# Patient Record
Sex: MALE | Race: WHITE | NOT HISPANIC OR LATINO | Employment: OTHER | URBAN - METROPOLITAN AREA
[De-identification: names, ages, dates, MRNs, and addresses within clinical notes are randomized per-mention and may not be internally consistent; named-entity substitution may affect disease eponyms.]

---

## 2017-06-28 ENCOUNTER — APPOINTMENT (OUTPATIENT)
Dept: CT IMAGING | Facility: HOSPITAL | Age: 67
End: 2017-06-28

## 2017-06-28 ENCOUNTER — HOSPITAL ENCOUNTER (OUTPATIENT)
Facility: HOSPITAL | Age: 67
Setting detail: OBSERVATION
Discharge: HOME | End: 2017-06-30
Attending: EMERGENCY MEDICINE | Admitting: INTERNAL MEDICINE

## 2017-06-28 ENCOUNTER — APPOINTMENT (OUTPATIENT)
Dept: GENERAL RADIOLOGY | Facility: HOSPITAL | Age: 67
End: 2017-06-28

## 2017-06-28 ENCOUNTER — APPOINTMENT (OUTPATIENT)
Dept: CARDIOLOGY | Facility: HOSPITAL | Age: 67
End: 2017-06-28
Attending: INTERNAL MEDICINE

## 2017-06-28 ENCOUNTER — APPOINTMENT (OUTPATIENT)
Dept: ULTRASOUND IMAGING | Facility: HOSPITAL | Age: 67
End: 2017-06-28
Attending: EMERGENCY MEDICINE

## 2017-06-28 DIAGNOSIS — R10.13 EPIGASTRIC PAIN: ICD-10-CM

## 2017-06-28 DIAGNOSIS — R07.9 CHEST PAIN, UNSPECIFIED TYPE: Primary | ICD-10-CM

## 2017-06-28 DIAGNOSIS — I10 ESSENTIAL HYPERTENSION: ICD-10-CM

## 2017-06-28 DIAGNOSIS — K80.20 GALLSTONES: ICD-10-CM

## 2017-06-28 PROBLEM — R40.0 UNCONTROLLED DAYTIME SOMNOLENCE: Status: ACTIVE | Noted: 2017-06-28

## 2017-06-28 PROBLEM — R06.83 SNORING: Status: ACTIVE | Noted: 2017-06-28

## 2017-06-28 PROBLEM — R10.84 GENERALIZED ABDOMINAL PAIN: Status: ACTIVE | Noted: 2017-06-28

## 2017-06-28 PROBLEM — R07.2 PRECORDIAL PAIN: Status: ACTIVE | Noted: 2017-06-28

## 2017-06-28 PROBLEM — I20.9 ANGINA PECTORIS (HCC): Status: ACTIVE | Noted: 2017-06-28

## 2017-06-28 PROBLEM — Z72.0 TOBACCO ABUSE: Status: ACTIVE | Noted: 2017-06-28

## 2017-06-28 PROBLEM — N40.0 BENIGN PROSTATIC HYPERPLASIA WITHOUT LOWER URINARY TRACT SYMPTOMS: Status: ACTIVE | Noted: 2017-06-28

## 2017-06-28 PROBLEM — R53.83 FATIGUE: Status: ACTIVE | Noted: 2017-06-28

## 2017-06-28 PROBLEM — R73.9 HYPERGLYCEMIA: Status: ACTIVE | Noted: 2017-06-28

## 2017-06-28 LAB
ALBUMIN SERPL-MCNC: 4 G/DL (ref 3.5–5.2)
ALBUMIN/GLOB SERPL: 1.3 G/DL
ALP SERPL-CCNC: 83 U/L (ref 39–117)
ALT SERPL W P-5'-P-CCNC: 21 U/L (ref 1–41)
ANION GAP SERPL CALCULATED.3IONS-SCNC: 17 MMOL/L
AST SERPL-CCNC: 18 U/L (ref 1–40)
BASOPHILS # BLD AUTO: 0.04 10*3/MM3 (ref 0–0.2)
BASOPHILS NFR BLD AUTO: 0.6 % (ref 0–1.5)
BH CV ECHO MEAS - ACS: 1.9 CM
BH CV ECHO MEAS - AO MAX PG (FULL): 1 MMHG
BH CV ECHO MEAS - AO MAX PG: 5.8 MMHG
BH CV ECHO MEAS - AO MEAN PG (FULL): 1 MMHG
BH CV ECHO MEAS - AO MEAN PG: 3 MMHG
BH CV ECHO MEAS - AO ROOT AREA (BSA CORRECTED): 1.8
BH CV ECHO MEAS - AO ROOT AREA: 10.8 CM^2
BH CV ECHO MEAS - AO ROOT DIAM: 3.7 CM
BH CV ECHO MEAS - AO V2 MAX: 120 CM/SEC
BH CV ECHO MEAS - AO V2 MEAN: 74.1 CM/SEC
BH CV ECHO MEAS - AO V2 VTI: 22.5 CM
BH CV ECHO MEAS - ASC AORTA: 3.8 CM
BH CV ECHO MEAS - AVA(I,A): 3.1 CM^2
BH CV ECHO MEAS - AVA(I,D): 3.1 CM^2
BH CV ECHO MEAS - AVA(V,A): 2.9 CM^2
BH CV ECHO MEAS - AVA(V,D): 2.9 CM^2
BH CV ECHO MEAS - BSA(HAYCOCK): 2.1 M^2
BH CV ECHO MEAS - BSA: 2.1 M^2
BH CV ECHO MEAS - BZI_BMI: 26.5 KILOGRAMS/M^2
BH CV ECHO MEAS - BZI_METRIC_HEIGHT: 180.3 CM
BH CV ECHO MEAS - BZI_METRIC_WEIGHT: 86.2 KG
BH CV ECHO MEAS - CONTRAST EF (2CH): 51.8 ML/M^2
BH CV ECHO MEAS - CONTRAST EF 4CH: 50.9 ML/M^2
BH CV ECHO MEAS - EDV(CUBED): 110.6 ML
BH CV ECHO MEAS - EDV(MOD-SP2): 83 ML
BH CV ECHO MEAS - EDV(MOD-SP4): 108 ML
BH CV ECHO MEAS - EDV(TEICH): 107.5 ML
BH CV ECHO MEAS - EF(CUBED): 75.6 %
BH CV ECHO MEAS - EF(MOD-SP2): 51.8 %
BH CV ECHO MEAS - EF(MOD-SP4): 50.9 %
BH CV ECHO MEAS - EF(TEICH): 67.4 %
BH CV ECHO MEAS - ESV(CUBED): 27 ML
BH CV ECHO MEAS - ESV(MOD-SP2): 40 ML
BH CV ECHO MEAS - ESV(MOD-SP4): 53 ML
BH CV ECHO MEAS - ESV(TEICH): 35 ML
BH CV ECHO MEAS - FS: 37.5 %
BH CV ECHO MEAS - IVS/LVPW: 1
BH CV ECHO MEAS - IVSD: 0.8 CM
BH CV ECHO MEAS - LAT PEAK E' VEL: 6 CM/SEC
BH CV ECHO MEAS - LV DIASTOLIC VOL/BSA (35-75): 52.3 ML/M^2
BH CV ECHO MEAS - LV MASS(C)D: 126.7 GRAMS
BH CV ECHO MEAS - LV MASS(C)DI: 61.4 GRAMS/M^2
BH CV ECHO MEAS - LV MAX PG: 4.8 MMHG
BH CV ECHO MEAS - LV MEAN PG: 2 MMHG
BH CV ECHO MEAS - LV SYSTOLIC VOL/BSA (12-30): 25.7 ML/M^2
BH CV ECHO MEAS - LV V1 MAX: 109 CM/SEC
BH CV ECHO MEAS - LV V1 MEAN: 65 CM/SEC
BH CV ECHO MEAS - LV V1 VTI: 22.1 CM
BH CV ECHO MEAS - LVIDD: 4.8 CM
BH CV ECHO MEAS - LVIDS: 3 CM
BH CV ECHO MEAS - LVLD AP2: 7.8 CM
BH CV ECHO MEAS - LVLD AP4: 8.2 CM
BH CV ECHO MEAS - LVLS AP2: 6.8 CM
BH CV ECHO MEAS - LVLS AP4: 7.2 CM
BH CV ECHO MEAS - LVOT AREA (M): 3.1 CM^2
BH CV ECHO MEAS - LVOT AREA: 3.1 CM^2
BH CV ECHO MEAS - LVOT DIAM: 2 CM
BH CV ECHO MEAS - LVPWD: 0.8 CM
BH CV ECHO MEAS - MED PEAK E' VEL: 6 CM/SEC
BH CV ECHO MEAS - MV A DUR: 0.22 SEC
BH CV ECHO MEAS - MV A MAX VEL: 71.6 CM/SEC
BH CV ECHO MEAS - MV DEC SLOPE: 332 CM/SEC^2
BH CV ECHO MEAS - MV DEC TIME: 0.22 SEC
BH CV ECHO MEAS - MV E MAX VEL: 58.5 CM/SEC
BH CV ECHO MEAS - MV E/A: 0.82
BH CV ECHO MEAS - MV MAX PG: 3.1 MMHG
BH CV ECHO MEAS - MV MEAN PG: 1 MMHG
BH CV ECHO MEAS - MV P1/2T MAX VEL: 73.4 CM/SEC
BH CV ECHO MEAS - MV P1/2T: 64.8 MSEC
BH CV ECHO MEAS - MV V2 MAX: 87.9 CM/SEC
BH CV ECHO MEAS - MV V2 MEAN: 43.8 CM/SEC
BH CV ECHO MEAS - MV V2 VTI: 21.7 CM
BH CV ECHO MEAS - MVA P1/2T LCG: 3 CM^2
BH CV ECHO MEAS - MVA(P1/2T): 3.4 CM^2
BH CV ECHO MEAS - MVA(VTI): 3.2 CM^2
BH CV ECHO MEAS - PA ACC TIME: 0.14 SEC
BH CV ECHO MEAS - PA MAX PG (FULL): 2.1 MMHG
BH CV ECHO MEAS - PA MAX PG: 3.3 MMHG
BH CV ECHO MEAS - PA PR(ACCEL): 15.6 MMHG
BH CV ECHO MEAS - PA V2 MAX: 91.5 CM/SEC
BH CV ECHO MEAS - PULM A REVS DUR: 0.15 SEC
BH CV ECHO MEAS - PULM A REVS VEL: 31.2 CM/SEC
BH CV ECHO MEAS - PULM DIAS VEL: 47.1 CM/SEC
BH CV ECHO MEAS - PULM S/D: 1.3
BH CV ECHO MEAS - PULM SYS VEL: 61.2 CM/SEC
BH CV ECHO MEAS - PVA(V,A): 2.3 CM^2
BH CV ECHO MEAS - PVA(V,D): 2.3 CM^2
BH CV ECHO MEAS - QP/QS: 0.57
BH CV ECHO MEAS - RAP SYSTOLE: 3 MMHG
BH CV ECHO MEAS - RV MAX PG: 1.3 MMHG
BH CV ECHO MEAS - RV MEAN PG: 1 MMHG
BH CV ECHO MEAS - RV V1 MAX: 56.2 CM/SEC
BH CV ECHO MEAS - RV V1 MEAN: 34.1 CM/SEC
BH CV ECHO MEAS - RV V1 VTI: 10.4 CM
BH CV ECHO MEAS - RVOT AREA: 3.8 CM^2
BH CV ECHO MEAS - RVOT DIAM: 2.2 CM
BH CV ECHO MEAS - RVSP: 21 MMHG
BH CV ECHO MEAS - SI(AO): 117.2 ML/M^2
BH CV ECHO MEAS - SI(CUBED): 40.5 ML/M^2
BH CV ECHO MEAS - SI(LVOT): 33.6 ML/M^2
BH CV ECHO MEAS - SI(MOD-SP2): 20.8 ML/M^2
BH CV ECHO MEAS - SI(MOD-SP4): 26.7 ML/M^2
BH CV ECHO MEAS - SI(TEICH): 35.1 ML/M^2
BH CV ECHO MEAS - SV(AO): 241.9 ML
BH CV ECHO MEAS - SV(CUBED): 83.6 ML
BH CV ECHO MEAS - SV(LVOT): 69.4 ML
BH CV ECHO MEAS - SV(MOD-SP2): 43 ML
BH CV ECHO MEAS - SV(MOD-SP4): 55 ML
BH CV ECHO MEAS - SV(RVOT): 39.5 ML
BH CV ECHO MEAS - SV(TEICH): 72.5 ML
BH CV ECHO MEAS - TAPSE (>1.6): 2.6 CM2
BH CV ECHO MEAS - TR MAX VEL: 212 CM/SEC
BH CV VAS BP RIGHT ARM: NORMAL MMHG
BH CV XLRA - RV BASE: 4.1 CM
BH CV XLRA - RV LENGTH: 7.5 CM
BH CV XLRA - RV MID: 4.1 CM
BH CV XLRA - TDI S': 19 CM/SEC
BILIRUB SERPL-MCNC: 0.4 MG/DL (ref 0.1–1.2)
BILIRUB UR QL STRIP: NEGATIVE
BUN BLD-MCNC: 18 MG/DL (ref 8–23)
BUN/CREAT SERPL: 17.8 (ref 7–25)
CALCIUM SPEC-SCNC: 10.1 MG/DL (ref 8.6–10.5)
CHLORIDE SERPL-SCNC: 99 MMOL/L (ref 98–107)
CLARITY UR: ABNORMAL
CO2 SERPL-SCNC: 21 MMOL/L (ref 22–29)
COLOR UR: YELLOW
CREAT BLD-MCNC: 1.01 MG/DL (ref 0.76–1.27)
D DIMER PPP FEU-MCNC: 0.34 MCGFEU/ML (ref 0–0.49)
DEPRECATED RDW RBC AUTO: 41.2 FL (ref 37–54)
E/E' RATIO: 10
EOSINOPHIL # BLD AUTO: 0.25 10*3/MM3 (ref 0–0.7)
EOSINOPHIL NFR BLD AUTO: 4.1 % (ref 0.3–6.2)
ERYTHROCYTE [DISTWIDTH] IN BLOOD BY AUTOMATED COUNT: 13.1 % (ref 11.5–14.5)
GFR SERPL CREATININE-BSD FRML MDRD: 73 ML/MIN/1.73
GLOBULIN UR ELPH-MCNC: 3 GM/DL
GLUCOSE BLD-MCNC: 117 MG/DL (ref 65–99)
GLUCOSE UR STRIP-MCNC: NEGATIVE MG/DL
HBA1C MFR BLD: 5.6 % (ref 4.8–5.6)
HCT VFR BLD AUTO: 40.1 % (ref 40.4–52.2)
HGB BLD-MCNC: 13.5 G/DL (ref 13.7–17.6)
HGB UR QL STRIP.AUTO: NEGATIVE
HOLD SPECIMEN: NORMAL
HOLD SPECIMEN: NORMAL
IMM GRANULOCYTES # BLD: 0 10*3/MM3 (ref 0–0.03)
IMM GRANULOCYTES NFR BLD: 0 % (ref 0–0.5)
INR PPP: 1.01 (ref 0.9–1.1)
KETONES UR QL STRIP: NEGATIVE
LEFT ATRIUM VOLUME INDEX: 18 ML/M2
LEFT ATRIUM VOLUME: 37 CM3
LEUKOCYTE ESTERASE UR QL STRIP.AUTO: NEGATIVE
LIPASE SERPL-CCNC: 37 U/L (ref 13–60)
LV EF 2D ECHO EST: 51 %
LYMPHOCYTES # BLD AUTO: 1.51 10*3/MM3 (ref 0.9–4.8)
LYMPHOCYTES NFR BLD AUTO: 24.5 % (ref 19.6–45.3)
MCH RBC QN AUTO: 28.7 PG (ref 27–32.7)
MCHC RBC AUTO-ENTMCNC: 33.7 G/DL (ref 32.6–36.4)
MCV RBC AUTO: 85.3 FL (ref 79.8–96.2)
MONOCYTES # BLD AUTO: 0.53 10*3/MM3 (ref 0.2–1.2)
MONOCYTES NFR BLD AUTO: 8.6 % (ref 5–12)
NEUTROPHILS # BLD AUTO: 3.83 10*3/MM3 (ref 1.9–8.1)
NEUTROPHILS NFR BLD AUTO: 62.2 % (ref 42.7–76)
NITRITE UR QL STRIP: NEGATIVE
NT-PROBNP SERPL-MCNC: 20.4 PG/ML (ref 0–900)
PH UR STRIP.AUTO: 8 [PH] (ref 5–8)
PLATELET # BLD AUTO: 201 10*3/MM3 (ref 140–500)
PMV BLD AUTO: 10.5 FL (ref 6–12)
POTASSIUM BLD-SCNC: 4.5 MMOL/L (ref 3.5–5.2)
PROT SERPL-MCNC: 7 G/DL (ref 6–8.5)
PROT UR QL STRIP: NEGATIVE
PROTHROMBIN TIME: 12.9 SECONDS (ref 11.7–14.2)
RBC # BLD AUTO: 4.7 10*6/MM3 (ref 4.6–6)
SODIUM BLD-SCNC: 137 MMOL/L (ref 136–145)
SP GR UR STRIP: 1.02 (ref 1–1.03)
TROPONIN T SERPL-MCNC: <0.01 NG/ML (ref 0–0.03)
UROBILINOGEN UR QL STRIP: ABNORMAL
WBC NRBC COR # BLD: 6.16 10*3/MM3 (ref 4.5–10.7)
WHOLE BLOOD HOLD SPECIMEN: NORMAL
WHOLE BLOOD HOLD SPECIMEN: NORMAL

## 2017-06-28 PROCEDURE — G0378 HOSPITAL OBSERVATION PER HR: HCPCS

## 2017-06-28 PROCEDURE — 99204 OFFICE O/P NEW MOD 45 MIN: CPT | Performed by: INTERNAL MEDICINE

## 2017-06-28 PROCEDURE — 81003 URINALYSIS AUTO W/O SCOPE: CPT | Performed by: EMERGENCY MEDICINE

## 2017-06-28 PROCEDURE — 96375 TX/PRO/DX INJ NEW DRUG ADDON: CPT

## 2017-06-28 PROCEDURE — 25010000002 ENOXAPARIN PER 10 MG: Performed by: INTERNAL MEDICINE

## 2017-06-28 PROCEDURE — 0 IOPAMIDOL 61 % SOLUTION: Performed by: EMERGENCY MEDICINE

## 2017-06-28 PROCEDURE — 71020 HC CHEST PA AND LATERAL: CPT

## 2017-06-28 PROCEDURE — 25010000002 ONDANSETRON PER 1 MG: Performed by: INTERNAL MEDICINE

## 2017-06-28 PROCEDURE — 0 IOPAMIDOL PER 1 ML: Performed by: INTERNAL MEDICINE

## 2017-06-28 PROCEDURE — 93005 ELECTROCARDIOGRAM TRACING: CPT | Performed by: EMERGENCY MEDICINE

## 2017-06-28 PROCEDURE — 25810000003 SODIUM CHLORIDE 0.9 % WITH KCL 20 MEQ 20-0.9 MEQ/L-% SOLUTION: Performed by: INTERNAL MEDICINE

## 2017-06-28 PROCEDURE — 93306 TTE W/DOPPLER COMPLETE: CPT | Performed by: INTERNAL MEDICINE

## 2017-06-28 PROCEDURE — 85379 FIBRIN DEGRADATION QUANT: CPT | Performed by: INTERNAL MEDICINE

## 2017-06-28 PROCEDURE — 96374 THER/PROPH/DIAG INJ IV PUSH: CPT

## 2017-06-28 PROCEDURE — 25010000002 MORPHINE SULFATE (PF) 2 MG/ML SOLUTION

## 2017-06-28 PROCEDURE — 71275 CT ANGIOGRAPHY CHEST: CPT

## 2017-06-28 PROCEDURE — 85025 COMPLETE CBC W/AUTO DIFF WBC: CPT | Performed by: EMERGENCY MEDICINE

## 2017-06-28 PROCEDURE — 96372 THER/PROPH/DIAG INJ SC/IM: CPT

## 2017-06-28 PROCEDURE — 99284 EMERGENCY DEPT VISIT MOD MDM: CPT

## 2017-06-28 PROCEDURE — 0399T ADULT TRANSTHORACIC ECHO COMPLETE: CPT | Performed by: INTERNAL MEDICINE

## 2017-06-28 PROCEDURE — 25010000002 MORPHINE PER 10 MG: Performed by: EMERGENCY MEDICINE

## 2017-06-28 PROCEDURE — 93306 TTE W/DOPPLER COMPLETE: CPT

## 2017-06-28 PROCEDURE — 93010 ELECTROCARDIOGRAM REPORT: CPT | Performed by: INTERNAL MEDICINE

## 2017-06-28 PROCEDURE — 74177 CT ABD & PELVIS W/CONTRAST: CPT

## 2017-06-28 PROCEDURE — 0399T HC MYOCARDL STRAIN IMAG QUAN ASSMT PER SESS: CPT

## 2017-06-28 PROCEDURE — 84484 ASSAY OF TROPONIN QUANT: CPT | Performed by: INTERNAL MEDICINE

## 2017-06-28 PROCEDURE — 93005 ELECTROCARDIOGRAM TRACING: CPT

## 2017-06-28 PROCEDURE — 80053 COMPREHEN METABOLIC PANEL: CPT | Performed by: EMERGENCY MEDICINE

## 2017-06-28 PROCEDURE — 83880 ASSAY OF NATRIURETIC PEPTIDE: CPT | Performed by: EMERGENCY MEDICINE

## 2017-06-28 PROCEDURE — 85610 PROTHROMBIN TIME: CPT | Performed by: EMERGENCY MEDICINE

## 2017-06-28 PROCEDURE — 84484 ASSAY OF TROPONIN QUANT: CPT | Performed by: EMERGENCY MEDICINE

## 2017-06-28 PROCEDURE — 96376 TX/PRO/DX INJ SAME DRUG ADON: CPT

## 2017-06-28 PROCEDURE — 76705 ECHO EXAM OF ABDOMEN: CPT

## 2017-06-28 PROCEDURE — 83036 HEMOGLOBIN GLYCOSYLATED A1C: CPT | Performed by: INTERNAL MEDICINE

## 2017-06-28 PROCEDURE — 96361 HYDRATE IV INFUSION ADD-ON: CPT

## 2017-06-28 PROCEDURE — 83690 ASSAY OF LIPASE: CPT | Performed by: EMERGENCY MEDICINE

## 2017-06-28 RX ORDER — NAPROXEN SODIUM 220 MG
220 TABLET ORAL EVERY 4 HOURS PRN
COMMUNITY
End: 2017-06-30 | Stop reason: HOSPADM

## 2017-06-28 RX ORDER — MORPHINE SULFATE 2 MG/ML
2 INJECTION, SOLUTION INTRAMUSCULAR; INTRAVENOUS EVERY 4 HOURS PRN
Status: DISCONTINUED | OUTPATIENT
Start: 2017-06-28 | End: 2017-06-30 | Stop reason: HOSPADM

## 2017-06-28 RX ORDER — ACETAMINOPHEN 325 MG/1
650 TABLET ORAL EVERY 4 HOURS PRN
Status: DISCONTINUED | OUTPATIENT
Start: 2017-06-28 | End: 2017-06-30 | Stop reason: HOSPADM

## 2017-06-28 RX ORDER — MORPHINE SULFATE 2 MG/ML
2 INJECTION, SOLUTION INTRAMUSCULAR; INTRAVENOUS ONCE
Status: COMPLETED | OUTPATIENT
Start: 2017-06-28 | End: 2017-06-28

## 2017-06-28 RX ORDER — SODIUM CHLORIDE AND POTASSIUM CHLORIDE 150; 900 MG/100ML; MG/100ML
100 INJECTION, SOLUTION INTRAVENOUS CONTINUOUS
Status: DISCONTINUED | OUTPATIENT
Start: 2017-06-28 | End: 2017-06-30 | Stop reason: HOSPADM

## 2017-06-28 RX ORDER — MORPHINE SULFATE 2 MG/ML
INJECTION, SOLUTION INTRAMUSCULAR; INTRAVENOUS
Status: COMPLETED
Start: 2017-06-28 | End: 2017-06-28

## 2017-06-28 RX ORDER — SODIUM CHLORIDE 0.9 % (FLUSH) 0.9 %
1-10 SYRINGE (ML) INJECTION AS NEEDED
Status: DISCONTINUED | OUTPATIENT
Start: 2017-06-28 | End: 2017-06-30 | Stop reason: HOSPADM

## 2017-06-28 RX ORDER — SODIUM CHLORIDE 0.9 % (FLUSH) 0.9 %
10 SYRINGE (ML) INJECTION AS NEEDED
Status: DISCONTINUED | OUTPATIENT
Start: 2017-06-28 | End: 2017-06-30 | Stop reason: HOSPADM

## 2017-06-28 RX ORDER — ONDANSETRON 2 MG/ML
4 INJECTION INTRAMUSCULAR; INTRAVENOUS EVERY 4 HOURS PRN
Status: DISCONTINUED | OUTPATIENT
Start: 2017-06-28 | End: 2017-06-30 | Stop reason: HOSPADM

## 2017-06-28 RX ORDER — ASPIRIN 81 MG/1
324 TABLET, CHEWABLE ORAL DAILY
Status: DISCONTINUED | OUTPATIENT
Start: 2017-06-29 | End: 2017-06-30 | Stop reason: HOSPADM

## 2017-06-28 RX ORDER — HYDROCODONE BITARTRATE AND ACETAMINOPHEN 5; 325 MG/1; MG/1
1 TABLET ORAL EVERY 4 HOURS PRN
Status: DISCONTINUED | OUTPATIENT
Start: 2017-06-28 | End: 2017-06-30 | Stop reason: HOSPADM

## 2017-06-28 RX ORDER — FAMOTIDINE 40 MG/1
40 TABLET, FILM COATED ORAL DAILY
Status: DISCONTINUED | OUTPATIENT
Start: 2017-06-28 | End: 2017-06-30 | Stop reason: HOSPADM

## 2017-06-28 RX ADMIN — MORPHINE SULFATE 2 MG: 2 INJECTION, SOLUTION INTRAMUSCULAR; INTRAVENOUS at 11:12

## 2017-06-28 RX ADMIN — MORPHINE SULFATE 2 MG: 2 INJECTION, SOLUTION INTRAMUSCULAR; INTRAVENOUS at 15:38

## 2017-06-28 RX ADMIN — POTASSIUM CHLORIDE AND SODIUM CHLORIDE 100 ML/HR: 900; 150 INJECTION, SOLUTION INTRAVENOUS at 18:18

## 2017-06-28 RX ADMIN — IOPAMIDOL 85 ML: 612 INJECTION, SOLUTION INTRAVENOUS at 10:38

## 2017-06-28 RX ADMIN — FAMOTIDINE 40 MG: 40 TABLET, FILM COATED ORAL at 18:22

## 2017-06-28 RX ADMIN — ONDANSETRON 4 MG: 2 INJECTION INTRAMUSCULAR; INTRAVENOUS at 15:30

## 2017-06-28 RX ADMIN — NITROGLYCERIN 1 INCH: 20 OINTMENT TOPICAL at 12:12

## 2017-06-28 RX ADMIN — ENOXAPARIN SODIUM 40 MG: 40 INJECTION SUBCUTANEOUS at 18:18

## 2017-06-28 RX ADMIN — ACETAMINOPHEN 650 MG: 325 TABLET ORAL at 18:22

## 2017-06-28 RX ADMIN — IOPAMIDOL 95 ML: 755 INJECTION, SOLUTION INTRAVENOUS at 17:28

## 2017-06-29 ENCOUNTER — APPOINTMENT (OUTPATIENT)
Dept: NUCLEAR MEDICINE | Facility: HOSPITAL | Age: 67
End: 2017-06-29

## 2017-06-29 LAB
ANION GAP SERPL CALCULATED.3IONS-SCNC: 14.9 MMOL/L
BUN BLD-MCNC: 14 MG/DL (ref 8–23)
BUN/CREAT SERPL: 15.4 (ref 7–25)
CALCIUM SPEC-SCNC: 8.7 MG/DL (ref 8.6–10.5)
CHLORIDE SERPL-SCNC: 103 MMOL/L (ref 98–107)
CO2 SERPL-SCNC: 21.1 MMOL/L (ref 22–29)
CREAT BLD-MCNC: 0.91 MG/DL (ref 0.76–1.27)
DEPRECATED RDW RBC AUTO: 43.3 FL (ref 37–54)
ERYTHROCYTE [DISTWIDTH] IN BLOOD BY AUTOMATED COUNT: 13.6 % (ref 11.5–14.5)
GFR SERPL CREATININE-BSD FRML MDRD: 83 ML/MIN/1.73
GLUCOSE BLD-MCNC: 101 MG/DL (ref 65–99)
HCT VFR BLD AUTO: 36.9 % (ref 40.4–52.2)
HGB BLD-MCNC: 12.2 G/DL (ref 13.7–17.6)
MCH RBC QN AUTO: 29 PG (ref 27–32.7)
MCHC RBC AUTO-ENTMCNC: 33.1 G/DL (ref 32.6–36.4)
MCV RBC AUTO: 87.9 FL (ref 79.8–96.2)
PLATELET # BLD AUTO: 182 10*3/MM3 (ref 140–500)
PMV BLD AUTO: 10.6 FL (ref 6–12)
POTASSIUM BLD-SCNC: 4.4 MMOL/L (ref 3.5–5.2)
RBC # BLD AUTO: 4.2 10*6/MM3 (ref 4.6–6)
SODIUM BLD-SCNC: 139 MMOL/L (ref 136–145)
TROPONIN T SERPL-MCNC: <0.01 NG/ML (ref 0–0.03)
WBC NRBC COR # BLD: 7.39 10*3/MM3 (ref 4.5–10.7)

## 2017-06-29 PROCEDURE — 96376 TX/PRO/DX INJ SAME DRUG ADON: CPT

## 2017-06-29 PROCEDURE — 80048 BASIC METABOLIC PNL TOTAL CA: CPT | Performed by: INTERNAL MEDICINE

## 2017-06-29 PROCEDURE — 99218 PR INITIAL OBSERVATION CARE/DAY 30 MINUTES: CPT | Performed by: SURGERY

## 2017-06-29 PROCEDURE — G0378 HOSPITAL OBSERVATION PER HR: HCPCS

## 2017-06-29 PROCEDURE — 93010 ELECTROCARDIOGRAM REPORT: CPT | Performed by: INTERNAL MEDICINE

## 2017-06-29 PROCEDURE — 25010000002 MORPHINE PER 10 MG: Performed by: RADIOLOGY

## 2017-06-29 PROCEDURE — 85027 COMPLETE CBC AUTOMATED: CPT | Performed by: INTERNAL MEDICINE

## 2017-06-29 PROCEDURE — 84484 ASSAY OF TROPONIN QUANT: CPT | Performed by: INTERNAL MEDICINE

## 2017-06-29 PROCEDURE — 0 TECHNETIUM TC 99M MEBROFENIN KIT: Performed by: HOSPITALIST

## 2017-06-29 PROCEDURE — 99213 OFFICE O/P EST LOW 20 MIN: CPT | Performed by: INTERNAL MEDICINE

## 2017-06-29 PROCEDURE — 93005 ELECTROCARDIOGRAM TRACING: CPT | Performed by: INTERNAL MEDICINE

## 2017-06-29 PROCEDURE — 25010000002 ENOXAPARIN PER 10 MG: Performed by: INTERNAL MEDICINE

## 2017-06-29 PROCEDURE — 25810000003 SODIUM CHLORIDE 0.9 % WITH KCL 20 MEQ 20-0.9 MEQ/L-% SOLUTION: Performed by: INTERNAL MEDICINE

## 2017-06-29 PROCEDURE — A9537 TC99M MEBROFENIN: HCPCS | Performed by: HOSPITALIST

## 2017-06-29 PROCEDURE — 78227 HEPATOBIL SYST IMAGE W/DRUG: CPT

## 2017-06-29 PROCEDURE — 96372 THER/PROPH/DIAG INJ SC/IM: CPT

## 2017-06-29 PROCEDURE — 96361 HYDRATE IV INFUSION ADD-ON: CPT

## 2017-06-29 RX ORDER — KIT FOR THE PREPARATION OF TECHNETIUM TC 99M MEBROFENIN 45 MG/10ML
1 INJECTION, POWDER, LYOPHILIZED, FOR SOLUTION INTRAVENOUS
Status: COMPLETED | OUTPATIENT
Start: 2017-06-29 | End: 2017-06-29

## 2017-06-29 RX ORDER — MORPHINE SULFATE 10 MG/ML
0.04 INJECTION, SOLUTION INTRAMUSCULAR; INTRAVENOUS
Status: COMPLETED | OUTPATIENT
Start: 2017-06-29 | End: 2017-06-29

## 2017-06-29 RX ADMIN — FAMOTIDINE 40 MG: 40 TABLET, FILM COATED ORAL at 14:57

## 2017-06-29 RX ADMIN — POTASSIUM CHLORIDE AND SODIUM CHLORIDE 100 ML/HR: 900; 150 INJECTION, SOLUTION INTRAVENOUS at 04:13

## 2017-06-29 RX ADMIN — MORPHINE SULFATE 3.4 MG: 10 INJECTION, SOLUTION INTRAMUSCULAR; INTRAVENOUS at 13:05

## 2017-06-29 RX ADMIN — ASPIRIN 324 MG: 81 TABLET, CHEWABLE ORAL at 14:59

## 2017-06-29 RX ADMIN — POTASSIUM CHLORIDE AND SODIUM CHLORIDE 100 ML/HR: 900; 150 INJECTION, SOLUTION INTRAVENOUS at 15:02

## 2017-06-29 RX ADMIN — ENOXAPARIN SODIUM 40 MG: 40 INJECTION SUBCUTANEOUS at 17:08

## 2017-06-29 RX ADMIN — MEBROFENIN 1 DOSE: 45 INJECTION, POWDER, LYOPHILIZED, FOR SOLUTION INTRAVENOUS at 12:15

## 2017-06-30 VITALS
SYSTOLIC BLOOD PRESSURE: 124 MMHG | WEIGHT: 190 LBS | RESPIRATION RATE: 16 BRPM | TEMPERATURE: 97.6 F | HEIGHT: 71 IN | OXYGEN SATURATION: 95 % | HEART RATE: 84 BPM | DIASTOLIC BLOOD PRESSURE: 92 MMHG | BODY MASS INDEX: 26.6 KG/M2

## 2017-06-30 PROCEDURE — 96361 HYDRATE IV INFUSION ADD-ON: CPT

## 2017-06-30 PROCEDURE — G0378 HOSPITAL OBSERVATION PER HR: HCPCS

## 2017-06-30 PROCEDURE — 25810000003 SODIUM CHLORIDE 0.9 % WITH KCL 20 MEQ 20-0.9 MEQ/L-% SOLUTION: Performed by: INTERNAL MEDICINE

## 2017-06-30 RX ORDER — ACETAMINOPHEN 325 MG/1
650 TABLET ORAL EVERY 4 HOURS PRN
Refills: 0
Start: 2017-06-30 | End: 2017-08-04 | Stop reason: HOSPADM

## 2017-06-30 RX ORDER — POLYETHYLENE GLYCOL 3350 17 G/17G
17 POWDER, FOR SOLUTION ORAL 2 TIMES DAILY
Status: DISCONTINUED | OUTPATIENT
Start: 2017-06-30 | End: 2017-06-30 | Stop reason: HOSPADM

## 2017-06-30 RX ORDER — POLYETHYLENE GLYCOL 3350 17 G/17G
17 POWDER, FOR SOLUTION ORAL 2 TIMES DAILY
Start: 2017-06-30 | End: 2017-07-31

## 2017-06-30 RX ADMIN — ASPIRIN 324 MG: 81 TABLET, CHEWABLE ORAL at 08:34

## 2017-06-30 RX ADMIN — POTASSIUM CHLORIDE AND SODIUM CHLORIDE 100 ML/HR: 900; 150 INJECTION, SOLUTION INTRAVENOUS at 00:52

## 2017-06-30 RX ADMIN — FAMOTIDINE 40 MG: 40 TABLET, FILM COATED ORAL at 08:34

## 2017-07-24 ENCOUNTER — TELEPHONE (OUTPATIENT)
Dept: SURGERY | Facility: CLINIC | Age: 67
End: 2017-07-24

## 2017-07-24 NOTE — TELEPHONE ENCOUNTER
Patient was seen by DR Patel in ER he called with severe abd pain. I informed him that DR Patel was out this Tues and Weds but could see him Friday . I also told him that if the pain became worse that he should go to the ER. He acknowledged

## 2017-07-28 ENCOUNTER — OFFICE VISIT (OUTPATIENT)
Dept: SURGERY | Facility: CLINIC | Age: 67
End: 2017-07-28

## 2017-07-28 VITALS — BODY MASS INDEX: 25.96 KG/M2 | WEIGHT: 185.4 LBS | OXYGEN SATURATION: 98 % | HEIGHT: 71 IN | HEART RATE: 75 BPM

## 2017-07-28 DIAGNOSIS — K80.20 CALCULUS OF GALLBLADDER WITHOUT CHOLECYSTITIS WITHOUT OBSTRUCTION: Primary | ICD-10-CM

## 2017-07-28 PROCEDURE — 99213 OFFICE O/P EST LOW 20 MIN: CPT | Performed by: SURGERY

## 2017-07-28 RX ORDER — CEFAZOLIN SODIUM 2 G/100ML
2 INJECTION, SOLUTION INTRAVENOUS ONCE
Status: CANCELLED | OUTPATIENT
Start: 2017-08-01 | End: 2017-07-28

## 2017-07-28 RX ORDER — NAPROXEN SODIUM 220 MG
220 TABLET ORAL 2 TIMES DAILY PRN
COMMUNITY
End: 2018-11-13

## 2017-08-01 ENCOUNTER — ANESTHESIA EVENT (OUTPATIENT)
Dept: PERIOP | Facility: HOSPITAL | Age: 67
End: 2017-08-01

## 2017-08-01 ENCOUNTER — HOSPITAL ENCOUNTER (OUTPATIENT)
Facility: HOSPITAL | Age: 67
Setting detail: HOSPITAL OUTPATIENT SURGERY
Discharge: HOME | End: 2017-08-01
Attending: SURGERY | Admitting: SURGERY

## 2017-08-01 ENCOUNTER — ANESTHESIA (OUTPATIENT)
Dept: PERIOP | Facility: HOSPITAL | Age: 67
End: 2017-08-01

## 2017-08-01 ENCOUNTER — APPOINTMENT (OUTPATIENT)
Dept: GENERAL RADIOLOGY | Facility: HOSPITAL | Age: 67
End: 2017-08-01

## 2017-08-01 VITALS
RESPIRATION RATE: 18 BRPM | OXYGEN SATURATION: 95 % | TEMPERATURE: 97.4 F | HEART RATE: 78 BPM | SYSTOLIC BLOOD PRESSURE: 146 MMHG | HEIGHT: 71 IN | DIASTOLIC BLOOD PRESSURE: 97 MMHG

## 2017-08-01 DIAGNOSIS — K80.20 CALCULUS OF GALLBLADDER WITHOUT CHOLECYSTITIS WITHOUT OBSTRUCTION: ICD-10-CM

## 2017-08-01 PROCEDURE — 25010000002 KETOROLAC TROMETHAMINE PER 15 MG: Performed by: NURSE ANESTHETIST, CERTIFIED REGISTERED

## 2017-08-01 PROCEDURE — 25010000002 FENTANYL CITRATE (PF) 100 MCG/2ML SOLUTION: Performed by: NURSE ANESTHETIST, CERTIFIED REGISTERED

## 2017-08-01 PROCEDURE — 47563 LAPARO CHOLECYSTECTOMY/GRAPH: CPT | Performed by: SURGERY

## 2017-08-01 PROCEDURE — 25010000002 PROPOFOL 10 MG/ML EMULSION: Performed by: NURSE ANESTHETIST, CERTIFIED REGISTERED

## 2017-08-01 PROCEDURE — 0 IOTHALAMATE 60 % SOLUTION: Performed by: SURGERY

## 2017-08-01 PROCEDURE — 25010000002 MIDAZOLAM PER 1 MG: Performed by: ANESTHESIOLOGY

## 2017-08-01 PROCEDURE — 47563 LAPARO CHOLECYSTECTOMY/GRAPH: CPT | Performed by: PHYSICIAN ASSISTANT

## 2017-08-01 PROCEDURE — 74300 X-RAY BILE DUCTS/PANCREAS: CPT

## 2017-08-01 PROCEDURE — 88304 TISSUE EXAM BY PATHOLOGIST: CPT | Performed by: SURGERY

## 2017-08-01 PROCEDURE — 25010000002 PHENYLEPHRINE PER 1 ML: Performed by: NURSE ANESTHETIST, CERTIFIED REGISTERED

## 2017-08-01 PROCEDURE — 25010000002 ONDANSETRON PER 1 MG: Performed by: NURSE ANESTHETIST, CERTIFIED REGISTERED

## 2017-08-01 PROCEDURE — 25010000003 CEFAZOLIN IN DEXTROSE 2-4 GM/100ML-% SOLUTION: Performed by: SURGERY

## 2017-08-01 PROCEDURE — 25010000002 DEXAMETHASONE PER 1 MG: Performed by: NURSE ANESTHETIST, CERTIFIED REGISTERED

## 2017-08-01 RX ORDER — KETOROLAC TROMETHAMINE 30 MG/ML
INJECTION, SOLUTION INTRAMUSCULAR; INTRAVENOUS AS NEEDED
Status: DISCONTINUED | OUTPATIENT
Start: 2017-08-01 | End: 2017-08-01 | Stop reason: SURG

## 2017-08-01 RX ORDER — CEFAZOLIN SODIUM 2 G/100ML
2 INJECTION, SOLUTION INTRAVENOUS ONCE
Status: COMPLETED | OUTPATIENT
Start: 2017-08-01 | End: 2017-08-01

## 2017-08-01 RX ORDER — SODIUM CHLORIDE, SODIUM LACTATE, POTASSIUM CHLORIDE, CALCIUM CHLORIDE 600; 310; 30; 20 MG/100ML; MG/100ML; MG/100ML; MG/100ML
9 INJECTION, SOLUTION INTRAVENOUS CONTINUOUS
Status: DISCONTINUED | OUTPATIENT
Start: 2017-08-01 | End: 2017-08-01 | Stop reason: HOSPADM

## 2017-08-01 RX ORDER — LIDOCAINE HYDROCHLORIDE 10 MG/ML
0.5 INJECTION, SOLUTION EPIDURAL; INFILTRATION; INTRACAUDAL; PERINEURAL ONCE AS NEEDED
Status: DISCONTINUED | OUTPATIENT
Start: 2017-08-01 | End: 2017-08-01 | Stop reason: HOSPADM

## 2017-08-01 RX ORDER — SODIUM CHLORIDE 9 MG/ML
INJECTION, SOLUTION INTRAVENOUS AS NEEDED
Status: DISCONTINUED | OUTPATIENT
Start: 2017-08-01 | End: 2017-08-01 | Stop reason: HOSPADM

## 2017-08-01 RX ORDER — LABETALOL HYDROCHLORIDE 5 MG/ML
5 INJECTION, SOLUTION INTRAVENOUS
Status: DISCONTINUED | OUTPATIENT
Start: 2017-08-01 | End: 2017-08-01 | Stop reason: HOSPADM

## 2017-08-01 RX ORDER — FENTANYL CITRATE 50 UG/ML
50 INJECTION, SOLUTION INTRAMUSCULAR; INTRAVENOUS
Status: DISCONTINUED | OUTPATIENT
Start: 2017-08-01 | End: 2017-08-01 | Stop reason: HOSPADM

## 2017-08-01 RX ORDER — BUPIVACAINE HYDROCHLORIDE AND EPINEPHRINE 5; 5 MG/ML; UG/ML
INJECTION, SOLUTION PERINEURAL AS NEEDED
Status: DISCONTINUED | OUTPATIENT
Start: 2017-08-01 | End: 2017-08-01 | Stop reason: HOSPADM

## 2017-08-01 RX ORDER — HYDRALAZINE HYDROCHLORIDE 20 MG/ML
5 INJECTION INTRAMUSCULAR; INTRAVENOUS
Status: DISCONTINUED | OUTPATIENT
Start: 2017-08-01 | End: 2017-08-01 | Stop reason: HOSPADM

## 2017-08-01 RX ORDER — HYDROCODONE BITARTRATE AND ACETAMINOPHEN 5; 325 MG/1; MG/1
1 TABLET ORAL EVERY 6 HOURS PRN
Qty: 15 TABLET | Refills: 0 | Status: SHIPPED | OUTPATIENT
Start: 2017-08-01 | End: 2017-08-04 | Stop reason: HOSPADM

## 2017-08-01 RX ORDER — FENTANYL CITRATE 50 UG/ML
100 INJECTION, SOLUTION INTRAMUSCULAR; INTRAVENOUS
Status: DISCONTINUED | OUTPATIENT
Start: 2017-08-01 | End: 2017-08-01 | Stop reason: HOSPADM

## 2017-08-01 RX ORDER — HYDROMORPHONE HYDROCHLORIDE 1 MG/ML
0.5 INJECTION, SOLUTION INTRAMUSCULAR; INTRAVENOUS; SUBCUTANEOUS
Status: DISCONTINUED | OUTPATIENT
Start: 2017-08-01 | End: 2017-08-01 | Stop reason: HOSPADM

## 2017-08-01 RX ORDER — ROCURONIUM BROMIDE 10 MG/ML
INJECTION, SOLUTION INTRAVENOUS AS NEEDED
Status: DISCONTINUED | OUTPATIENT
Start: 2017-08-01 | End: 2017-08-01 | Stop reason: SURG

## 2017-08-01 RX ORDER — ONDANSETRON 2 MG/ML
INJECTION INTRAMUSCULAR; INTRAVENOUS AS NEEDED
Status: DISCONTINUED | OUTPATIENT
Start: 2017-08-01 | End: 2017-08-01 | Stop reason: SURG

## 2017-08-01 RX ORDER — OXYCODONE HYDROCHLORIDE AND ACETAMINOPHEN 5; 325 MG/1; MG/1
2 TABLET ORAL ONCE AS NEEDED
Status: DISCONTINUED | OUTPATIENT
Start: 2017-08-01 | End: 2017-08-01 | Stop reason: HOSPADM

## 2017-08-01 RX ORDER — EPHEDRINE SULFATE 50 MG/ML
5 INJECTION, SOLUTION INTRAVENOUS ONCE AS NEEDED
Status: DISCONTINUED | OUTPATIENT
Start: 2017-08-01 | End: 2017-08-01 | Stop reason: HOSPADM

## 2017-08-01 RX ORDER — MIDAZOLAM HYDROCHLORIDE 1 MG/ML
1 INJECTION INTRAMUSCULAR; INTRAVENOUS
Status: DISCONTINUED | OUTPATIENT
Start: 2017-08-01 | End: 2017-08-01 | Stop reason: HOSPADM

## 2017-08-01 RX ORDER — LIDOCAINE HYDROCHLORIDE 20 MG/ML
INJECTION, SOLUTION INFILTRATION; PERINEURAL AS NEEDED
Status: DISCONTINUED | OUTPATIENT
Start: 2017-08-01 | End: 2017-08-01 | Stop reason: SURG

## 2017-08-01 RX ORDER — DEXAMETHASONE SODIUM PHOSPHATE 10 MG/ML
INJECTION INTRAMUSCULAR; INTRAVENOUS AS NEEDED
Status: DISCONTINUED | OUTPATIENT
Start: 2017-08-01 | End: 2017-08-01 | Stop reason: SURG

## 2017-08-01 RX ORDER — FENTANYL CITRATE 50 UG/ML
INJECTION, SOLUTION INTRAMUSCULAR; INTRAVENOUS AS NEEDED
Status: DISCONTINUED | OUTPATIENT
Start: 2017-08-01 | End: 2017-08-01 | Stop reason: SURG

## 2017-08-01 RX ORDER — ONDANSETRON 2 MG/ML
4 INJECTION INTRAMUSCULAR; INTRAVENOUS ONCE AS NEEDED
Status: DISCONTINUED | OUTPATIENT
Start: 2017-08-01 | End: 2017-08-01 | Stop reason: HOSPADM

## 2017-08-01 RX ORDER — MIDAZOLAM HYDROCHLORIDE 1 MG/ML
2 INJECTION INTRAMUSCULAR; INTRAVENOUS
Status: DISCONTINUED | OUTPATIENT
Start: 2017-08-01 | End: 2017-08-01 | Stop reason: HOSPADM

## 2017-08-01 RX ORDER — DIPHENHYDRAMINE HYDROCHLORIDE 50 MG/ML
6.25 INJECTION INTRAMUSCULAR; INTRAVENOUS
Status: DISCONTINUED | OUTPATIENT
Start: 2017-08-01 | End: 2017-08-01 | Stop reason: HOSPADM

## 2017-08-01 RX ORDER — FAMOTIDINE 10 MG/ML
20 INJECTION, SOLUTION INTRAVENOUS ONCE
Status: COMPLETED | OUTPATIENT
Start: 2017-08-01 | End: 2017-08-01

## 2017-08-01 RX ORDER — HYDROCODONE BITARTRATE AND ACETAMINOPHEN 7.5; 325 MG/1; MG/1
1 TABLET ORAL ONCE AS NEEDED
Status: COMPLETED | OUTPATIENT
Start: 2017-08-01 | End: 2017-08-01

## 2017-08-01 RX ORDER — SODIUM CHLORIDE 0.9 % (FLUSH) 0.9 %
1-10 SYRINGE (ML) INJECTION AS NEEDED
Status: DISCONTINUED | OUTPATIENT
Start: 2017-08-01 | End: 2017-08-01 | Stop reason: HOSPADM

## 2017-08-01 RX ORDER — FLUMAZENIL 0.1 MG/ML
0.2 INJECTION INTRAVENOUS AS NEEDED
Status: DISCONTINUED | OUTPATIENT
Start: 2017-08-01 | End: 2017-08-01 | Stop reason: HOSPADM

## 2017-08-01 RX ORDER — MAGNESIUM HYDROXIDE 1200 MG/15ML
LIQUID ORAL AS NEEDED
Status: DISCONTINUED | OUTPATIENT
Start: 2017-08-01 | End: 2017-08-01 | Stop reason: HOSPADM

## 2017-08-01 RX ORDER — PROPOFOL 10 MG/ML
VIAL (ML) INTRAVENOUS AS NEEDED
Status: DISCONTINUED | OUTPATIENT
Start: 2017-08-01 | End: 2017-08-01 | Stop reason: SURG

## 2017-08-01 RX ADMIN — SUGAMMADEX 200 MG: 100 INJECTION, SOLUTION INTRAVENOUS at 13:55

## 2017-08-01 RX ADMIN — DEXAMETHASONE SODIUM PHOSPHATE 8 MG: 10 INJECTION INTRAMUSCULAR; INTRAVENOUS at 13:15

## 2017-08-01 RX ADMIN — ONDANSETRON 4 MG: 2 INJECTION INTRAMUSCULAR; INTRAVENOUS at 13:29

## 2017-08-01 RX ADMIN — FENTANYL CITRATE 50 MCG: 50 INJECTION INTRAMUSCULAR; INTRAVENOUS at 13:42

## 2017-08-01 RX ADMIN — PHENYLEPHRINE HYDROCHLORIDE 100 MCG: 10 INJECTION INTRAVENOUS at 13:25

## 2017-08-01 RX ADMIN — FENTANYL CITRATE 50 MCG: 50 INJECTION INTRAMUSCULAR; INTRAVENOUS at 13:20

## 2017-08-01 RX ADMIN — MIDAZOLAM 2 MG: 1 INJECTION INTRAMUSCULAR; INTRAVENOUS at 12:53

## 2017-08-01 RX ADMIN — ROCURONIUM BROMIDE 30 MG: 10 INJECTION INTRAVENOUS at 13:03

## 2017-08-01 RX ADMIN — SODIUM CHLORIDE, POTASSIUM CHLORIDE, SODIUM LACTATE AND CALCIUM CHLORIDE: 600; 310; 30; 20 INJECTION, SOLUTION INTRAVENOUS at 13:40

## 2017-08-01 RX ADMIN — FAMOTIDINE 20 MG: 10 INJECTION, SOLUTION INTRAVENOUS at 11:26

## 2017-08-01 RX ADMIN — HYDROCODONE BITARTRATE AND ACETAMINOPHEN 1 TABLET: 7.5; 325 TABLET ORAL at 14:51

## 2017-08-01 RX ADMIN — SODIUM CHLORIDE, POTASSIUM CHLORIDE, SODIUM LACTATE AND CALCIUM CHLORIDE 9 ML/HR: 600; 310; 30; 20 INJECTION, SOLUTION INTRAVENOUS at 11:26

## 2017-08-01 RX ADMIN — FENTANYL CITRATE 100 MCG: 50 INJECTION INTRAMUSCULAR; INTRAVENOUS at 13:03

## 2017-08-01 RX ADMIN — LIDOCAINE HYDROCHLORIDE 80 MG: 20 INJECTION, SOLUTION INFILTRATION; PERINEURAL at 13:03

## 2017-08-01 RX ADMIN — FENTANYL CITRATE 50 MCG: 50 INJECTION INTRAMUSCULAR; INTRAVENOUS at 14:05

## 2017-08-01 RX ADMIN — PHENYLEPHRINE HYDROCHLORIDE 100 MCG: 10 INJECTION INTRAVENOUS at 13:07

## 2017-08-01 RX ADMIN — PROPOFOL 80 MG: 10 INJECTION, EMULSION INTRAVENOUS at 13:03

## 2017-08-01 RX ADMIN — KETOROLAC TROMETHAMINE 30 MG: 30 INJECTION, SOLUTION INTRAMUSCULAR; INTRAVENOUS at 13:54

## 2017-08-01 RX ADMIN — CEFAZOLIN SODIUM 2 G: 2 INJECTION, SOLUTION INTRAVENOUS at 13:02

## 2017-08-01 RX ADMIN — PHENYLEPHRINE HYDROCHLORIDE 100 MCG: 10 INJECTION INTRAVENOUS at 13:30

## 2017-08-01 NOTE — ANESTHESIA POSTPROCEDURE EVALUATION
"Patient: Feliciano Noguera    Procedure Summary     Date Anesthesia Start Anesthesia Stop Room / Location    08/01/17 1257 1419  [facility]  /  [facility]       Procedure Diagnosis Surgeon Provider    CHOLECYSTECTOMY LAPAROSCOPIC INTRAOPERATIVE CHOLANGIOGRAM (N/A Abdomen) Calculus of gallbladder without cholecystitis without obstruction  (Calculus of gallbladder without cholecystitis without obstruction [K80.20]) MD Brian Escobedo MD          Anesthesia Type: general  Last vitals  BP   146/97 (08/01/17 1500)    Temp   36.3 °C (97.4 °F) (08/01/17 1500)    Pulse   78 (08/01/17 1500)   Resp   18 (08/01/17 1500)    SpO2   95 % (08/01/17 1500)      Post Anesthesia Care and Evaluation    Patient location during evaluation: PACU  Patient participation: complete - patient participated  Level of consciousness: awake and alert  Pain management: adequate  Airway patency: patent  Anesthetic complications: No anesthetic complications    Cardiovascular status: acceptable  Respiratory status: acceptable  Hydration status: acceptable    Comments: /97  Pulse 78  Temp 36.3 °C (97.4 °F) (Temporal Artery )   Resp 18  Ht 71\" (180.3 cm)  SpO2 95%              "

## 2017-08-01 NOTE — OP NOTE
PREOPERATIVE DIAGNOSIS:   Cholelithiasis.    POSTOPERATIVE DIAGNOSIS:   Cholelithiasis.  Chronic cholecystitis.    PROCEDURE:   Laparoscopic cholecystectomy with intraoperative fluoroscopic cholangiogram.    SURGEON: Bola Patel M.D.    ASSISTANT: Joey King PA-C.   SPECIMENS: Gallbladder.  INTRAOPERATIVE COMPLICATIONS: None.  ANESTHESIA: General.  BLOOD LOSS:  10 mL.  COUNTS: Needle and sponge counts correct.     INDICATIONS: This is a pleasant patient who presented to the hospital- and subsequently the office- with biliary symptoms and imaging studies showing gallstones.    DESCRIPTION OF PROCEDURE: The patient was brought to the operating room in stable condition. Perioperative antibiotics were given and sequential compression devices were in place. He was then positioned supine on the operating room table. General anesthesia was induced without difficulty.    Access to the peritoneum was achieved in the supraumbilical position by a cutdown technique. A blunt 12-mm trocar was secured to the fascia with 0 Vicryl stay sutures. The abdomen was then insufflated to 15 mmHg pressure with carbon dioxide. A brief survey of the abdominal cavity revealed no evidence of injury from insertion of the trocar, or no other intraabdominal pathology. The patient was placed in a reverse Trendelenburg position with the left-side tilted slightly down. Three additional 5-mm trocars were placed- in the subxyphoid, right subcostal, and right flank positions. This was under laparoscopic vision.       There were adhesions between the gallbladder and omentum, and the gallbladder wall was slightly thickened, consistent with chronic cholecystitis. The peritoneum at the base of the gallbladder was scored from laterally to medially, terminating at the level of the cystic artery. The peritoneum was gently stripped down, exposing the cystic duct. After completely exposing the cystic duct, a retro-cystic window was created. A firm  critical view was obtained, visualizing the cystic duct and cystic artery. There was no evidence of tenting of the common bile duct.     A single clip was placed on the cystic duct proximally.  A small incision was made in the cystic duct with laparoscopic scissors just distal to the clip.  A cholangiogram catheter was introduced into the abdominal cavity.  It was directed into the cystic duct.  It was held in place with a Hemoclip.  A fluoroscopic cholangiogram was obtained.  I was able to see flow of contrast into the duodenum.  I was able to see the entire common bile duct.  I was able to see the branches of the hepatic ducts.  There were no intraluminal filling defects.  The cystic duct cholangiogram catheter was removed.    The cystic duct and artery were clipped and divided. The gallbladder was elevated off the liver bed with electrocautery. An effort to careful and meticulous hemostasis was undertaken. The gallbladder was placed in an endoscopic retrieval bag. It was removed through the umbilical trocar site without difficulty.    A final survey of the operative site was undertaken, and there was no evidence of bleeding or intrabdominal injury. The insufflation was evacuated as the trocars were removed under laparoscopic vision. The umbilical fascia was closed with interrupted 0 Vicryl suture. The skin incisions were closed with 4-0 Monocryl and adhesive strips.    At the end of the case, all needle and sponge counts were correct, and he was extubated.

## 2017-08-01 NOTE — PLAN OF CARE
Problem: Perioperative Period (Adult)  Goal: Signs and Symptoms of Listed Potential Problems Will be Absent or Manageable (Perioperative Period)  Outcome: Ongoing (interventions implemented as appropriate)    08/01/17 1503   Perioperative Period   Problems Assessed (Perioperative Period) wound complications;embolism;hemorrhage;hypothermia;hypoxia/hypoxemia   Problems Present (Perioperative Period) pain

## 2017-08-01 NOTE — ANESTHESIA PROCEDURE NOTES
Airway  Urgency: elective    Airway not difficult    General Information and Staff    Patient location during procedure: OR  Anesthesiologist: MAXX MARTEL  CRNA: SELMA DOSS    Indications and Patient Condition  Indications for airway management: airway protection    Preoxygenated: yes  MILS not maintained throughout  Mask difficulty assessment: 1 - vent by mask    Final Airway Details  Final airway type: endotracheal airway      Successful airway: ETT  Cuffed: yes   Successful intubation technique: direct laryngoscopy  Endotracheal tube insertion site: oral  Blade: Sanket  Blade size: #4  ETT size: 7.5 mm  Cormack-Lehane Classification: grade I - full view of glottis  Placement verified by: chest auscultation and capnometry   Measured from: lips  ETT to lips (cm): 21  Number of attempts at approach: 1    Additional Comments  Dentition intact and unchanged. CBEBS.  +ETCO2.

## 2017-08-01 NOTE — PROGRESS NOTES
CHIEF COMPLAINT:    Cholelithiasis    HISTORY OF PRESENT ILLNESS:    Feliciano Noguera is a 67 y.o. male who was recently seen in the hospital with somewhat vague abdominal pain. He had gallstones identified on ultrasound. The pain went away, and a HIDA scan was negative for acute cholelithiasis. We decided to not persist with cholecystectomy, but since discharge the pain has continued . It now seems more classically like cholelithiasis. The pain is right upper quadrant and epigastric in location and radiates to the back. It is strongly associated with nausea, and it frequently follows meals.    Past Medical History:   Diagnosis Date   • Arthritis    • Gallstones    • Nausea        Past Surgical History:   Procedure Laterality Date   • COLONOSCOPY N/A 09/24/2009    Dr. Jamison Johnson   • ENDOSCOPY N/A 12/15/2008    Dr. Jamison Johnson   • REPLACEMENT TOTAL KNEE Left 05/08/2000    Dr. Elan Rhodes Jr.   • ROTATOR CUFF REPAIR Right 09/29/2009    Dr. Arik Harris   • TESTICLE EXCISIONAL BIOPSY  07/21/2009    Benign, Dr. Madi Callahan       No current facility-administered medications for this visit.   No current outpatient prescriptions on file.    Facility-Administered Medications Ordered in Other Visits:   •  ceFAZolin in dextrose (ANCEF) IVPB solution 2 g, 2 g, Intravenous, Once, Bola Patel MD  •  fentaNYL citrate (PF) (SUBLIMAZE) injection 100 mcg, 100 mcg, Intravenous, Q15 Min PRN, Brian Beck MD  •  lactated ringers infusion, 9 mL/hr, Intravenous, Continuous, Brian Beck MD, Last Rate: 9 mL/hr at 08/01/17 1126, 9 mL/hr at 08/01/17 1126  •  lidocaine PF 1% (XYLOCAINE) injection 0.5 mL, 0.5 mL, Injection, Once PRN, Brian Beck MD  •  midazolam (VERSED) injection 1 mg, 1 mg, Intravenous, Q5 Min PRN **OR** midazolam (VERSED) injection 2 mg, 2 mg, Intravenous, Q5 Min PRN, Brian Beck MD  •  sodium chloride 0.9 % flush 1-10 mL, 1-10 mL, Intravenous, PRN, Brian Beck MD    Allergies  "  Allergen Reactions   • Codeine GI Intolerance     Abdominal Pain       Family History   Problem Relation Age of Onset   • Diabetes Mother    • Tuberculosis Father    • Malig Hyperthermia Neg Hx        Social History     Social History   • Marital status:      Spouse name: N/A   • Number of children: N/A   • Years of education: N/A     Occupational History   • Not on file.     Social History Main Topics   • Smoking status: Current Every Day Smoker     Packs/day: 0.00     Types: Cigars   • Smokeless tobacco: Never Used      Comment: 2 CIGARS PER DAY, SOMETIMES LESS   • Alcohol use Yes      Comment: beer occasionally   • Drug use: No   • Sexual activity: Defer     Other Topics Concern   • Not on file     Social History Narrative       Review of Systems   Gastrointestinal: Positive for abdominal pain and nausea.   All other systems reviewed and are negative.      Objective     Pulse 75  Ht 71\" (180.3 cm)  Wt 185 lb 6.4 oz (84.1 kg)  SpO2 98%  BMI 25.86 kg/m2    Physical Exam   Constitutional: He is oriented to person, place, and time. He appears well-developed and well-nourished. No distress.   HENT:   Head: Normocephalic and atraumatic.   Eyes: Conjunctivae are normal. No scleral icterus.   Neck: No JVD present.   Cardiovascular: Normal rate, regular rhythm, normal heart sounds and intact distal pulses.    Pulmonary/Chest: Effort normal and breath sounds normal. No respiratory distress. He has no wheezes. He has no rales.   Abdominal: Soft. Bowel sounds are normal. He exhibits no distension. There is no tenderness. There is no guarding.   Musculoskeletal: He exhibits no edema or deformity.   Neurological: He is alert and oriented to person, place, and time.   Skin: Skin is warm and dry.   Psychiatric: He has a normal mood and affect.   Nursing note and vitals reviewed.      DIAGNOSTIC DATA:    I reviewed the recently performed CT images, US images, and HIDA " report.    ASSESSMENT:    Cholelithiasis    PLAN:    Discussed laparoscopic cholecystectomy.  Provided office literature.  We will schedule surgery

## 2017-08-02 LAB
CYTO UR: NORMAL
LAB AP CASE REPORT: NORMAL
Lab: NORMAL
PATH REPORT.FINAL DX SPEC: NORMAL
PATH REPORT.GROSS SPEC: NORMAL

## 2017-08-03 ENCOUNTER — HOSPITAL ENCOUNTER (OUTPATIENT)
Facility: HOSPITAL | Age: 67
Setting detail: OBSERVATION
Discharge: HOME | End: 2017-08-04
Attending: EMERGENCY MEDICINE | Admitting: SURGERY

## 2017-08-03 ENCOUNTER — TELEPHONE (OUTPATIENT)
Dept: SURGERY | Facility: CLINIC | Age: 67
End: 2017-08-03

## 2017-08-03 ENCOUNTER — APPOINTMENT (OUTPATIENT)
Dept: CT IMAGING | Facility: HOSPITAL | Age: 67
End: 2017-08-03

## 2017-08-03 DIAGNOSIS — J18.9 PNEUMONIA OF RIGHT LOWER LOBE DUE TO INFECTIOUS ORGANISM: ICD-10-CM

## 2017-08-03 DIAGNOSIS — R10.11 RIGHT UPPER QUADRANT ABDOMINAL PAIN: Primary | ICD-10-CM

## 2017-08-03 LAB
ALBUMIN SERPL-MCNC: 4 G/DL (ref 3.5–5.2)
ALBUMIN/GLOB SERPL: 1.4 G/DL
ALP SERPL-CCNC: 67 U/L (ref 39–117)
ALT SERPL W P-5'-P-CCNC: 56 U/L (ref 1–41)
ANION GAP SERPL CALCULATED.3IONS-SCNC: 12.8 MMOL/L
AST SERPL-CCNC: 28 U/L (ref 1–40)
BASOPHILS # BLD AUTO: 0.04 10*3/MM3 (ref 0–0.2)
BASOPHILS NFR BLD AUTO: 0.4 % (ref 0–1.5)
BILIRUB SERPL-MCNC: 0.5 MG/DL (ref 0.1–1.2)
BILIRUB UR QL STRIP: NEGATIVE
BUN BLD-MCNC: 17 MG/DL (ref 8–23)
BUN/CREAT SERPL: 18.3 (ref 7–25)
CALCIUM SPEC-SCNC: 9.6 MG/DL (ref 8.6–10.5)
CHLORIDE SERPL-SCNC: 102 MMOL/L (ref 98–107)
CLARITY UR: CLEAR
CO2 SERPL-SCNC: 23.2 MMOL/L (ref 22–29)
COLOR UR: YELLOW
CREAT BLD-MCNC: 0.93 MG/DL (ref 0.76–1.27)
DEPRECATED RDW RBC AUTO: 42.8 FL (ref 37–54)
EOSINOPHIL # BLD AUTO: 0.19 10*3/MM3 (ref 0–0.7)
EOSINOPHIL NFR BLD AUTO: 2 % (ref 0.3–6.2)
ERYTHROCYTE [DISTWIDTH] IN BLOOD BY AUTOMATED COUNT: 13.3 % (ref 11.5–14.5)
GFR SERPL CREATININE-BSD FRML MDRD: 81 ML/MIN/1.73
GLOBULIN UR ELPH-MCNC: 2.9 GM/DL
GLUCOSE BLD-MCNC: 93 MG/DL (ref 65–99)
GLUCOSE UR STRIP-MCNC: NEGATIVE MG/DL
HCT VFR BLD AUTO: 38.3 % (ref 40.4–52.2)
HGB BLD-MCNC: 12.5 G/DL (ref 13.7–17.6)
HGB UR QL STRIP.AUTO: NEGATIVE
IMM GRANULOCYTES # BLD: 0.04 10*3/MM3 (ref 0–0.03)
IMM GRANULOCYTES NFR BLD: 0.4 % (ref 0–0.5)
KETONES UR QL STRIP: NEGATIVE
LEUKOCYTE ESTERASE UR QL STRIP.AUTO: NEGATIVE
LIPASE SERPL-CCNC: 111 U/L (ref 13–60)
LYMPHOCYTES # BLD AUTO: 1.8 10*3/MM3 (ref 0.9–4.8)
LYMPHOCYTES NFR BLD AUTO: 19.3 % (ref 19.6–45.3)
MCH RBC QN AUTO: 28.5 PG (ref 27–32.7)
MCHC RBC AUTO-ENTMCNC: 32.6 G/DL (ref 32.6–36.4)
MCV RBC AUTO: 87.2 FL (ref 79.8–96.2)
MONOCYTES # BLD AUTO: 0.74 10*3/MM3 (ref 0.2–1.2)
MONOCYTES NFR BLD AUTO: 7.9 % (ref 5–12)
NEUTROPHILS # BLD AUTO: 6.5 10*3/MM3 (ref 1.9–8.1)
NEUTROPHILS NFR BLD AUTO: 70 % (ref 42.7–76)
NITRITE UR QL STRIP: NEGATIVE
PH UR STRIP.AUTO: 7.5 [PH] (ref 5–8)
PLATELET # BLD AUTO: 175 10*3/MM3 (ref 140–500)
PMV BLD AUTO: 10.5 FL (ref 6–12)
POTASSIUM BLD-SCNC: 4.2 MMOL/L (ref 3.5–5.2)
PROT SERPL-MCNC: 6.9 G/DL (ref 6–8.5)
PROT UR QL STRIP: NEGATIVE
RBC # BLD AUTO: 4.39 10*6/MM3 (ref 4.6–6)
SODIUM BLD-SCNC: 138 MMOL/L (ref 136–145)
SP GR UR STRIP: 1.01 (ref 1–1.03)
UROBILINOGEN UR QL STRIP: NORMAL
WBC NRBC COR # BLD: 9.31 10*3/MM3 (ref 4.5–10.7)

## 2017-08-03 PROCEDURE — G0378 HOSPITAL OBSERVATION PER HR: HCPCS

## 2017-08-03 PROCEDURE — 99285 EMERGENCY DEPT VISIT HI MDM: CPT

## 2017-08-03 PROCEDURE — 74177 CT ABD & PELVIS W/CONTRAST: CPT

## 2017-08-03 PROCEDURE — 96374 THER/PROPH/DIAG INJ IV PUSH: CPT

## 2017-08-03 PROCEDURE — 25010000002 HYDROMORPHONE PER 4 MG: Performed by: EMERGENCY MEDICINE

## 2017-08-03 PROCEDURE — 99024 POSTOP FOLLOW-UP VISIT: CPT | Performed by: SURGERY

## 2017-08-03 PROCEDURE — 83690 ASSAY OF LIPASE: CPT | Performed by: EMERGENCY MEDICINE

## 2017-08-03 PROCEDURE — 96361 HYDRATE IV INFUSION ADD-ON: CPT

## 2017-08-03 PROCEDURE — 80053 COMPREHEN METABOLIC PANEL: CPT | Performed by: EMERGENCY MEDICINE

## 2017-08-03 PROCEDURE — 71275 CT ANGIOGRAPHY CHEST: CPT

## 2017-08-03 PROCEDURE — 81003 URINALYSIS AUTO W/O SCOPE: CPT | Performed by: EMERGENCY MEDICINE

## 2017-08-03 PROCEDURE — 0 IOPAMIDOL PER 1 ML: Performed by: EMERGENCY MEDICINE

## 2017-08-03 PROCEDURE — 85025 COMPLETE CBC W/AUTO DIFF WBC: CPT | Performed by: EMERGENCY MEDICINE

## 2017-08-03 PROCEDURE — 25010000002 HYDROMORPHONE PER 4 MG: Performed by: SURGERY

## 2017-08-03 PROCEDURE — 96376 TX/PRO/DX INJ SAME DRUG ADON: CPT

## 2017-08-03 RX ORDER — NALOXONE HCL 0.4 MG/ML
0.1 VIAL (ML) INJECTION
Status: DISCONTINUED | OUTPATIENT
Start: 2017-08-03 | End: 2017-08-04 | Stop reason: HOSPADM

## 2017-08-03 RX ORDER — SODIUM CHLORIDE 0.9 % (FLUSH) 0.9 %
10 SYRINGE (ML) INJECTION AS NEEDED
Status: DISCONTINUED | OUTPATIENT
Start: 2017-08-03 | End: 2017-08-04 | Stop reason: HOSPADM

## 2017-08-03 RX ORDER — ONDANSETRON 2 MG/ML
4 INJECTION INTRAMUSCULAR; INTRAVENOUS EVERY 6 HOURS PRN
Status: DISCONTINUED | OUTPATIENT
Start: 2017-08-03 | End: 2017-08-04 | Stop reason: HOSPADM

## 2017-08-03 RX ORDER — OXYCODONE AND ACETAMINOPHEN 10; 325 MG/1; MG/1
1 TABLET ORAL EVERY 4 HOURS PRN
Status: DISCONTINUED | OUTPATIENT
Start: 2017-08-03 | End: 2017-08-04 | Stop reason: HOSPADM

## 2017-08-03 RX ORDER — HYDROMORPHONE HYDROCHLORIDE 1 MG/ML
0.5 INJECTION, SOLUTION INTRAMUSCULAR; INTRAVENOUS; SUBCUTANEOUS
Status: DISCONTINUED | OUTPATIENT
Start: 2017-08-03 | End: 2017-08-04 | Stop reason: HOSPADM

## 2017-08-03 RX ORDER — KETOROLAC TROMETHAMINE 30 MG/ML
15 INJECTION, SOLUTION INTRAMUSCULAR; INTRAVENOUS EVERY 6 HOURS
Status: DISCONTINUED | OUTPATIENT
Start: 2017-08-03 | End: 2017-08-04 | Stop reason: HOSPADM

## 2017-08-03 RX ORDER — SODIUM CHLORIDE 9 MG/ML
100 INJECTION, SOLUTION INTRAVENOUS CONTINUOUS
Status: DISCONTINUED | OUTPATIENT
Start: 2017-08-03 | End: 2017-08-04 | Stop reason: HOSPADM

## 2017-08-03 RX ORDER — ONDANSETRON 4 MG/1
4 TABLET, ORALLY DISINTEGRATING ORAL EVERY 6 HOURS PRN
Status: DISCONTINUED | OUTPATIENT
Start: 2017-08-03 | End: 2017-08-04 | Stop reason: HOSPADM

## 2017-08-03 RX ORDER — ONDANSETRON 4 MG/1
4 TABLET, FILM COATED ORAL EVERY 6 HOURS PRN
Status: DISCONTINUED | OUTPATIENT
Start: 2017-08-03 | End: 2017-08-04 | Stop reason: HOSPADM

## 2017-08-03 RX ORDER — HYDROMORPHONE HYDROCHLORIDE 1 MG/ML
0.5 INJECTION, SOLUTION INTRAMUSCULAR; INTRAVENOUS; SUBCUTANEOUS ONCE
Status: COMPLETED | OUTPATIENT
Start: 2017-08-03 | End: 2017-08-03

## 2017-08-03 RX ADMIN — HYDROMORPHONE HYDROCHLORIDE 0.5 MG: 1 INJECTION, SOLUTION INTRAMUSCULAR; INTRAVENOUS; SUBCUTANEOUS at 18:45

## 2017-08-03 RX ADMIN — HYDROMORPHONE HYDROCHLORIDE 0.5 MG: 1 INJECTION, SOLUTION INTRAMUSCULAR; INTRAVENOUS; SUBCUTANEOUS at 15:50

## 2017-08-03 RX ADMIN — HYDROMORPHONE HYDROCHLORIDE 0.5 MG: 1 INJECTION, SOLUTION INTRAMUSCULAR; INTRAVENOUS; SUBCUTANEOUS at 21:33

## 2017-08-03 RX ADMIN — IOPAMIDOL 95 ML: 755 INJECTION, SOLUTION INTRAVENOUS at 13:40

## 2017-08-03 RX ADMIN — SODIUM CHLORIDE 100 ML/HR: 9 INJECTION, SOLUTION INTRAVENOUS at 18:45

## 2017-08-03 NOTE — PROGRESS NOTES
Clinical Pharmacy Services: Medication History    Feliciano Noguera is a 67 y.o. male presenting to [facility] Emergency Department with chief complaint of: Abdominal pain     Past Medical History:  Past Medical History:   Diagnosis Date   • Arthritis    • Gallstones    • Nausea        Allergies   Allergen Reactions   • Codeine GI Intolerance     Abdominal Pain       Medication information was obtained from: Patients reported medication list as well as patients pharmacy     Pharmacy and Phone Number: [facility] [phone]    Medication notes:    Current Outpatient Medications:    Prior to Admission medications    Medication Sig Start Date End Date Taking? Authorizing Provider   HYDROcodone-acetaminophen (NORCO) 5-325 MG per tablet Take 1 tablet by mouth Every 6 (Six) Hours As Needed for Moderate Pain (4-6). 8/1/17  Yes Bola Patel MD   acetaminophen (TYLENOL) 325 MG tablet Take 2 tablets by mouth Every 4 (Four) Hours As Needed for Mild Pain (1-3). 6/30/17   Duglas Adames MD   naproxen sodium (ALEVE) 220 MG tablet Take 220 mg by mouth 2 (Two) Times a Day As Needed for Mild Pain (1-3).    Historical Provider, MD       This medication list is complete to the best of my knowledge as of 8/3/2017    Please call if questions.     Avila Jackson, Student Pharmacist

## 2017-08-03 NOTE — ED PROVIDER NOTES
EMERGENCY DEPARTMENT ENCOUNTER    CHIEF COMPLAINT  Chief Complaint: abd pain  History given by: patient, spouse  History limited by: nothing  Room Number: 27/27  PMD: Daniel Mahan MD  General surgeon- Dr. Patel    HPI:  Pt is a 67 y.o. male who presents complaining of worsening, sharp, stabbing constant periumbilical and RUQ abd pain for the last 2 days s/p cholecystectomy. Pt states that his pain is worse with deep breathing. Pt denies fever, recent weight change, sore throat, ear pain, vision changes, eye pain, cough, dysuria, LE painrash, neck pain or CP but complains of mild SOB and lower back pain. Pt received morphine and zofran en route.    Duration:  2 days  Onset: gradual  Timing: constant  Location: epigastric, RUQ  Radiation: none  Quality: sharp, stabbing  Intensity/Severity: severe  Progression: worsening  Associated Symptoms: mild SOB, lower back pain  Aggravating Factors: deep breathing  Alleviating Factors: none  Previous Episodes: Pt denies similar symptoms previously  Treatment before arrival: Pt received morphine and zofran en route.    PAST MEDICAL HISTORY  Active Ambulatory Problems     Diagnosis Date Noted   • Precordial pain 06/28/2017   • Generalized abdominal pain 06/28/2017   • Hyperglycemia 06/28/2017   • Fatigue 06/28/2017   • Snoring 06/28/2017   • Uncontrolled daytime somnolence 06/28/2017   • Benign prostatic hyperplasia without lower urinary tract symptoms 06/28/2017   • Tobacco abuse 06/28/2017   • Calculus of gallbladder without cholecystitis without obstruction 07/28/2017     Resolved Ambulatory Problems     Diagnosis Date Noted   • No Resolved Ambulatory Problems     Past Medical History:   Diagnosis Date   • Arthritis    • Gallstones    • Nausea        PAST SURGICAL HISTORY  Past Surgical History:   Procedure Laterality Date   • CHOLECYSTECTOMY     • CHOLECYSTECTOMY WITH INTRAOPERATIVE CHOLANGIOGRAM N/A 8/1/2017    Procedure: CHOLECYSTECTOMY LAPAROSCOPIC INTRAOPERATIVE  CHOLANGIOGRAM;  Surgeon: Bola Patel MD;  Location:  [facility] [place];  Service:    • COLONOSCOPY N/A 09/24/2009    Dr. Jamison Johnson   • ENDOSCOPY N/A 12/15/2008    Dr. Jamison Johnson   • REPLACEMENT TOTAL KNEE Left 05/08/2000    Dr. Elan Rhodes Jr.   • ROTATOR CUFF REPAIR Right 09/29/2009    Dr. Arik Harris   • TESTICLE EXCISIONAL BIOPSY  07/21/2009    Benign, Dr. Madi Callahan       FAMILY HISTORY  Family History   Problem Relation Age of Onset   • Diabetes Mother    • Tuberculosis Father    • Malig Hyperthermia Neg Hx        SOCIAL HISTORY  Social History     Social History   • Marital status:      Spouse name: N/A   • Number of children: N/A   • Years of education: N/A     Occupational History   • Not on file.     Social History Main Topics   • Smoking status: Current Every Day Smoker     Packs/day: 0.00     Types: Cigars   • Smokeless tobacco: Never Used      Comment: 2 CIGARS PER DAY, SOMETIMES LESS   • Alcohol use Yes      Comment: beer occasionally   • Drug use: No   • Sexual activity: Defer     Other Topics Concern   • Not on file     Social History Narrative   • No narrative on file       ALLERGIES  Codeine    REVIEW OF SYSTEMS  Review of Systems   Constitutional: Negative.    HENT: Negative.    Eyes: Negative.    Respiratory: Positive for shortness of breath (mild).    Cardiovascular: Negative.    Gastrointestinal: Positive for abdominal pain.   Genitourinary: Negative.    Musculoskeletal: Positive for back pain (lower).   Skin: Negative.    Neurological: Negative.    Psychiatric/Behavioral: Negative.        PHYSICAL EXAM  ED Triage Vitals   Temp Heart Rate Resp BP SpO2   08/03/17 1019 08/03/17 1019 08/03/17 1019 08/03/17 1019 08/03/17 1019   98 °F (36.7 °C) 74 18 134/84 97 %      Temp src Heart Rate Source Patient Position BP Location FiO2 (%)   08/03/17 1019 08/03/17 1019 -- -- --   Tympanic Monitor          Physical Exam   Constitutional: He is oriented to person, place, and time  and well-developed, well-nourished, and in no distress.   HENT:   Head: Normocephalic and atraumatic.   Mouth/Throat: Mucous membranes are dry (mild).   Eyes: EOM are normal. Pupils are equal, round, and reactive to light.   Neck: Normal range of motion. Neck supple.   Cardiovascular: Normal rate, regular rhythm and normal heart sounds.    No murmur heard.  Pulmonary/Chest: Effort normal and breath sounds normal. No respiratory distress.   Abdominal: Soft. There is tenderness (moderate to mid and right abdomen). There is no rebound and no guarding.   Musculoskeletal: Normal range of motion. He exhibits no edema.   Neurological: He is alert and oriented to person, place, and time. He has normal sensation and normal strength.   Skin: Skin is warm and dry.   Psychiatric: Mood and affect normal.   Nursing note and vitals reviewed.      LAB RESULTS  Lab Results (last 24 hours)     Procedure Component Value Units Date/Time    CBC & Differential [[id number]] Collected:  08/03/17 1112    Specimen:  Blood Updated:  08/03/17 1129    Narrative:       The following orders were created for panel order CBC & Differential.  Procedure                               Abnormality         Status                     ---------                               -----------         ------                     CBC Auto Differential[[id number]]        Abnormal            Final result                 Please view results for these tests on the individual orders.    Comprehensive Metabolic Panel [[id number]]  (Abnormal) Collected:  08/03/17 1112    Specimen:  Blood from Arm, Right Updated:  08/03/17 1141     Glucose 93 mg/dL      BUN 17 mg/dL      Creatinine 0.93 mg/dL      Sodium 138 mmol/L      Potassium 4.2 mmol/L      Chloride 102 mmol/L      CO2 23.2 mmol/L      Calcium 9.6 mg/dL      Total Protein 6.9 g/dL      Albumin 4.00 g/dL      ALT (SGPT) 56 (H) U/L      AST (SGOT) 28 U/L      Alkaline Phosphatase 67 U/L      Total Bilirubin 0.5 mg/dL       eGFR Non African Amer 81 mL/min/1.73      Globulin 2.9 gm/dL      A/G Ratio 1.4 g/dL      BUN/Creatinine Ratio 18.3     Anion Gap 12.8 mmol/L     Lipase [[id number]]  (Abnormal) Collected:  08/03/17 1112    Specimen:  Blood from Arm, Right Updated:  08/03/17 1141     Lipase 111 (H) U/L     CBC Auto Differential [[id number]]  (Abnormal) Collected:  08/03/17 1112    Specimen:  Blood from Arm, Right Updated:  08/03/17 1129     WBC 9.31 10*3/mm3      RBC 4.39 (L) 10*6/mm3      Hemoglobin 12.5 (L) g/dL      Hematocrit 38.3 (L) %      MCV 87.2 fL      MCH 28.5 pg      MCHC 32.6 g/dL      RDW 13.3 %      RDW-SD 42.8 fl      MPV 10.5 fL      Platelets 175 10*3/mm3      Neutrophil % 70.0 %      Lymphocyte % 19.3 (L) %      Monocyte % 7.9 %      Eosinophil % 2.0 %      Basophil % 0.4 %      Immature Grans % 0.4 %      Neutrophils, Absolute 6.50 10*3/mm3      Lymphocytes, Absolute 1.80 10*3/mm3      Monocytes, Absolute 0.74 10*3/mm3      Eosinophils, Absolute 0.19 10*3/mm3      Basophils, Absolute 0.04 10*3/mm3      Immature Grans, Absolute 0.04 (H) 10*3/mm3     Urinalysis With / Culture If Indicated [[id number]]  (Normal) Collected:  08/03/17 1121    Specimen:  Urine from Urine, Clean Catch Updated:  08/03/17 1140     Color, UA Yellow     Appearance, UA Clear     pH, UA 7.5     Specific Gravity, UA 1.014     Glucose, UA Negative     Ketones, UA Negative     Bilirubin, UA Negative     Blood, UA Negative     Protein, UA Negative     Leuk Esterase, UA Negative     Nitrite, UA Negative     Urobilinogen, UA 1.0 E.U./dL    Narrative:       Urine microscopic not indicated.          I ordered the above labs and reviewed the results    RADIOLOGY  CT Angiogram Chest With Contrast         CT Abdomen Pelvis With Contrast            1408- Reviewed pt's CTA Chest, which shows RLL airspace disease (? Developing pneumonia) but no no P.E.. Pt's CT Abd/Pelvis shows a complex fluid collection c/w a possible bile leak. Independently viewed by me.  Interpreted by radiologist. Discussed with Dr. Draper.      I ordered the above noted radiological studies. Interpreted by radiologist. Discussed with radiologist (Dr. Draper). Reviewed by me in PACS.       PROCEDURES  Procedures      PROGRESS AND CONSULTS  ED Course   Comment By Time   10:39 AM  68 yo s/p nilda on 8/1 by Dr Patel presents with increased periumbilical and RUQ pain today.  Given morphine and zofran per EMS. Mario Whitehead MD 08/03 1040     1037- Pt declined pain medication. Discussed the plan to order lab results prior to disposition. Pt and family agree with the plan and all questions were addressed.    1059- Ordered blood work, lipase and UA for further evaluation.    1156- Placed call to Dr. Patel (general surgery) for consult.     1157- Rechecked pt. Pt is resting comfortably. Notified pt and family of the pt's lab results, including his normal WBC count and mildly elevated Lipase. On re-exam, the pt appears more comfortable. Discussed the plan to consult Dr. Patel (general surgery) prior to disposition. Pt and family agree with the plan and all questions were addressed.    12:13 PM  Latest vital signs   BP- 131/91 HR- 75 Temp- 98 °F (36.7 °C) (Tympanic) O2 sat- 98%    1216- Discussed the pt's case with Dr. Patel (general surgery), who states that the pt called his office complaining of SOB. He requested that I order CTA Chest and CT Abd/Pelvis, which I will do.    1218- Rechecked pt. Pt is resting comfortably. Notified pt and family of the consult with Dr. Patel (general surgery) and the plan to order a CTA Chest to r/o P.E.. and to order a CT Abd/Pelvis for further evaluation of his pain. Pt and family agree with the plan and all questions were addressed.    1223 Ordered CTA Chest and CT Abd/Pelvis for further evaluation.    1411- Placed call to Dr. Patel (general surgery) for consult.    1412- Rechecked pt. Pt is resting comfortably. Notified pt and family of the  pt's imaging results, including the fluid collection and RLL airspace disease. Discussed the plan to consult Dr. Patel (general surgery) and measure the pt's RA oxygen saturations prior to disposition. Pt states that he would prefer to be admitted. Pt and family agree with the plan and all questions were addressed.     2:24 PM  Latest vital signs   BP- 129/91 HR- 75 Temp- 98 °F (36.7 °C) (Tympanic) O2 sat- 97%    1433- Discussed the pt's case with Dr. Patel (general surgery) who agrees to admit the pt to a Med/Surg bed. He requests that  I not order abx at this time.    1500- Rechecked pt. Pt is resting comfortably. Notified pt and family of the consults with Dr. Patel (general surgery). Discussed the plan to admit the pt for further evaluation of the pt's symptoms. Pt and family agree with the plan and all questions were addressed.      MEDICAL DECISION MAKING  Results were reviewed/discussed with the patient and they were also made aware of online access. Pt also made aware that some labs, such as cultures, will not be resulted during ER visit and follow up with PMD is necessary.     MDM  Number of Diagnoses or Management Options     Amount and/or Complexity of Data Reviewed  Clinical lab tests: ordered and reviewed (Lipase=111)  Tests in the radiology section of CPT®: ordered and reviewed (Pt's CT Abd/Pelvis shows a complex fluid collection c/w a possible bile leak.)  Discussion of test results with the performing providers: yes (D/w Dr. Draper)  Decide to obtain previous medical records or to obtain history from someone other than the patient: yes  Obtain history from someone other than the patient: yes (spouse)  Review and summarize past medical records: yes (Pt had a cholecystectomy on 8-1-17 by Dr. Patel (general surgery))  Discuss the patient with other providers: yes (D/w Dr. Patel (general surgery))  Independent visualization of images, tracings, or specimens: yes            DIAGNOSIS  Final diagnoses:   Right upper quadrant abdominal pain   Pneumonia of right lower lobe due to infectious organism, possible       DISPOSITION  ADMISSION    Discussed treatment plan and reason for admission with pt/family and admitting physician.  Pt/family voiced understanding of the plan for admission for further testing/treatment as needed.       Latest Documented Vital Signs:  As of 3:02 PM  BP- 129/91 HR- 75 Temp- 98 °F (36.7 °C) (Tympanic) O2 sat- 97%    --  Documentation assistance provided by spencer Burgess for Dr. Whitehead.  Information recorded by the spencer was done at my direction and has been verified and validated by me.     Janel Burgess  08/03/17 1301       Janel Burgess  08/03/17 1508       Mario Whitehead MD  08/03/17 1508

## 2017-08-04 VITALS
RESPIRATION RATE: 16 BRPM | BODY MASS INDEX: 24.79 KG/M2 | DIASTOLIC BLOOD PRESSURE: 76 MMHG | WEIGHT: 177.1 LBS | OXYGEN SATURATION: 94 % | SYSTOLIC BLOOD PRESSURE: 136 MMHG | TEMPERATURE: 97.1 F | HEART RATE: 81 BPM | HEIGHT: 71 IN

## 2017-08-04 LAB
ANION GAP SERPL CALCULATED.3IONS-SCNC: 12 MMOL/L
BUN BLD-MCNC: 17 MG/DL (ref 8–23)
BUN/CREAT SERPL: 17.2 (ref 7–25)
CALCIUM SPEC-SCNC: 8.9 MG/DL (ref 8.6–10.5)
CHLORIDE SERPL-SCNC: 102 MMOL/L (ref 98–107)
CO2 SERPL-SCNC: 24 MMOL/L (ref 22–29)
CREAT BLD-MCNC: 0.99 MG/DL (ref 0.76–1.27)
GFR SERPL CREATININE-BSD FRML MDRD: 75 ML/MIN/1.73
GLUCOSE BLD-MCNC: 100 MG/DL (ref 65–99)
LIPASE SERPL-CCNC: 37 U/L (ref 13–60)
POTASSIUM BLD-SCNC: 4.3 MMOL/L (ref 3.5–5.2)
SODIUM BLD-SCNC: 138 MMOL/L (ref 136–145)

## 2017-08-04 PROCEDURE — 96361 HYDRATE IV INFUSION ADD-ON: CPT

## 2017-08-04 PROCEDURE — 80048 BASIC METABOLIC PNL TOTAL CA: CPT | Performed by: SURGERY

## 2017-08-04 PROCEDURE — 94799 UNLISTED PULMONARY SVC/PX: CPT

## 2017-08-04 PROCEDURE — 99024 POSTOP FOLLOW-UP VISIT: CPT | Performed by: SURGERY

## 2017-08-04 PROCEDURE — 83690 ASSAY OF LIPASE: CPT | Performed by: SURGERY

## 2017-08-04 PROCEDURE — G0378 HOSPITAL OBSERVATION PER HR: HCPCS

## 2017-08-04 PROCEDURE — 96376 TX/PRO/DX INJ SAME DRUG ADON: CPT

## 2017-08-04 PROCEDURE — 25010000002 KETOROLAC TROMETHAMINE PER 15 MG: Performed by: SURGERY

## 2017-08-04 PROCEDURE — 96375 TX/PRO/DX INJ NEW DRUG ADDON: CPT

## 2017-08-04 PROCEDURE — 25010000002 HYDROMORPHONE PER 4 MG: Performed by: SURGERY

## 2017-08-04 RX ORDER — OXYCODONE AND ACETAMINOPHEN 10; 325 MG/1; MG/1
1 TABLET ORAL EVERY 6 HOURS PRN
Qty: 20 TABLET | Refills: 0 | Status: SHIPPED | OUTPATIENT
Start: 2017-08-04 | End: 2017-08-13

## 2017-08-04 RX ADMIN — KETOROLAC TROMETHAMINE 15 MG: 30 INJECTION, SOLUTION INTRAMUSCULAR at 06:17

## 2017-08-04 RX ADMIN — KETOROLAC TROMETHAMINE 15 MG: 30 INJECTION, SOLUTION INTRAMUSCULAR at 00:08

## 2017-08-04 RX ADMIN — HYDROMORPHONE HYDROCHLORIDE 0.5 MG: 1 INJECTION, SOLUTION INTRAMUSCULAR; INTRAVENOUS; SUBCUTANEOUS at 08:45

## 2017-08-04 RX ADMIN — SODIUM CHLORIDE 100 ML/HR: 9 INJECTION, SOLUTION INTRAVENOUS at 04:53

## 2017-08-04 RX ADMIN — KETOROLAC TROMETHAMINE 15 MG: 30 INJECTION, SOLUTION INTRAMUSCULAR at 11:00

## 2017-08-04 RX ADMIN — HYDROMORPHONE HYDROCHLORIDE 0.5 MG: 1 INJECTION, SOLUTION INTRAMUSCULAR; INTRAVENOUS; SUBCUTANEOUS at 04:53

## 2017-08-04 NOTE — H&P
CHIEF COMPLAINT:    Abdominal pain    HISTORY OF PRESENT ILLNESS:    Feliciano Noguera is a 67 y.o. male who underwent Laparoscopic cholecystectomy two days ago. Today he has experienced increasing pain in the right upper quadrant with inspiration. Bowel movements are normal. There is no emesis. There is no fever.    Past Medical History:   Diagnosis Date   • Arthritis    • Gallstones    • Nausea        Past Surgical History:   Procedure Laterality Date   • CHOLECYSTECTOMY     • CHOLECYSTECTOMY WITH INTRAOPERATIVE CHOLANGIOGRAM N/A 8/1/2017    Procedure: CHOLECYSTECTOMY LAPAROSCOPIC INTRAOPERATIVE CHOLANGIOGRAM;  Surgeon: Bola Patel MD;  Location: [facility] [place];  Service:    • COLONOSCOPY N/A 09/24/2009    Dr. Jamison Johnson   • ENDOSCOPY N/A 12/15/2008    Dr. Jamison Johnson   • REPLACEMENT TOTAL KNEE Left 05/08/2000    Dr. Elan Rhodes Jr.   • ROTATOR CUFF REPAIR Right 09/29/2009    Dr. Arik Harris   • TESTICLE EXCISIONAL BIOPSY  07/21/2009    Benign, Dr. Madi Callahan         Current Facility-Administered Medications:   •  HYDROmorphone (DILAUDID) injection 0.5 mg, 0.5 mg, Intravenous, Q2H PRN, 0.5 mg at 08/03/17 2133 **AND** naloxone (NARCAN) injection 0.1 mg, 0.1 mg, Intravenous, Q5 Min PRN, Bola Patel MD  •  ondansetron (ZOFRAN) tablet 4 mg, 4 mg, Oral, Q6H PRN **OR** ondansetron ODT (ZOFRAN-ODT) disintegrating tablet 4 mg, 4 mg, Oral, Q6H PRN **OR** ondansetron (ZOFRAN) injection 4 mg, 4 mg, Intravenous, Q6H PRN, Bola Patel MD  •  oxyCODONE-acetaminophen (PERCOCET)  MG per tablet 1 tablet, 1 tablet, Oral, Q4H PRN, Bola Patel MD  •  Insert peripheral IV, , , Once **AND** sodium chloride 0.9 % flush 10 mL, 10 mL, Intravenous, PRN, Mario Whitehead MD  •  sodium chloride 0.9 % infusion, 100 mL/hr, Intravenous, Continuous, Bola Patel MD, Last Rate: 100 mL/hr at 08/03/17 1845, 100 mL/hr at 08/03/17 1845    Allergies   Allergen Reactions   • Codeine GI  "Intolerance     Abdominal Pain       Family History   Problem Relation Age of Onset   • Diabetes Mother    • Tuberculosis Father    • Malig Hyperthermia Neg Hx        Social History     Social History   • Marital status:      Spouse name: N/A   • Number of children: N/A   • Years of education: N/A     Occupational History   • Not on file.     Social History Main Topics   • Smoking status: Current Every Day Smoker     Packs/day: 0.00     Types: Cigars   • Smokeless tobacco: Never Used      Comment: 2 CIGARS PER DAY, SOMETIMES LESS   • Alcohol use Yes      Comment: beer occasionally   • Drug use: No   • Sexual activity: Defer     Other Topics Concern   • Not on file     Social History Narrative   • No narrative on file       Review of Systems   Constitutional: Negative for fever.   Respiratory: Positive for shortness of breath.    Gastrointestinal: Positive for abdominal pain. Negative for abdominal distention, blood in stool and vomiting.       Objective     /85 (BP Location: Left arm, Patient Position: Lying)  Pulse 86  Temp 97.8 °F (36.6 °C) (Oral)   Resp 16  Ht 71\" (180.3 cm)  Wt 177 lb 1.6 oz (80.3 kg)  SpO2 95%  BMI 24.7 kg/m2    Physical Exam   Constitutional: He is oriented to person, place, and time. He appears well-developed and well-nourished. No distress.   HENT:   Head: Normocephalic and atraumatic.   Eyes: Conjunctivae are normal. No scleral icterus.   Cardiovascular: Normal rate, regular rhythm, normal heart sounds and intact distal pulses.    No murmur heard.  Pulmonary/Chest: Effort normal and breath sounds normal. No respiratory distress. He has no wheezes. He has no rales.   Abdominal: Soft. Bowel sounds are normal. He exhibits no distension and no mass. There is tenderness (right upper quadrant. ). There is no rebound and no guarding.   Incisions are okay   Musculoskeletal: He exhibits no edema or deformity.   Neurological: He is alert and oriented to person, place, and time. "   Skin: Skin is warm and dry.   Psychiatric: He has a normal mood and affect.   Nursing note and vitals reviewed.      DIAGNOSTIC DATA:    WBC 9.31, Hgb 12.5.  Creat 0.93, LFT's almost all normal. ALT is 56.  Lipase is slightly up 111 which is not particularly worrisome  I personally reviewed the CT images that show a very small fluid collection in the gallbladder fossa that I think is nonspecific. There is no peripancreatic inflammation.    ASSESSMENT:    Excessive pain following laparoscopic cholecystectomy  Slightly elevated lipase    PLAN:    Allow diet  Use oxycodone and IV antiemetics  Repeat lipase in the morning

## 2017-08-04 NOTE — DISCHARGE SUMMARY
DATE OF ADMISSION:  8/3/17  DATE OF DISCHARGE:  8/4/17    ATTENDING:        Bola Patel M.D.       GENERAL SURGERY    PRIMARY CARE PHYSICIAN: CHARLI    CONSULTS:    None    PRINCIPAL DIAGNOSIS:     Postoperative pain    DISCHARGE DIAGNOSES:     History of recent laparoscopic cholecystectomy    HOSPITAL PROCEDURES:     None    HOSPITALCOURSE:   He was admitted through the ED with postoperative pain in excess of that which is typical. He was taking Norco, and he was not experiencing sufficient relief to get out of bed and ambulate unassisted. He was unable to take a deep inspiration because of pain. Evaluation in the ED was unremarkable, but he was admitted for pain control. He was sent home the next day with Percocet for pain control    ACTIVITY:  Okay to shower.  Ambulate and climb stairs as tolerated.  No lifting over 10 lbs for 2 weeks.  Okay to drive if not taking pain medications.    DIET:  Resume usual diet.    FOLLOW UP:  With Dr Patel in 2 weeks. He is instructed to call [phone] for an appointment.

## 2017-08-04 NOTE — PLAN OF CARE
Problem: Patient Care Overview (Adult)  Goal: Plan of Care Review  Outcome: Ongoing (interventions implemented as appropriate)    08/03/17 2014   Coping/Psychosocial Response Interventions   Plan Of Care Reviewed With patient;spouse   Patient Care Overview   Progress no change   Outcome Evaluation   Outcome Summary/Follow up Plan Admit this evening from the ED acute RUQ pain and SOA. Lap Estee. on 8/1. Denies any SOA at this time. VSS on room air. Pt continues to complain of sharp, intense abd pain to RUQ, under ribs. Worsens with movement. Gets some relief from PRN IV Dilaudid, but never completely goes away. CTA and CT chest/abd completed. Collection of fluid in gall bladder pocket, some free air. No abx ordered. Dr. Patel saw pt and admission orders placed. NS @ 100.        Goal: Adult Individualization and Mutuality  Outcome: Ongoing (interventions implemented as appropriate)    Problem: Pain, Acute (Adult)  Goal: Identify Related Risk Factors and Signs and Symptoms  Outcome: Ongoing (interventions implemented as appropriate)    Problem: Infection, Risk/Actual (Adult)  Goal: Identify Related Risk Factors and Signs and Symptoms  Outcome: Ongoing (interventions implemented as appropriate)  Goal: Infection Prevention/Resolution  Outcome: Ongoing (interventions implemented as appropriate)

## 2017-08-15 ENCOUNTER — OFFICE VISIT (OUTPATIENT)
Dept: SURGERY | Facility: CLINIC | Age: 67
End: 2017-08-15

## 2017-08-15 DIAGNOSIS — K80.10 CALCULUS OF GALLBLADDER WITH CHRONIC CHOLECYSTITIS WITHOUT OBSTRUCTION: ICD-10-CM

## 2017-08-15 DIAGNOSIS — K81.9 CHOLECYSTITIS: Primary | ICD-10-CM

## 2017-08-15 PROBLEM — K80.20 CALCULUS OF GALLBLADDER WITHOUT CHOLECYSTITIS WITHOUT OBSTRUCTION: Status: RESOLVED | Noted: 2017-07-28 | Resolved: 2017-08-15

## 2017-08-15 PROBLEM — R10.84 GENERALIZED ABDOMINAL PAIN: Status: RESOLVED | Noted: 2017-06-28 | Resolved: 2017-08-15

## 2017-08-15 PROCEDURE — 99024 POSTOP FOLLOW-UP VISIT: CPT | Performed by: SURGERY

## 2017-08-15 RX ORDER — AMOXICILLIN AND CLAVULANATE POTASSIUM 875; 125 MG/1; MG/1
1 TABLET, FILM COATED ORAL 2 TIMES DAILY
Qty: 10 TABLET | Refills: 0 | Status: SHIPPED | OUTPATIENT
Start: 2017-08-15 | End: 2018-11-06

## 2017-08-15 RX ORDER — AMOXICILLIN AND CLAVULANATE POTASSIUM 875; 125 MG/1; MG/1
1 TABLET, FILM COATED ORAL EVERY 12 HOURS SCHEDULED
Status: DISCONTINUED | OUTPATIENT
Start: 2017-08-15 | End: 2017-08-15

## 2017-08-15 NOTE — PROGRESS NOTES
CHIEF COMPLAINT:    Laparoscopic cholecystectomy    HISTORY OF PRESENT ILLNESS:    Feliciano Noguera is a 67 y.o. male who underwent laparoscopic cholecystectomy on 8/1/2017.  An intraoperative cholangiogram was performed and was normal.  He was admitted on postoperative day #2 for uncontrolled incisional pain. Since his discharge since from the second admission, he has done very well.  He is tolerating a regular diet.  There is no nausea.  There is no diarrhea.  There is no incisional pain.  He is engaged in his usual physical activity.    EXAM:    Alert. Oriented.  Lungs: Clear.  Heart: Regular.  Abdomen: Soft.  Nondistended.  Bowel sounds present.  There is a little erythema at the umbilical incision.  It extends about a centimeter around the incision.  There is no drainage.  There is minimal tenderness.  Extremities: Warm.    Pathology shows chronic cholecystitis and cholelithiasis.    ASSESSMENT:    Status post laparoscopic cholecystectomy with intraoperative cholangiogram on 8/1/2017  Mild erythema at the umbilical incision site    PLAN:    I'm going to give him Augmentin 875 by mouth twice a day for 5 days for the mild cellulitis at the umbilical incision.  Continue to advance diet and activity as tolerated.  He will follow up with me in the office as needed.

## 2018-11-06 ENCOUNTER — OFFICE VISIT (OUTPATIENT)
Dept: SURGERY | Facility: CLINIC | Age: 68
End: 2018-11-06

## 2018-11-06 VITALS — HEART RATE: 75 BPM | OXYGEN SATURATION: 98 % | BODY MASS INDEX: 25.94 KG/M2 | WEIGHT: 186 LBS

## 2018-11-06 DIAGNOSIS — K92.1 HEMATOCHEZIA: Primary | ICD-10-CM

## 2018-11-06 DIAGNOSIS — R19.7 DIARRHEA, UNSPECIFIED TYPE: ICD-10-CM

## 2018-11-06 DIAGNOSIS — R10.13 EPIGASTRIC PAIN: ICD-10-CM

## 2018-11-06 PROCEDURE — 99214 OFFICE O/P EST MOD 30 MIN: CPT | Performed by: SURGERY

## 2018-11-06 RX ORDER — CELECOXIB 200 MG/1
200 CAPSULE ORAL AS NEEDED
COMMUNITY
Start: 2018-10-30 | End: 2021-06-25

## 2018-11-07 NOTE — H&P (VIEW-ONLY)
CHIEF COMPLAINT:    Hematochezia, diarrhea, and epigastric pain    HISTORY OF PRESENT ILLNESS:    Feliciano Noguera is a 68 y.o. male who has been having intermittent problems with bowel habits alternating between constipation and diarrhea.  He is known to me from a prior admission to hospital for cholecystitis and cholelithiasis in August 2017.  Over the last several months, he has been suffering from diarrhea.  He has noticed blood on tissue paper after bowel movements.  The stool coloration has also darkened significantly.  The diarrhea persists, but he has not noticed blood for about a month.  There is constant rectal pressure.  It has been 9 years since his most recent colonoscopy.  There is epigastric abdominal pain radiates to the back.  It is not associated with meals.  There are no relieving factors.  The pain is a dull sensation.  He has prostatism.  He has recently been in to see Dr. Callahan to discuss recent problems with dysuria.  He was started on oral antibiotics.  He mentioned rectal pressure, diarrhea, and blood in the stool, and Dr. Callahan recommended this appointment me today.      Past Medical History:   Diagnosis Date   • Arthritis    • Gallstones    • Nausea        Past Surgical History:   Procedure Laterality Date   • CHOLECYSTECTOMY WITH INTRAOPERATIVE CHOLANGIOGRAM N/A 8/1/2017    Procedure: CHOLECYSTECTOMY LAPAROSCOPIC INTRAOPERATIVE CHOLANGIOGRAM;  Surgeon: Bola Patel MD;  Location: [facility] OR [facility] [place];  Service:    • COLONOSCOPY N/A 09/24/2009    Dr. Jamison Johnson   • ENDOSCOPY N/A 12/15/2008    Dr. Jamison Johnson   • REPLACEMENT TOTAL KNEE Left 05/08/2000    Dr. Elan Rhoeds Jr.   • ROTATOR CUFF REPAIR Right 09/29/2009    Dr. Arik Harris   • TESTICLE EXCISIONAL BIOPSY  07/21/2009    Benign, Dr. Madi Callahan         Current Outpatient Prescriptions:   •  celecoxib (CeleBREX) 200 MG capsule, Take 200 mg by mouth Daily., Disp: , Rfl:   •  naproxen sodium (ALEVE) 220 MG tablet,  Take 220 mg by mouth 2 (Two) Times a Day As Needed for Mild Pain (1-3)., Disp: , Rfl:     Allergies   Allergen Reactions   • Codeine GI Intolerance     Abdominal Pain       Family History   Problem Relation Age of Onset   • Diabetes Mother    • Tuberculosis Father    • Breast cancer Sister    • Bone cancer Sister    • Lung cancer Brother    • Malig Hyperthermia Neg Hx        Social History     Social History   • Marital status:      Spouse name: N/A   • Number of children: N/A   • Years of education: N/A     Occupational History   • Not on file.     Social History Main Topics   • Smoking status: Current Every Day Smoker     Packs/day: 0.00     Types: Cigars   • Smokeless tobacco: Never Used      Comment: 2 CIGARS PER DAY, SOMETIMES LESS   • Alcohol use Yes      Comment: beer occasionally   • Drug use: No   • Sexual activity: Defer     Other Topics Concern   • Not on file     Social History Narrative   • No narrative on file       Review of Systems   Constitutional: Positive for appetite change and fatigue.   Gastrointestinal: Positive for constipation, diarrhea, nausea and rectal pain.   Genitourinary: Positive for difficulty urinating and dysuria.   Musculoskeletal: Positive for neck pain.       Objective     Pulse 75   Wt 84.4 kg (186 lb)   SpO2 98%   BMI 25.94 kg/m²     Physical Exam   Constitutional: He is oriented to person, place, and time. He appears well-developed and well-nourished.   HENT:   Head: Normocephalic and atraumatic.   Eyes: Conjunctivae are normal. No scleral icterus.   Cardiovascular: Normal rate, regular rhythm, normal heart sounds and intact distal pulses.    No murmur heard.  Pulmonary/Chest: Effort normal and breath sounds normal. No respiratory distress. He has no wheezes. He has no rales.   Abdominal: Soft. Bowel sounds are normal. There is no tenderness. There is no guarding.   Musculoskeletal: He exhibits no edema or deformity.   Neurological: He is alert and oriented to  person, place, and time.   Skin: Skin is warm and dry.   Psychiatric: He has a normal mood and affect.   Vitals reviewed.      DIAGNOSTIC DATA:    I reviewed the pathology from his cholecystectomy showing chronic cholecystitis and cholelithiasis.     ASSESSMENT:    Epigastric pain  Rectal pressure  Diarrhea  Hematochezia  Street of arthritis and nonsteroidal anti-inflammatory drug use    PLAN:    I will schedule him for EGD and colonoscopy.  We talked about sedation used  We discussed the bowel prep  We discussed potential problems  He is interested in arranging the procedure.

## 2018-11-07 NOTE — PROGRESS NOTES
CHIEF COMPLAINT:    Hematochezia, diarrhea, and epigastric pain    HISTORY OF PRESENT ILLNESS:    Feliciano Noguera is a 68 y.o. male who has been having intermittent problems with bowel habits alternating between constipation and diarrhea.  He is known to me from a prior admission to hospital for cholecystitis and cholelithiasis in August 2017.  Over the last several months, he has been suffering from diarrhea.  He has noticed blood on tissue paper after bowel movements.  The stool coloration has also darkened significantly.  The diarrhea persists, but he has not noticed blood for about a month.  There is constant rectal pressure.  It has been 9 years since his most recent colonoscopy.  There is epigastric abdominal pain radiates to the back.  It is not associated with meals.  There are no relieving factors.  The pain is a dull sensation.  He has prostatism.  He has recently been in to see Dr. Callahan to discuss recent problems with dysuria.  He was started on oral antibiotics.  He mentioned rectal pressure, diarrhea, and blood in the stool, and Dr. Callahan recommended this appointment me today.      Past Medical History:   Diagnosis Date   • Arthritis    • Gallstones    • Nausea        Past Surgical History:   Procedure Laterality Date   • CHOLECYSTECTOMY WITH INTRAOPERATIVE CHOLANGIOGRAM N/A 8/1/2017    Procedure: CHOLECYSTECTOMY LAPAROSCOPIC INTRAOPERATIVE CHOLANGIOGRAM;  Surgeon: Bola Patel MD;  Location: [facility] OR [facility] [place];  Service:    • COLONOSCOPY N/A 09/24/2009    Dr. Jamison Johnson   • ENDOSCOPY N/A 12/15/2008    Dr. Jamison Johnson   • REPLACEMENT TOTAL KNEE Left 05/08/2000    Dr. Elan Rhodes Jr.   • ROTATOR CUFF REPAIR Right 09/29/2009    Dr. Arik Harris   • TESTICLE EXCISIONAL BIOPSY  07/21/2009    Benign, Dr. Madi Callahan         Current Outpatient Prescriptions:   •  celecoxib (CeleBREX) 200 MG capsule, Take 200 mg by mouth Daily., Disp: , Rfl:   •  naproxen sodium (ALEVE) 220 MG tablet,  Take 220 mg by mouth 2 (Two) Times a Day As Needed for Mild Pain (1-3)., Disp: , Rfl:     Allergies   Allergen Reactions   • Codeine GI Intolerance     Abdominal Pain       Family History   Problem Relation Age of Onset   • Diabetes Mother    • Tuberculosis Father    • Breast cancer Sister    • Bone cancer Sister    • Lung cancer Brother    • Malig Hyperthermia Neg Hx        Social History     Social History   • Marital status:      Spouse name: N/A   • Number of children: N/A   • Years of education: N/A     Occupational History   • Not on file.     Social History Main Topics   • Smoking status: Current Every Day Smoker     Packs/day: 0.00     Types: Cigars   • Smokeless tobacco: Never Used      Comment: 2 CIGARS PER DAY, SOMETIMES LESS   • Alcohol use Yes      Comment: beer occasionally   • Drug use: No   • Sexual activity: Defer     Other Topics Concern   • Not on file     Social History Narrative   • No narrative on file       Review of Systems   Constitutional: Positive for appetite change and fatigue.   Gastrointestinal: Positive for constipation, diarrhea, nausea and rectal pain.   Genitourinary: Positive for difficulty urinating and dysuria.   Musculoskeletal: Positive for neck pain.       Objective     Pulse 75   Wt 84.4 kg (186 lb)   SpO2 98%   BMI 25.94 kg/m²     Physical Exam   Constitutional: He is oriented to person, place, and time. He appears well-developed and well-nourished.   HENT:   Head: Normocephalic and atraumatic.   Eyes: Conjunctivae are normal. No scleral icterus.   Cardiovascular: Normal rate, regular rhythm, normal heart sounds and intact distal pulses.    No murmur heard.  Pulmonary/Chest: Effort normal and breath sounds normal. No respiratory distress. He has no wheezes. He has no rales.   Abdominal: Soft. Bowel sounds are normal. There is no tenderness. There is no guarding.   Musculoskeletal: He exhibits no edema or deformity.   Neurological: He is alert and oriented to  person, place, and time.   Skin: Skin is warm and dry.   Psychiatric: He has a normal mood and affect.   Vitals reviewed.      DIAGNOSTIC DATA:    I reviewed the pathology from his cholecystectomy showing chronic cholecystitis and cholelithiasis.     ASSESSMENT:    Epigastric pain  Rectal pressure  Diarrhea  Hematochezia  Street of arthritis and nonsteroidal anti-inflammatory drug use    PLAN:    I will schedule him for EGD and colonoscopy.  We talked about sedation used  We discussed the bowel prep  We discussed potential problems  He is interested in arranging the procedure.

## 2018-11-14 ENCOUNTER — HOSPITAL ENCOUNTER (OUTPATIENT)
Facility: HOSPITAL | Age: 68
Setting detail: HOSPITAL OUTPATIENT SURGERY
Discharge: HOME | End: 2018-11-14
Attending: SURGERY | Admitting: SURGERY

## 2018-11-14 ENCOUNTER — ANESTHESIA (OUTPATIENT)
Dept: GASTROENTEROLOGY | Facility: HOSPITAL | Age: 68
End: 2018-11-14

## 2018-11-14 ENCOUNTER — ANESTHESIA EVENT (OUTPATIENT)
Dept: GASTROENTEROLOGY | Facility: HOSPITAL | Age: 68
End: 2018-11-14

## 2018-11-14 VITALS
RESPIRATION RATE: 16 BRPM | DIASTOLIC BLOOD PRESSURE: 86 MMHG | BODY MASS INDEX: 25.37 KG/M2 | WEIGHT: 181.2 LBS | HEIGHT: 71 IN | HEART RATE: 73 BPM | SYSTOLIC BLOOD PRESSURE: 132 MMHG | TEMPERATURE: 97.5 F | OXYGEN SATURATION: 97 %

## 2018-11-14 DIAGNOSIS — K92.1 HEMATOCHEZIA: ICD-10-CM

## 2018-11-14 DIAGNOSIS — R19.7 DIARRHEA, UNSPECIFIED TYPE: ICD-10-CM

## 2018-11-14 DIAGNOSIS — R10.13 EPIGASTRIC PAIN: ICD-10-CM

## 2018-11-14 PROCEDURE — 45380 COLONOSCOPY AND BIOPSY: CPT | Performed by: SURGERY

## 2018-11-14 PROCEDURE — 88305 TISSUE EXAM BY PATHOLOGIST: CPT | Performed by: SURGERY

## 2018-11-14 PROCEDURE — 43239 EGD BIOPSY SINGLE/MULTIPLE: CPT | Performed by: SURGERY

## 2018-11-14 PROCEDURE — 25010000002 PROPOFOL 10 MG/ML EMULSION: Performed by: ANESTHESIOLOGY

## 2018-11-14 RX ORDER — SODIUM CHLORIDE 0.9 % (FLUSH) 0.9 %
3 SYRINGE (ML) INJECTION AS NEEDED
Status: DISCONTINUED | OUTPATIENT
Start: 2018-11-14 | End: 2018-11-14 | Stop reason: HOSPADM

## 2018-11-14 RX ORDER — PROPOFOL 10 MG/ML
VIAL (ML) INTRAVENOUS CONTINUOUS PRN
Status: DISCONTINUED | OUTPATIENT
Start: 2018-11-14 | End: 2018-11-14 | Stop reason: SURG

## 2018-11-14 RX ORDER — PROPOFOL 10 MG/ML
VIAL (ML) INTRAVENOUS AS NEEDED
Status: DISCONTINUED | OUTPATIENT
Start: 2018-11-14 | End: 2018-11-14 | Stop reason: SURG

## 2018-11-14 RX ORDER — GLYCOPYRROLATE 0.2 MG/ML
INJECTION INTRAMUSCULAR; INTRAVENOUS AS NEEDED
Status: DISCONTINUED | OUTPATIENT
Start: 2018-11-14 | End: 2018-11-14 | Stop reason: SURG

## 2018-11-14 RX ORDER — LIDOCAINE HYDROCHLORIDE 10 MG/ML
0.5 INJECTION, SOLUTION INFILTRATION; PERINEURAL ONCE AS NEEDED
Status: DISCONTINUED | OUTPATIENT
Start: 2018-11-14 | End: 2018-11-14 | Stop reason: HOSPADM

## 2018-11-14 RX ORDER — LIDOCAINE HYDROCHLORIDE 20 MG/ML
INJECTION, SOLUTION INFILTRATION; PERINEURAL AS NEEDED
Status: DISCONTINUED | OUTPATIENT
Start: 2018-11-14 | End: 2018-11-14 | Stop reason: SURG

## 2018-11-14 RX ORDER — SODIUM CHLORIDE, SODIUM LACTATE, POTASSIUM CHLORIDE, CALCIUM CHLORIDE 600; 310; 30; 20 MG/100ML; MG/100ML; MG/100ML; MG/100ML
1000 INJECTION, SOLUTION INTRAVENOUS CONTINUOUS
Status: DISCONTINUED | OUTPATIENT
Start: 2018-11-14 | End: 2018-11-14 | Stop reason: HOSPADM

## 2018-11-14 RX ADMIN — PROPOFOL 100 MCG/KG/MIN: 10 INJECTION, EMULSION INTRAVENOUS at 10:50

## 2018-11-14 RX ADMIN — LIDOCAINE HYDROCHLORIDE 60 MG: 20 INJECTION, SOLUTION INFILTRATION; PERINEURAL at 10:53

## 2018-11-14 RX ADMIN — GLYCOPYRROLATE 0.2 MCG: 0.2 INJECTION INTRAMUSCULAR; INTRAVENOUS at 10:51

## 2018-11-14 RX ADMIN — SODIUM CHLORIDE, POTASSIUM CHLORIDE, SODIUM LACTATE AND CALCIUM CHLORIDE 1000 ML: 600; 310; 30; 20 INJECTION, SOLUTION INTRAVENOUS at 10:13

## 2018-11-14 RX ADMIN — PROPOFOL 50 MG: 10 INJECTION, EMULSION INTRAVENOUS at 10:50

## 2018-11-14 NOTE — ANESTHESIA PREPROCEDURE EVALUATION
Anesthesia Evaluation     Patient summary reviewed and Nursing notes reviewed                Airway   Mallampati: I  TM distance: >3 FB  Neck ROM: full  No difficulty expected  Dental - normal exam     Pulmonary - normal exam   (+) a smoker Current,   Cardiovascular - negative cardio ROS and normal exam        Neuro/Psych- negative ROS  GI/Hepatic/Renal/Endo - negative ROS     Musculoskeletal     Abdominal  - normal exam    Bowel sounds: normal.   Substance History - negative use     OB/GYN negative ob/gyn ROS         Other   (+) arthritis                     Anesthesia Plan    ASA 2     MAC     Anesthetic plan, all risks, benefits, and alternatives have been provided, discussed and informed consent has been obtained with: patient.

## 2018-11-14 NOTE — OP NOTE
Preoperative diagnosis:   Epigastric abdominal pain.  Hematochezia.  Alternating bowel habits.  Postoperative diagnosis:   Hiatal hernia.  Distal esophagitis.  Mild gastritis.  Sigmoid diverticulosis  Procedure:   EGD with biopsy.  Colonoscopy with biopsy.  Attending surgeon: Bola Patel M.D.  Anesthesia: MAC  Specimens:   Distal esophagus.  Gastric antrum.  Duodenum.    Random biopsy of the descending colon  Blood loss: 5 mL.  Drains: None.  Bowel prep: Excellent.  Scope withdrawal time: 8:40.    Indications: Patient is a 68 y.o. male who presented recently to the office with abdominal pain in the epigastrium, hematochezia, and change in bowel habits with diarrhea alternating with constipation.  He is in the endoscopy suite today for evaluation with EGD and colonoscopy.    Description of procedure: He is taken to the endoscopy suite and positioned on the stretcher in the left lateral decubitus position. A surgical pause was performed. After graduated amounts of propofol were administered, the flexible endoscope was inserted into the mouth through a bite-block.    The esophagus was examined upon advancement of the scope. It was mildly dilated.  It was mildly tortuous.. There was a 6 cm hiatal hernia. There was an abnormal Z line at 35 cm.  There were mucosal changes that had the appearance of Oconnor's esophagus. The hiatus occurred at 41 cm from the incisors. It was easily traversed.     The stomach was entered. A retroflexed view confirmed that there was a hiatal hernia. There was diffuse mild gastritis characterized by mucosal friability and erythema. A biopsy of the antrum was obtained. The pylorus was traversed. The bulb, second and third portions were normal.  A biopsy of the second portion of duodenum was obtained.  The endoscope was withdrawn into the stomach. The biopsy site was hemostatic. The insufflated air was evacuated. The scope was completely withdrawn.    A digital rectal exam was  performed. It was normal.     The flexible colonoscope was inserted into the anus and advanced to the level of the cecum without difficulty. The ileocecal valve was identified. The appendiceal orifice was identified and photographed.  It appeared normal.  The intraluminal surface of the colon was examined upon withdrawal scope.    The bowel prep was excellent. There was liquid within all segments of the colon that was easily evacuated with the suction channel the scope. There were wide mouthed diverticula most numerous within the sigmoid region. There were no polyps. There were no vascular malformations. There were no hemorrhoids. A random biopsy was obtained from the descending colon with the cold biopsy forceps.  Hemostasis was spontaneous and occurred within a few seconds.    He tolerated the procedure very well, and is returned to the recovery area in stable condition.

## 2018-11-14 NOTE — ANESTHESIA POSTPROCEDURE EVALUATION
"Patient: Feliciano Noguera    Procedure Summary     Date:  11/14/18 Room / Location:  [facility] ENDOSCOPY 10 / [facility] ENDOSCOPY    Anesthesia Start:  1048 Anesthesia Stop:  1118    Procedures:       COLONOSCOPY into cecum with biopsy (N/A )      ESOPHAGOGASTRODUODENOSCOPY with biopsy (N/A Esophagus) Diagnosis:       Hematochezia      Epigastric pain      Diarrhea, unspecified type      (Hematochezia [K92.1])      (Epigastric pain [R10.13])      (Diarrhea, unspecified type [R19.7])    Surgeon:  Bola Patel MD Provider:  Mario Juarez MD    Anesthesia Type:  MAC ASA Status:  2          Anesthesia Type: MAC  Last vitals  BP   123/95 (11/14/18 1156)   Temp   36.4 °C (97.5 °F) (11/14/18 1156)   Pulse   75 (11/14/18 1156)   Resp   17 (11/14/18 1156)     SpO2   97 % (11/14/18 1156)     Post Anesthesia Care and Evaluation    Patient location during evaluation: PACU  Patient participation: complete - patient participated  Level of consciousness: awake  Pain score: 0  Pain management: adequate  Airway patency: patent  Anesthetic complications: No anesthetic complications  PONV Status: none  Cardiovascular status: acceptable  Respiratory status: acceptable  Hydration status: acceptable    Comments: /95 (BP Location: Left arm, Patient Position: Lying)   Pulse 75   Temp 36.4 °C (97.5 °F) (Oral)   Resp 17   Ht 180.3 cm (71\")   Wt 82.2 kg (181 lb 3.2 oz)   SpO2 97%   BMI 25.27 kg/m²       "

## 2018-11-15 LAB
CYTO UR: NORMAL
LAB AP CASE REPORT: NORMAL
PATH REPORT.FINAL DX SPEC: NORMAL
PATH REPORT.GROSS SPEC: NORMAL

## 2018-11-27 ENCOUNTER — TELEPHONE (OUTPATIENT)
Dept: SURGERY | Facility: CLINIC | Age: 68
End: 2018-11-27

## 2019-05-24 ENCOUNTER — APPOINTMENT (OUTPATIENT)
Dept: PREADMISSION TESTING | Facility: HOSPITAL | Age: 69
End: 2019-05-24

## 2019-05-24 ENCOUNTER — HOSPITAL ENCOUNTER (OUTPATIENT)
Dept: GENERAL RADIOLOGY | Facility: HOSPITAL | Age: 69
Discharge: HOME | End: 2019-05-24
Admitting: ORTHOPAEDIC SURGERY

## 2019-05-24 ENCOUNTER — HOSPITAL ENCOUNTER (OUTPATIENT)
Dept: GENERAL RADIOLOGY | Facility: HOSPITAL | Age: 69
Discharge: HOME | End: 2019-05-24

## 2019-05-24 VITALS
WEIGHT: 182 LBS | HEART RATE: 67 BPM | BODY MASS INDEX: 25.48 KG/M2 | SYSTOLIC BLOOD PRESSURE: 130 MMHG | DIASTOLIC BLOOD PRESSURE: 77 MMHG | TEMPERATURE: 97.2 F | OXYGEN SATURATION: 99 % | RESPIRATION RATE: 18 BRPM | HEIGHT: 71 IN

## 2019-05-24 LAB
ALBUMIN SERPL-MCNC: 4.1 G/DL (ref 3.5–5.2)
ALBUMIN/GLOB SERPL: 1.7 G/DL
ALP SERPL-CCNC: 85 U/L (ref 39–117)
ALT SERPL W P-5'-P-CCNC: 14 U/L (ref 1–41)
ANION GAP SERPL CALCULATED.3IONS-SCNC: 10.2 MMOL/L
AST SERPL-CCNC: 13 U/L (ref 1–40)
BACTERIA UR QL AUTO: NORMAL /HPF
BILIRUB SERPL-MCNC: 0.3 MG/DL (ref 0.2–1.2)
BILIRUB UR QL STRIP: NEGATIVE
BUN BLD-MCNC: 13 MG/DL (ref 8–23)
BUN/CREAT SERPL: 14 (ref 7–25)
CALCIUM SPEC-SCNC: 9.1 MG/DL (ref 8.6–10.5)
CHLORIDE SERPL-SCNC: 98 MMOL/L (ref 98–107)
CLARITY UR: CLEAR
CO2 SERPL-SCNC: 28.8 MMOL/L (ref 22–29)
COLOR UR: YELLOW
CREAT BLD-MCNC: 0.93 MG/DL (ref 0.76–1.27)
DEPRECATED RDW RBC AUTO: 40.9 FL (ref 37–54)
ERYTHROCYTE [DISTWIDTH] IN BLOOD BY AUTOMATED COUNT: 12.8 % (ref 12.3–15.4)
GFR SERPL CREATININE-BSD FRML MDRD: 80 ML/MIN/1.73
GLOBULIN UR ELPH-MCNC: 2.4 GM/DL
GLUCOSE BLD-MCNC: 123 MG/DL (ref 65–99)
GLUCOSE UR STRIP-MCNC: NEGATIVE MG/DL
HCT VFR BLD AUTO: 43.6 % (ref 37.5–51)
HGB BLD-MCNC: 13.9 G/DL (ref 13–17.7)
HGB UR QL STRIP.AUTO: NEGATIVE
HYALINE CASTS UR QL AUTO: NORMAL /LPF
INR PPP: 1.03 (ref 0.9–1.1)
KETONES UR QL STRIP: NEGATIVE
LEUKOCYTE ESTERASE UR QL STRIP.AUTO: NEGATIVE
MCH RBC QN AUTO: 27.9 PG (ref 26.6–33)
MCHC RBC AUTO-ENTMCNC: 31.9 G/DL (ref 31.5–35.7)
MCV RBC AUTO: 87.4 FL (ref 79–97)
NITRITE UR QL STRIP: NEGATIVE
PH UR STRIP.AUTO: 7 [PH] (ref 5–8)
PLATELET # BLD AUTO: 209 10*3/MM3 (ref 140–450)
PMV BLD AUTO: 10.5 FL (ref 6–12)
POTASSIUM BLD-SCNC: 4 MMOL/L (ref 3.5–5.2)
PROT SERPL-MCNC: 6.5 G/DL (ref 6–8.5)
PROT UR QL STRIP: NEGATIVE
PROTHROMBIN TIME: 13.2 SECONDS (ref 11.7–14.2)
RBC # BLD AUTO: 4.99 10*6/MM3 (ref 4.14–5.8)
RBC # UR: NORMAL /HPF
REF LAB TEST METHOD: NORMAL
SODIUM BLD-SCNC: 137 MMOL/L (ref 136–145)
SP GR UR STRIP: 1.02 (ref 1–1.03)
SQUAMOUS #/AREA URNS HPF: NORMAL /HPF
UROBILINOGEN UR QL STRIP: NORMAL
WBC NRBC COR # BLD: 6.56 10*3/MM3 (ref 3.4–10.8)
WBC UR QL AUTO: NORMAL /HPF

## 2019-05-24 PROCEDURE — 80053 COMPREHEN METABOLIC PANEL: CPT | Performed by: ORTHOPAEDIC SURGERY

## 2019-05-24 PROCEDURE — 85027 COMPLETE CBC AUTOMATED: CPT | Performed by: ORTHOPAEDIC SURGERY

## 2019-05-24 PROCEDURE — 81001 URINALYSIS AUTO W/SCOPE: CPT | Performed by: ORTHOPAEDIC SURGERY

## 2019-05-24 PROCEDURE — 93005 ELECTROCARDIOGRAM TRACING: CPT

## 2019-05-24 PROCEDURE — 36415 COLL VENOUS BLD VENIPUNCTURE: CPT

## 2019-05-24 PROCEDURE — 73560 X-RAY EXAM OF KNEE 1 OR 2: CPT

## 2019-05-24 PROCEDURE — 93010 ELECTROCARDIOGRAM REPORT: CPT | Performed by: INTERNAL MEDICINE

## 2019-05-24 PROCEDURE — 85610 PROTHROMBIN TIME: CPT | Performed by: ORTHOPAEDIC SURGERY

## 2019-05-24 PROCEDURE — 71046 X-RAY EXAM CHEST 2 VIEWS: CPT

## 2019-05-24 RX ORDER — TAMSULOSIN HYDROCHLORIDE 0.4 MG/1
1 CAPSULE ORAL NIGHTLY
COMMUNITY

## 2019-05-24 RX ORDER — TRAMADOL HYDROCHLORIDE 50 MG/1
50 TABLET ORAL EVERY 6 HOURS PRN
COMMUNITY
End: 2019-10-11

## 2019-05-24 RX ORDER — CHLORHEXIDINE GLUCONATE 500 MG/1
CLOTH TOPICAL TAKE AS DIRECTED
COMMUNITY
End: 2019-05-31 | Stop reason: HOSPADM

## 2019-05-24 ASSESSMENT — KOOS JR
KOOS JR SCORE: 15
KOOS JR SCORE: 50.012

## 2019-05-24 NOTE — DISCHARGE INSTRUCTIONS
Take the following medications the morning of surgery with a small sip of water: ZEGERID    ARRIVE AT 9AM    General Instructions:  • Do not eat solid food after midnight the night before surgery.  • You may drink clear liquids day of surgery but must stop at least one hour before your hospital arrival time.  • It is beneficial for you to have a clear drink that contains carbohydrates the day of surgery.  We suggest a 12 to 20 ounce bottle of Gatorade or Powerade for non-diabetic patients or a 12 to 20 ounce bottle of G2 or Powerade Zero for diabetic patients. (Pediatric patients, are not advised to drink a 12 to 20 ounce carbohydrate drink)    Clear liquids are liquids you can see through.  Nothing red in color.     Plain water                               Sports drinks  Sodas                                   Gelatin (Jell-O)  Fruit juices without pulp such as white grape juice and apple juice  Popsicles that contain no fruit or yogurt  Tea or coffee (no cream or milk added)  Gatorade / Powerade  G2 / Powerade Zero    • Infants may have breast milk up to four hours before surgery.  • Infants drinking formula may drink formula up to six hours before surgery.   • Patients who avoid smoking, chewing tobacco and alcohol for 4 weeks prior to surgery have a reduced risk of post-operative complications.  Quit smoking as many days before surgery as you can.  • Do not smoke, use chewing tobacco or drink alcohol the day of surgery.   • If applicable bring your C-PAP/ BI-PAP machine.  • Bring any papers given to you in the doctor’s office.  • Wear clean comfortable clothes and socks.  • Do not wear contact lenses, false eyelashes or make-up.  Bring a case for your glasses.   • Bring crutches or walker if applicable.  • Remove all piercings.  Leave jewelry and any other valuables at home.  • Hair extensions with metal clips must be removed prior to surgery.  • The Pre-Admission Testing nurse will instruct you to bring  medications if unable to obtain an accurate list in Pre-Admission Testing.            Preventing a Surgical Site Infection:  • For 2 to 3 days before surgery, avoid shaving with a razor because the razor can irritate skin and make it easier to develop an infection.    • Any areas of open skin can increase the risk of a post-operative wound infection by allowing bacteria to enter and travel throughout the body.  Notify your surgeon if you have any skin wounds / rashes even if it is not near the expected surgical site.  The area will need assessed to determine if surgery should be delayed until it is healed.  • The night prior to surgery sleep in a clean bed with clean clothing.  Do not allow pets to sleep with you.  • Shower on the morning of surgery using a fresh bar of anti-bacterial soap (such as Dial) and clean washcloth.  Dry with a clean towel and dress in clean clothing.  • Ask your surgeon if you will be receiving antibiotics prior to surgery.  • Make sure you, your family, and all healthcare providers clean their hands with soap and water or an alcohol based hand  before caring for you or your wound.    Day of surgery:  Upon arrival, a Pre-op nurse and Anesthesiologist will review your health history, obtain vital signs, and answer questions you may have.  The only belongings needed at this time will be a list of your home medications and if applicable your C-PAP/BI-PAP machine.  If you are staying overnight your family can leave the rest of your belongings in the car and bring them to your room later.  A Pre-op nurse will start an IV and you may receive medication in preparation for surgery, including something to help you relax.  Your family will be able to see you in the Pre-op area.  While you are in surgery your family should notify the waiting room  if they leave the waiting room area and provide a contact phone number.    Please be aware that surgery does come with discomfort.  We  want to make every effort to control your discomfort so please discuss any uncontrolled symptoms with your nurse.   Your doctor will most likely have prescribed pain medications.      If you are going home after surgery you will receive individualized written care instructions before being discharged.  A responsible adult must drive you to and from the hospital on the day of your surgery and stay with you for 24 hours.    If you are staying overnight following surgery, you will be transported to your hospital room following the recovery period.  [facility] has all private rooms.    You have received a list of surgical assistants for your reference.  If you have any questions please call Pre-Admission Testing at [phone].  Deductibles and co-payments are collected on the day of service. Please be prepared to pay the required co-pay, deductible or deposit on the day of service as defined by your plan.    2% CHLORAHEXIDINE GLUCONATE* CLOTH  Preparing or “prepping” skin before surgery can reduce the risk of infection at the surgical site. To make the process easier, [facility] has chosen disposable cloths moistened with a rinse-free, 2% Chlorhexidine Gluconate (CHG) antiseptic solution. The steps below outline the prepping process and should be carefully followed.        Use the prep cloth on the area that is circled in the diagram             Directions Night before Surgery  1) Shower using a fresh bar of anti-bacterial soap (such as Dial) and clean washcloth.  Use a clean towel to completely dry your skin.  2) Do not use any lotions, oils or creams on your skin.  3) Open the package and remove 1 cloth, wipe your skin for 30 seconds in a circular motion.  Allow to dry for 3 minutes.  4) Repeat #3 with second cloth.  5) Do not touch your eyes, ears, or mouth with the prep cloth.  6) Allow the wet prep solution to air dry.  7) Discard the prep cloth and wash your hands with soap and water.    8) Dress in clean bed clothes and sleep on fresh clean bed sheets.   9) You may experience some temporary itching after the prep.    Directions Day of Surgery  1) Repeat steps 1,2,3,4,5,6,7, and 9.   2) Dress in clean clothes before coming to the hospital.    BACTROBAN NASAL OINTMENT  There are many germs normally in your nose. Bactroban is an ointment that will help reduce these germs. Please follow these instructions for Bactroban use:      ____The day before surgery in the morning  Date________    ____The day before surgery in the evening              Date________    ____The day of surgery in the morning    Date________    **Squirt ½ package of Bactroban Ointment onto a cotton applicator and apply to inside of 1st nostril.  Squirt the remaining Bactroban and apply to the inside of the other nostril.

## 2019-05-28 ENCOUNTER — HOSPITAL ENCOUNTER (INPATIENT)
Facility: HOSPITAL | Age: 69
LOS: 3 days | Discharge: HOME | End: 2019-05-31
Attending: ORTHOPAEDIC SURGERY | Admitting: ORTHOPAEDIC SURGERY

## 2019-05-28 ENCOUNTER — ANESTHESIA EVENT (OUTPATIENT)
Dept: PERIOP | Facility: HOSPITAL | Age: 69
End: 2019-05-28

## 2019-05-28 ENCOUNTER — ANESTHESIA (OUTPATIENT)
Dept: PERIOP | Facility: HOSPITAL | Age: 69
End: 2019-05-28

## 2019-05-28 ENCOUNTER — APPOINTMENT (OUTPATIENT)
Dept: GENERAL RADIOLOGY | Facility: HOSPITAL | Age: 69
End: 2019-05-28

## 2019-05-28 DIAGNOSIS — R26.2 DIFFICULTY WALKING: Primary | ICD-10-CM

## 2019-05-28 PROBLEM — M17.9 OA (OSTEOARTHRITIS) OF KNEE: Status: ACTIVE | Noted: 2019-05-28

## 2019-05-28 PROCEDURE — 97162 PT EVAL MOD COMPLEX 30 MIN: CPT

## 2019-05-28 PROCEDURE — 25010000002 FENTANYL CITRATE (PF) 100 MCG/2ML SOLUTION: Performed by: NURSE ANESTHETIST, CERTIFIED REGISTERED

## 2019-05-28 PROCEDURE — 25010000002 ONDANSETRON PER 1 MG: Performed by: NURSE ANESTHETIST, CERTIFIED REGISTERED

## 2019-05-28 PROCEDURE — 97110 THERAPEUTIC EXERCISES: CPT

## 2019-05-28 PROCEDURE — C1713 ANCHOR/SCREW BN/BN,TIS/BN: HCPCS | Performed by: ORTHOPAEDIC SURGERY

## 2019-05-28 PROCEDURE — 25010000002 PROPOFOL 10 MG/ML EMULSION: Performed by: NURSE ANESTHETIST, CERTIFIED REGISTERED

## 2019-05-28 PROCEDURE — 25010000002 FENTANYL CITRATE (PF) 100 MCG/2ML SOLUTION: Performed by: ANESTHESIOLOGY

## 2019-05-28 PROCEDURE — 25010000002 KETOROLAC TROMETHAMINE PER 15 MG: Performed by: NURSE ANESTHETIST, CERTIFIED REGISTERED

## 2019-05-28 PROCEDURE — 25010000002 DEXAMETHASONE PER 1 MG: Performed by: NURSE ANESTHETIST, CERTIFIED REGISTERED

## 2019-05-28 PROCEDURE — C1776 JOINT DEVICE (IMPLANTABLE): HCPCS | Performed by: ORTHOPAEDIC SURGERY

## 2019-05-28 PROCEDURE — 25010000002 ONDANSETRON PER 1 MG: Performed by: ORTHOPAEDIC SURGERY

## 2019-05-28 PROCEDURE — 25010000002 ROPIVACAINE PER 1 MG: Performed by: ORTHOPAEDIC SURGERY

## 2019-05-28 PROCEDURE — 25010000002 HYDROMORPHONE PER 4 MG: Performed by: NURSE ANESTHETIST, CERTIFIED REGISTERED

## 2019-05-28 PROCEDURE — 25010000002 SUCCINYLCHOLINE PER 20 MG: Performed by: NURSE ANESTHETIST, CERTIFIED REGISTERED

## 2019-05-28 PROCEDURE — 25010000003 CEFAZOLIN IN DEXTROSE 2-4 GM/100ML-% SOLUTION: Performed by: ORTHOPAEDIC SURGERY

## 2019-05-28 PROCEDURE — 25010000002 MIDAZOLAM PER 1 MG: Performed by: ANESTHESIOLOGY

## 2019-05-28 PROCEDURE — 0SRC0J9 REPLACEMENT OF RIGHT KNEE JOINT WITH SYNTHETIC SUBSTITUTE, CEMENTED, OPEN APPROACH: ICD-10-PCS | Performed by: ORTHOPAEDIC SURGERY

## 2019-05-28 PROCEDURE — 73560 X-RAY EXAM OF KNEE 1 OR 2: CPT

## 2019-05-28 PROCEDURE — 25010000002 DEXAMETHASONE PER 1 MG: Performed by: ANESTHESIOLOGY

## 2019-05-28 PROCEDURE — 25010000002 KETOROLAC TROMETHAMINE PER 15 MG: Performed by: ORTHOPAEDIC SURGERY

## 2019-05-28 DEVICE — CAP TOTL KN CMT PREMIUM: Type: IMPLANTABLE DEVICE | Site: KNEE | Status: FUNCTIONAL

## 2019-05-28 DEVICE — TRY TIB INTERLOK PRI 83MM: Type: IMPLANTABLE DEVICE | Site: KNEE | Status: FUNCTIONAL

## 2019-05-28 DEVICE — PAT 3PEG THN 37X9.6 37MM: Type: IMPLANTABLE DEVICE | Site: KNEE | Status: FUNCTIONAL

## 2019-05-28 DEVICE — CMT BONE R 1X40: Type: IMPLANTABLE DEVICE | Site: KNEE | Status: FUNCTIONAL

## 2019-05-28 DEVICE — COMP FEM/KN VANGUARD INTLK CR 70MM NS RT: Type: IMPLANTABLE DEVICE | Site: KNEE | Status: FUNCTIONAL

## 2019-05-28 DEVICE — IMPLANTABLE DEVICE: Type: IMPLANTABLE DEVICE | Site: KNEE | Status: FUNCTIONAL

## 2019-05-28 DEVICE — STEM TIB PRI FINN 46X40MM: Type: IMPLANTABLE DEVICE | Site: KNEE | Status: FUNCTIONAL

## 2019-05-28 RX ORDER — NALOXONE HCL 0.4 MG/ML
0.2 VIAL (ML) INJECTION AS NEEDED
Status: DISCONTINUED | OUTPATIENT
Start: 2019-05-28 | End: 2019-05-28 | Stop reason: HOSPADM

## 2019-05-28 RX ORDER — ROCURONIUM BROMIDE 10 MG/ML
INJECTION, SOLUTION INTRAVENOUS AS NEEDED
Status: DISCONTINUED | OUTPATIENT
Start: 2019-05-28 | End: 2019-05-28 | Stop reason: SURG

## 2019-05-28 RX ORDER — HYDRALAZINE HYDROCHLORIDE 20 MG/ML
5 INJECTION INTRAMUSCULAR; INTRAVENOUS
Status: DISCONTINUED | OUTPATIENT
Start: 2019-05-28 | End: 2019-05-28 | Stop reason: HOSPADM

## 2019-05-28 RX ORDER — OXYCODONE HYDROCHLORIDE AND ACETAMINOPHEN 5; 325 MG/1; MG/1
2 TABLET ORAL EVERY 4 HOURS PRN
Status: DISCONTINUED | OUTPATIENT
Start: 2019-05-28 | End: 2019-05-30

## 2019-05-28 RX ORDER — DIPHENHYDRAMINE HCL 25 MG
25 CAPSULE ORAL
Status: DISCONTINUED | OUTPATIENT
Start: 2019-05-28 | End: 2019-05-28 | Stop reason: HOSPADM

## 2019-05-28 RX ORDER — CHOLECALCIFEROL (VITAMIN D3) 125 MCG
5 CAPSULE ORAL NIGHTLY PRN
Status: DISCONTINUED | OUTPATIENT
Start: 2019-05-28 | End: 2019-05-31 | Stop reason: HOSPADM

## 2019-05-28 RX ORDER — ONDANSETRON 2 MG/ML
4 INJECTION INTRAMUSCULAR; INTRAVENOUS ONCE AS NEEDED
Status: DISCONTINUED | OUTPATIENT
Start: 2019-05-28 | End: 2019-05-28 | Stop reason: HOSPADM

## 2019-05-28 RX ORDER — PROMETHAZINE HYDROCHLORIDE 25 MG/ML
12.5 INJECTION, SOLUTION INTRAMUSCULAR; INTRAVENOUS ONCE AS NEEDED
Status: DISCONTINUED | OUTPATIENT
Start: 2019-05-28 | End: 2019-05-28 | Stop reason: HOSPADM

## 2019-05-28 RX ORDER — OXYCODONE HYDROCHLORIDE AND ACETAMINOPHEN 5; 325 MG/1; MG/1
1 TABLET ORAL EVERY 4 HOURS PRN
Status: DISCONTINUED | OUTPATIENT
Start: 2019-05-28 | End: 2019-05-30

## 2019-05-28 RX ORDER — SODIUM CHLORIDE 0.9 % (FLUSH) 0.9 %
1-10 SYRINGE (ML) INJECTION AS NEEDED
Status: DISCONTINUED | OUTPATIENT
Start: 2019-05-28 | End: 2019-05-28 | Stop reason: HOSPADM

## 2019-05-28 RX ORDER — CEFAZOLIN SODIUM 2 G/100ML
2 INJECTION, SOLUTION INTRAVENOUS ONCE
Status: COMPLETED | OUTPATIENT
Start: 2019-05-28 | End: 2019-05-28

## 2019-05-28 RX ORDER — DOCUSATE SODIUM 100 MG/1
100 CAPSULE, LIQUID FILLED ORAL 2 TIMES DAILY PRN
Status: DISCONTINUED | OUTPATIENT
Start: 2019-05-28 | End: 2019-05-31 | Stop reason: HOSPADM

## 2019-05-28 RX ORDER — FAMOTIDINE 10 MG/ML
20 INJECTION, SOLUTION INTRAVENOUS ONCE
Status: COMPLETED | OUTPATIENT
Start: 2019-05-28 | End: 2019-05-28

## 2019-05-28 RX ORDER — ONDANSETRON 2 MG/ML
4 INJECTION INTRAMUSCULAR; INTRAVENOUS EVERY 6 HOURS PRN
Status: DISCONTINUED | OUTPATIENT
Start: 2019-05-28 | End: 2019-05-31 | Stop reason: HOSPADM

## 2019-05-28 RX ORDER — FENTANYL CITRATE 50 UG/ML
INJECTION, SOLUTION INTRAMUSCULAR; INTRAVENOUS AS NEEDED
Status: DISCONTINUED | OUTPATIENT
Start: 2019-05-28 | End: 2019-05-28 | Stop reason: SURG

## 2019-05-28 RX ORDER — CLINDAMYCIN PHOSPHATE 900 MG/50ML
900 INJECTION INTRAVENOUS EVERY 8 HOURS
Status: COMPLETED | OUTPATIENT
Start: 2019-05-28 | End: 2019-05-29

## 2019-05-28 RX ORDER — PROMETHAZINE HYDROCHLORIDE 25 MG/ML
6.25 INJECTION, SOLUTION INTRAMUSCULAR; INTRAVENOUS
Status: DISCONTINUED | OUTPATIENT
Start: 2019-05-28 | End: 2019-05-28 | Stop reason: HOSPADM

## 2019-05-28 RX ORDER — ACETAMINOPHEN 325 MG/1
650 TABLET ORAL ONCE AS NEEDED
Status: DISCONTINUED | OUTPATIENT
Start: 2019-05-28 | End: 2019-05-28 | Stop reason: HOSPADM

## 2019-05-28 RX ORDER — KETOROLAC TROMETHAMINE 30 MG/ML
15 INJECTION, SOLUTION INTRAMUSCULAR; INTRAVENOUS EVERY 8 HOURS PRN
Status: DISCONTINUED | OUTPATIENT
Start: 2019-05-28 | End: 2019-05-31 | Stop reason: HOSPADM

## 2019-05-28 RX ORDER — LIDOCAINE HYDROCHLORIDE 10 MG/ML
0.5 INJECTION, SOLUTION EPIDURAL; INFILTRATION; INTRACAUDAL; PERINEURAL ONCE AS NEEDED
Status: DISCONTINUED | OUTPATIENT
Start: 2019-05-28 | End: 2019-05-28 | Stop reason: HOSPADM

## 2019-05-28 RX ORDER — LIDOCAINE HYDROCHLORIDE 20 MG/ML
INJECTION, SOLUTION INFILTRATION; PERINEURAL AS NEEDED
Status: DISCONTINUED | OUTPATIENT
Start: 2019-05-28 | End: 2019-05-28 | Stop reason: SURG

## 2019-05-28 RX ORDER — DIPHENHYDRAMINE HYDROCHLORIDE 50 MG/ML
12.5 INJECTION INTRAMUSCULAR; INTRAVENOUS
Status: DISCONTINUED | OUTPATIENT
Start: 2019-05-28 | End: 2019-05-28 | Stop reason: HOSPADM

## 2019-05-28 RX ORDER — IPRATROPIUM BROMIDE AND ALBUTEROL SULFATE 2.5; .5 MG/3ML; MG/3ML
3 SOLUTION RESPIRATORY (INHALATION) ONCE AS NEEDED
Status: DISCONTINUED | OUTPATIENT
Start: 2019-05-28 | End: 2019-05-28 | Stop reason: HOSPADM

## 2019-05-28 RX ORDER — PROMETHAZINE HYDROCHLORIDE 25 MG/1
25 SUPPOSITORY RECTAL ONCE AS NEEDED
Status: DISCONTINUED | OUTPATIENT
Start: 2019-05-28 | End: 2019-05-28 | Stop reason: HOSPADM

## 2019-05-28 RX ORDER — FLUMAZENIL 0.1 MG/ML
0.2 INJECTION INTRAVENOUS AS NEEDED
Status: DISCONTINUED | OUTPATIENT
Start: 2019-05-28 | End: 2019-05-28 | Stop reason: HOSPADM

## 2019-05-28 RX ORDER — SODIUM CHLORIDE, SODIUM LACTATE, POTASSIUM CHLORIDE, CALCIUM CHLORIDE 600; 310; 30; 20 MG/100ML; MG/100ML; MG/100ML; MG/100ML
100 INJECTION, SOLUTION INTRAVENOUS CONTINUOUS
Status: DISCONTINUED | OUTPATIENT
Start: 2019-05-28 | End: 2019-05-31 | Stop reason: HOSPADM

## 2019-05-28 RX ORDER — ONDANSETRON 2 MG/ML
INJECTION INTRAMUSCULAR; INTRAVENOUS AS NEEDED
Status: DISCONTINUED | OUTPATIENT
Start: 2019-05-28 | End: 2019-05-28 | Stop reason: SURG

## 2019-05-28 RX ORDER — SUCCINYLCHOLINE CHLORIDE 20 MG/ML
INJECTION INTRAMUSCULAR; INTRAVENOUS AS NEEDED
Status: DISCONTINUED | OUTPATIENT
Start: 2019-05-28 | End: 2019-05-28 | Stop reason: SURG

## 2019-05-28 RX ORDER — TRAMADOL HYDROCHLORIDE 50 MG/1
50 TABLET ORAL EVERY 6 HOURS PRN
Status: DISCONTINUED | OUTPATIENT
Start: 2019-05-28 | End: 2019-05-31 | Stop reason: HOSPADM

## 2019-05-28 RX ORDER — DEXAMETHASONE SODIUM PHOSPHATE 4 MG/ML
INJECTION, SOLUTION INTRA-ARTICULAR; INTRALESIONAL; INTRAMUSCULAR; INTRAVENOUS; SOFT TISSUE
Status: COMPLETED | OUTPATIENT
Start: 2019-05-28 | End: 2019-05-28

## 2019-05-28 RX ORDER — OMEPRAZOLE/SODIUM BICARBONATE 40; 1680 MG/1; MG/1
1 POWDER, FOR SUSPENSION ORAL DAILY
Status: DISCONTINUED | OUTPATIENT
Start: 2019-05-28 | End: 2019-05-28 | Stop reason: CLARIF

## 2019-05-28 RX ORDER — SODIUM CHLORIDE 0.9 % (FLUSH) 0.9 %
3 SYRINGE (ML) INJECTION EVERY 12 HOURS SCHEDULED
Status: DISCONTINUED | OUTPATIENT
Start: 2019-05-28 | End: 2019-05-31 | Stop reason: HOSPADM

## 2019-05-28 RX ORDER — MORPHINE SULFATE 2 MG/ML
6 INJECTION, SOLUTION INTRAMUSCULAR; INTRAVENOUS
Status: DISCONTINUED | OUTPATIENT
Start: 2019-05-28 | End: 2019-05-31 | Stop reason: HOSPADM

## 2019-05-28 RX ORDER — SODIUM CHLORIDE 0.9 % (FLUSH) 0.9 %
3 SYRINGE (ML) INJECTION EVERY 12 HOURS SCHEDULED
Status: DISCONTINUED | OUTPATIENT
Start: 2019-05-28 | End: 2019-05-28 | Stop reason: HOSPADM

## 2019-05-28 RX ORDER — OXYCODONE AND ACETAMINOPHEN 7.5; 325 MG/1; MG/1
1 TABLET ORAL ONCE AS NEEDED
Status: DISCONTINUED | OUTPATIENT
Start: 2019-05-28 | End: 2019-05-28 | Stop reason: HOSPADM

## 2019-05-28 RX ORDER — PROMETHAZINE HYDROCHLORIDE 25 MG/1
25 TABLET ORAL ONCE AS NEEDED
Status: DISCONTINUED | OUTPATIENT
Start: 2019-05-28 | End: 2019-05-28 | Stop reason: HOSPADM

## 2019-05-28 RX ORDER — SODIUM CHLORIDE 450 MG/100ML
100 INJECTION, SOLUTION INTRAVENOUS CONTINUOUS
Status: DISCONTINUED | OUTPATIENT
Start: 2019-05-28 | End: 2019-05-31 | Stop reason: HOSPADM

## 2019-05-28 RX ORDER — SODIUM CHLORIDE, SODIUM LACTATE, POTASSIUM CHLORIDE, CALCIUM CHLORIDE 600; 310; 30; 20 MG/100ML; MG/100ML; MG/100ML; MG/100ML
9 INJECTION, SOLUTION INTRAVENOUS CONTINUOUS
Status: DISCONTINUED | OUTPATIENT
Start: 2019-05-28 | End: 2019-05-31 | Stop reason: HOSPADM

## 2019-05-28 RX ORDER — FENTANYL CITRATE 50 UG/ML
50 INJECTION, SOLUTION INTRAMUSCULAR; INTRAVENOUS
Status: DISCONTINUED | OUTPATIENT
Start: 2019-05-28 | End: 2019-05-28 | Stop reason: HOSPADM

## 2019-05-28 RX ORDER — ACETAMINOPHEN 500 MG
1000 TABLET ORAL ONCE
Status: COMPLETED | OUTPATIENT
Start: 2019-05-28 | End: 2019-05-28

## 2019-05-28 RX ORDER — ASPIRIN 81 MG/1
81 TABLET ORAL 2 TIMES DAILY WITH MEALS
Status: DISCONTINUED | OUTPATIENT
Start: 2019-05-28 | End: 2019-05-31 | Stop reason: HOSPADM

## 2019-05-28 RX ORDER — EPHEDRINE SULFATE 50 MG/ML
INJECTION, SOLUTION INTRAVENOUS AS NEEDED
Status: DISCONTINUED | OUTPATIENT
Start: 2019-05-28 | End: 2019-05-28 | Stop reason: SURG

## 2019-05-28 RX ORDER — EPHEDRINE SULFATE 50 MG/ML
5 INJECTION, SOLUTION INTRAVENOUS ONCE AS NEEDED
Status: DISCONTINUED | OUTPATIENT
Start: 2019-05-28 | End: 2019-05-28 | Stop reason: HOSPADM

## 2019-05-28 RX ORDER — DIAZEPAM 5 MG/1
5 TABLET ORAL EVERY 6 HOURS PRN
Status: DISCONTINUED | OUTPATIENT
Start: 2019-05-28 | End: 2019-05-31 | Stop reason: HOSPADM

## 2019-05-28 RX ORDER — SENNA AND DOCUSATE SODIUM 50; 8.6 MG/1; MG/1
2 TABLET, FILM COATED ORAL 2 TIMES DAILY PRN
Status: DISCONTINUED | OUTPATIENT
Start: 2019-05-28 | End: 2019-05-31 | Stop reason: HOSPADM

## 2019-05-28 RX ORDER — SODIUM CHLORIDE 0.9 % (FLUSH) 0.9 %
1-10 SYRINGE (ML) INJECTION AS NEEDED
Status: DISCONTINUED | OUTPATIENT
Start: 2019-05-28 | End: 2019-05-31 | Stop reason: HOSPADM

## 2019-05-28 RX ORDER — BUPIVACAINE HYDROCHLORIDE AND EPINEPHRINE 5; 5 MG/ML; UG/ML
INJECTION, SOLUTION EPIDURAL; INTRACAUDAL; PERINEURAL
Status: COMPLETED | OUTPATIENT
Start: 2019-05-28 | End: 2019-05-28

## 2019-05-28 RX ORDER — DEXAMETHASONE SODIUM PHOSPHATE 10 MG/ML
INJECTION INTRAMUSCULAR; INTRAVENOUS AS NEEDED
Status: DISCONTINUED | OUTPATIENT
Start: 2019-05-28 | End: 2019-05-28 | Stop reason: SURG

## 2019-05-28 RX ORDER — ACETAMINOPHEN 325 MG/1
325 TABLET ORAL EVERY 4 HOURS PRN
Status: DISCONTINUED | OUTPATIENT
Start: 2019-05-28 | End: 2019-05-31 | Stop reason: HOSPADM

## 2019-05-28 RX ORDER — MEPERIDINE HYDROCHLORIDE 25 MG/ML
6.25 INJECTION INTRAMUSCULAR; INTRAVENOUS; SUBCUTANEOUS
Status: DISCONTINUED | OUTPATIENT
Start: 2019-05-28 | End: 2019-05-28 | Stop reason: HOSPADM

## 2019-05-28 RX ORDER — CELECOXIB 200 MG/1
200 CAPSULE ORAL ONCE
Status: COMPLETED | OUTPATIENT
Start: 2019-05-28 | End: 2019-05-28

## 2019-05-28 RX ORDER — MIDAZOLAM HYDROCHLORIDE 1 MG/ML
2 INJECTION INTRAMUSCULAR; INTRAVENOUS
Status: DISCONTINUED | OUTPATIENT
Start: 2019-05-28 | End: 2019-05-28 | Stop reason: HOSPADM

## 2019-05-28 RX ORDER — NALOXONE HCL 0.4 MG/ML
0.4 VIAL (ML) INJECTION
Status: DISCONTINUED | OUTPATIENT
Start: 2019-05-28 | End: 2019-05-31 | Stop reason: HOSPADM

## 2019-05-28 RX ORDER — HYDROCODONE BITARTRATE AND ACETAMINOPHEN 7.5; 325 MG/1; MG/1
1 TABLET ORAL ONCE AS NEEDED
Status: DISCONTINUED | OUTPATIENT
Start: 2019-05-28 | End: 2019-05-28 | Stop reason: HOSPADM

## 2019-05-28 RX ORDER — FERROUS SULFATE 325(65) MG
325 TABLET ORAL
Status: DISCONTINUED | OUTPATIENT
Start: 2019-05-29 | End: 2019-05-31 | Stop reason: HOSPADM

## 2019-05-28 RX ORDER — HYDROMORPHONE HYDROCHLORIDE 1 MG/ML
0.5 INJECTION, SOLUTION INTRAMUSCULAR; INTRAVENOUS; SUBCUTANEOUS
Status: DISCONTINUED | OUTPATIENT
Start: 2019-05-28 | End: 2019-05-28 | Stop reason: HOSPADM

## 2019-05-28 RX ORDER — TAMSULOSIN HYDROCHLORIDE 0.4 MG/1
0.4 CAPSULE ORAL NIGHTLY
Status: DISCONTINUED | OUTPATIENT
Start: 2019-05-28 | End: 2019-05-31 | Stop reason: HOSPADM

## 2019-05-28 RX ORDER — KETOROLAC TROMETHAMINE 30 MG/ML
INJECTION, SOLUTION INTRAMUSCULAR; INTRAVENOUS AS NEEDED
Status: DISCONTINUED | OUTPATIENT
Start: 2019-05-28 | End: 2019-05-28 | Stop reason: SURG

## 2019-05-28 RX ORDER — FENTANYL CITRATE 50 UG/ML
100 INJECTION, SOLUTION INTRAMUSCULAR; INTRAVENOUS
Status: DISCONTINUED | OUTPATIENT
Start: 2019-05-28 | End: 2019-05-28 | Stop reason: HOSPADM

## 2019-05-28 RX ORDER — PANTOPRAZOLE SODIUM 40 MG/1
40 TABLET, DELAYED RELEASE ORAL EVERY MORNING
Status: DISCONTINUED | OUTPATIENT
Start: 2019-05-29 | End: 2019-05-31 | Stop reason: HOSPADM

## 2019-05-28 RX ORDER — MIDAZOLAM HYDROCHLORIDE 1 MG/ML
1 INJECTION INTRAMUSCULAR; INTRAVENOUS
Status: DISCONTINUED | OUTPATIENT
Start: 2019-05-28 | End: 2019-05-28 | Stop reason: HOSPADM

## 2019-05-28 RX ORDER — PROPOFOL 10 MG/ML
VIAL (ML) INTRAVENOUS AS NEEDED
Status: DISCONTINUED | OUTPATIENT
Start: 2019-05-28 | End: 2019-05-28 | Stop reason: SURG

## 2019-05-28 RX ORDER — ONDANSETRON 4 MG/1
4 TABLET, FILM COATED ORAL EVERY 6 HOURS PRN
Status: DISCONTINUED | OUTPATIENT
Start: 2019-05-28 | End: 2019-05-31 | Stop reason: HOSPADM

## 2019-05-28 RX ORDER — BISACODYL 10 MG
10 SUPPOSITORY, RECTAL RECTAL DAILY PRN
Status: DISCONTINUED | OUTPATIENT
Start: 2019-05-28 | End: 2019-05-31 | Stop reason: HOSPADM

## 2019-05-28 RX ORDER — TRANEXAMIC ACID 100 MG/ML
INJECTION, SOLUTION INTRAVENOUS AS NEEDED
Status: DISCONTINUED | OUTPATIENT
Start: 2019-05-28 | End: 2019-05-28 | Stop reason: SURG

## 2019-05-28 RX ADMIN — ROCURONIUM BROMIDE 5 MG: 10 INJECTION INTRAVENOUS at 12:04

## 2019-05-28 RX ADMIN — CEFAZOLIN SODIUM 2 G: 2 INJECTION, SOLUTION INTRAVENOUS at 12:00

## 2019-05-28 RX ADMIN — ONDANSETRON HYDROCHLORIDE 4 MG: 2 SOLUTION INTRAMUSCULAR; INTRAVENOUS at 17:01

## 2019-05-28 RX ADMIN — CELECOXIB 200 MG: 200 CAPSULE ORAL at 09:18

## 2019-05-28 RX ADMIN — MIDAZOLAM 1 MG: 1 INJECTION INTRAMUSCULAR; INTRAVENOUS at 09:43

## 2019-05-28 RX ADMIN — CLINDAMYCIN PHOSPHATE 900 MG: 900 INJECTION, SOLUTION INTRAVENOUS at 21:08

## 2019-05-28 RX ADMIN — TRANEXAMIC ACID 1000 MG: 100 INJECTION, SOLUTION INTRAVENOUS at 12:10

## 2019-05-28 RX ADMIN — DEXAMETHASONE SODIUM PHOSPHATE 8 MG: 10 INJECTION INTRAMUSCULAR; INTRAVENOUS at 12:15

## 2019-05-28 RX ADMIN — EPHEDRINE SULFATE 10 MG: 50 INJECTION INTRAMUSCULAR; INTRAVENOUS; SUBCUTANEOUS at 12:12

## 2019-05-28 RX ADMIN — ONDANSETRON 4 MG: 2 INJECTION INTRAMUSCULAR; INTRAVENOUS at 13:45

## 2019-05-28 RX ADMIN — CEFAZOLIN SODIUM 2 G: 2 INJECTION, SOLUTION INTRAVENOUS at 13:40

## 2019-05-28 RX ADMIN — SODIUM CHLORIDE, PRESERVATIVE FREE 3 ML: 5 INJECTION INTRAVENOUS at 21:10

## 2019-05-28 RX ADMIN — FENTANYL CITRATE 50 MCG: 50 INJECTION INTRAMUSCULAR; INTRAVENOUS at 15:23

## 2019-05-28 RX ADMIN — FENTANYL CITRATE 50 MCG: 50 INJECTION, SOLUTION INTRAMUSCULAR; INTRAVENOUS at 13:55

## 2019-05-28 RX ADMIN — SODIUM CHLORIDE, POTASSIUM CHLORIDE, SODIUM LACTATE AND CALCIUM CHLORIDE: 600; 310; 30; 20 INJECTION, SOLUTION INTRAVENOUS at 13:50

## 2019-05-28 RX ADMIN — FENTANYL CITRATE 50 MCG: 50 INJECTION INTRAMUSCULAR; INTRAVENOUS at 15:09

## 2019-05-28 RX ADMIN — TAMSULOSIN HYDROCHLORIDE 0.4 MG: 0.4 CAPSULE ORAL at 21:08

## 2019-05-28 RX ADMIN — FENTANYL CITRATE 50 MCG: 50 INJECTION INTRAMUSCULAR; INTRAVENOUS at 14:26

## 2019-05-28 RX ADMIN — FENTANYL CITRATE 50 MCG: 50 INJECTION INTRAMUSCULAR; INTRAVENOUS at 14:40

## 2019-05-28 RX ADMIN — HYDROMORPHONE HYDROCHLORIDE 0.5 MG: 1 INJECTION, SOLUTION INTRAMUSCULAR; INTRAVENOUS; SUBCUTANEOUS at 14:55

## 2019-05-28 RX ADMIN — FENTANYL CITRATE 50 MCG: 50 INJECTION, SOLUTION INTRAMUSCULAR; INTRAVENOUS at 13:10

## 2019-05-28 RX ADMIN — MIDAZOLAM 1 MG: 1 INJECTION INTRAMUSCULAR; INTRAVENOUS at 10:28

## 2019-05-28 RX ADMIN — KETOROLAC TROMETHAMINE 30 MG: 30 INJECTION, SOLUTION INTRAMUSCULAR; INTRAVENOUS at 13:45

## 2019-05-28 RX ADMIN — FENTANYL CITRATE 50 MCG: 50 INJECTION, SOLUTION INTRAMUSCULAR; INTRAVENOUS at 12:45

## 2019-05-28 RX ADMIN — ASPIRIN 81 MG: 81 TABLET, DELAYED RELEASE ORAL at 18:30

## 2019-05-28 RX ADMIN — FENTANYL CITRATE 50 MCG: 50 INJECTION, SOLUTION INTRAMUSCULAR; INTRAVENOUS at 12:40

## 2019-05-28 RX ADMIN — EPHEDRINE SULFATE 5 MG: 50 INJECTION INTRAMUSCULAR; INTRAVENOUS; SUBCUTANEOUS at 12:25

## 2019-05-28 RX ADMIN — LIDOCAINE HYDROCHLORIDE 50 MG: 20 INJECTION, SOLUTION INFILTRATION; PERINEURAL at 12:04

## 2019-05-28 RX ADMIN — ACETAMINOPHEN 1000 MG: 500 TABLET, FILM COATED ORAL at 09:18

## 2019-05-28 RX ADMIN — FENTANYL CITRATE 50 MCG: 50 INJECTION, SOLUTION INTRAMUSCULAR; INTRAVENOUS at 12:02

## 2019-05-28 RX ADMIN — FENTANYL CITRATE 50 MCG: 50 INJECTION INTRAMUSCULAR; INTRAVENOUS at 10:28

## 2019-05-28 RX ADMIN — SUCCINYLCHOLINE CHLORIDE 100 MG: 20 INJECTION, SOLUTION INTRAMUSCULAR; INTRAVENOUS; PARENTERAL at 12:04

## 2019-05-28 RX ADMIN — PROPOFOL 100 MG: 10 INJECTION, EMULSION INTRAVENOUS at 12:04

## 2019-05-28 RX ADMIN — EPHEDRINE SULFATE 10 MG: 50 INJECTION INTRAMUSCULAR; INTRAVENOUS; SUBCUTANEOUS at 12:08

## 2019-05-28 RX ADMIN — OXYCODONE HYDROCHLORIDE AND ACETAMINOPHEN 1 TABLET: 5; 325 TABLET ORAL at 17:01

## 2019-05-28 RX ADMIN — SODIUM CHLORIDE, POTASSIUM CHLORIDE, SODIUM LACTATE AND CALCIUM CHLORIDE 9 ML/HR: 600; 310; 30; 20 INJECTION, SOLUTION INTRAVENOUS at 09:42

## 2019-05-28 RX ADMIN — BUPIVACAINE HYDROCHLORIDE AND EPINEPHRINE BITARTRATE 20 ML: 5; .0091 INJECTION, SOLUTION EPIDURAL; INTRACAUDAL; PERINEURAL at 10:24

## 2019-05-28 RX ADMIN — DEXAMETHASONE SODIUM PHOSPHATE 4 MG: 4 INJECTION INTRA-ARTICULAR; INTRALESIONAL; INTRAMUSCULAR; INTRAVENOUS; SOFT TISSUE at 10:24

## 2019-05-28 RX ADMIN — MUPIROCIN 1 APPLICATION: 20 OINTMENT TOPICAL at 21:08

## 2019-05-28 RX ADMIN — FAMOTIDINE 20 MG: 10 INJECTION INTRAVENOUS at 09:42

## 2019-05-28 NOTE — ANESTHESIA PROCEDURE NOTES
Airway  Urgency: elective    Airway not difficult    General Information and Staff    Patient location during procedure: OR  Anesthesiologist: Wili Brown MD  CRNA: Amie Burgess CRNA    Indications and Patient Condition  Indications for airway management: airway protection    Preoxygenated: yes  MILS maintained throughout  Mask difficulty assessment: 1 - vent by mask    Final Airway Details  Final airway type: endotracheal airway      Successful airway: ETT  Cuffed: yes   Successful intubation technique: direct laryngoscopy  Facilitating devices/methods: intubating stylet  Endotracheal tube insertion site: oral  Blade: Sanket  Blade size: 4  ETT size (mm): 7.5  Cormack-Lehane Classification: grade I - full view of glottis  Placement verified by: chest auscultation and capnometry   Cuff volume (mL): 6  Measured from: lips  ETT to lips (cm): 21  Number of attempts at approach: 1    Additional Comments  Atraumatic ET Tube placement.  Teeth as pre-op. BLEBS.  -ABD sounds.  +ET CO2.  Secured to face

## 2019-05-28 NOTE — ANESTHESIA PREPROCEDURE EVALUATION
Anesthesia Evaluation     Patient summary reviewed and Nursing notes reviewed   NPO Solid Status: > 8 hours             Airway   Mallampati: II  TM distance: >3 FB  Neck ROM: full  no difficulty expected  Dental - normal exam     Pulmonary - normal exam   (+) a smoker Current,   Cardiovascular - negative cardio ROS and normal exam        Neuro/Psych- negative ROS  GI/Hepatic/Renal/Endo    (+)  GERD,      Musculoskeletal (-) negative ROS    Abdominal  - normal exam   Substance History - negative use     OB/GYN negative ob/gyn ROS         Other                          Anesthesia Plan    ASA 2     general   (Add canal popc)  intravenous induction   Anesthetic plan, all risks, benefits, and alternatives have been provided, discussed and informed consent has been obtained with: patient.    Plan discussed with CRNA.

## 2019-05-28 NOTE — ANESTHESIA PROCEDURE NOTES
Peripheral Block    Pre-sedation assessment completed: 5/28/2019 10:24 AM    Patient reassessed immediately prior to procedure    Patient location during procedure: holding area  Start time: 5/28/2019 10:24 AM  Stop time: 5/28/2019 10:35 AM  Reason for block: procedure for pain, at surgeon's request and post-op pain management  Performed by  Anesthesiologist: Wili Brown MD  Preanesthetic Checklist  Completed: patient identified, site marked, surgical consent, pre-op evaluation, timeout performed, IV checked, risks and benefits discussed and monitors and equipment checked  Prep:  Pt Position: supine  Sterile barriers:cap, gloves, mask and sterile barriers  Prep: ChloraPrep  Patient monitoring: blood pressure monitoring, continuous pulse oximetry and EKG  Procedure  Sedation:yes  Performed under: MAC  Guidance:ultrasound guided  ULTRASOUND INTERPRETATION. Using ultrasound guidance a 20 G gauge needle was placed in close proximity to the nerve, at which point, under ultrasound guidance anesthetic was injected in the area of the nerve and spread of the anesthesia was seen on ultrasound in close proximity thereto.  There were no abnormalities seen on ultrasound; a digital image was taken; and the patient tolerated the procedure with no complications. Images:still images obtained    Laterality:right  Block Type:adductor canal block  Injection Technique:single-shot  Needle Type:echogenic  Needle Gauge:20 G  Resistance on Injection: none  Cath Depth at skin: 10 cm  Medications Used: dexamethasone (DECADRON) injection, 4 mg  bupivacaine-EPINEPHrine PF (MARCAINE w/EPI) 0.5% -[id number] injection, 20 mL  Post Assessment  Injection Assessment: negative aspiration for heme, no paresthesia on injection and incremental injection  Patient Tolerance:comfortable throughout block  Complications:no

## 2019-05-28 NOTE — ANESTHESIA POSTPROCEDURE EVALUATION
Patient: Feliciano Noguera    Procedure Summary     Date:  05/28/19 Room / Location:  [facility] OR [address] [place] [address] MAIN OR    Anesthesia Start:  1159 Anesthesia Stop:  1406    Procedure:  RIGHT TOTAL KNEE ARTHROPLASTY (Right Knee) Diagnosis:      Surgeon:  Bola Ramires MD Provider:  Wili Brown MD    Anesthesia Type:  general ASA Status:  2          Anesthesia Type: general  Last vitals  BP   129/82 (05/28/19 1445)   Temp   36.4 °C (97.5 °F) (05/28/19 1359)   Pulse   87 (05/28/19 1445)   Resp   16 (05/28/19 1445)     SpO2   97 % (05/28/19 1445)     Post Anesthesia Care and Evaluation    Patient location during evaluation: PACU  Anesthetic complications: No anesthetic complications

## 2019-05-29 LAB
ANION GAP SERPL CALCULATED.3IONS-SCNC: 12.3 MMOL/L
BUN BLD-MCNC: 12 MG/DL (ref 8–23)
BUN/CREAT SERPL: 12.4 (ref 7–25)
CALCIUM SPEC-SCNC: 8.8 MG/DL (ref 8.6–10.5)
CHLORIDE SERPL-SCNC: 100 MMOL/L (ref 98–107)
CO2 SERPL-SCNC: 23.7 MMOL/L (ref 22–29)
CREAT BLD-MCNC: 0.97 MG/DL (ref 0.76–1.27)
DEPRECATED RDW RBC AUTO: 39.6 FL (ref 37–54)
ERYTHROCYTE [DISTWIDTH] IN BLOOD BY AUTOMATED COUNT: 12.6 % (ref 12.3–15.4)
GFR SERPL CREATININE-BSD FRML MDRD: 76 ML/MIN/1.73
GLUCOSE BLD-MCNC: 129 MG/DL (ref 65–99)
HCT VFR BLD AUTO: 37.6 % (ref 37.5–51)
HGB BLD-MCNC: 12.3 G/DL (ref 13–17.7)
MCH RBC QN AUTO: 28 PG (ref 26.6–33)
MCHC RBC AUTO-ENTMCNC: 32.7 G/DL (ref 31.5–35.7)
MCV RBC AUTO: 85.6 FL (ref 79–97)
PLATELET # BLD AUTO: 195 10*3/MM3 (ref 140–450)
PMV BLD AUTO: 10.7 FL (ref 6–12)
POTASSIUM BLD-SCNC: 4.4 MMOL/L (ref 3.5–5.2)
RBC # BLD AUTO: 4.39 10*6/MM3 (ref 4.14–5.8)
SODIUM BLD-SCNC: 136 MMOL/L (ref 136–145)
WBC NRBC COR # BLD: 12.22 10*3/MM3 (ref 3.4–10.8)

## 2019-05-29 PROCEDURE — 97110 THERAPEUTIC EXERCISES: CPT

## 2019-05-29 PROCEDURE — 85027 COMPLETE CBC AUTOMATED: CPT | Performed by: ORTHOPAEDIC SURGERY

## 2019-05-29 PROCEDURE — 97150 GROUP THERAPEUTIC PROCEDURES: CPT

## 2019-05-29 PROCEDURE — 80048 BASIC METABOLIC PNL TOTAL CA: CPT | Performed by: ORTHOPAEDIC SURGERY

## 2019-05-29 RX ORDER — CALCIUM CARBONATE 200(500)MG
2 TABLET,CHEWABLE ORAL 3 TIMES DAILY PRN
Status: DISCONTINUED | OUTPATIENT
Start: 2019-05-29 | End: 2019-05-31 | Stop reason: HOSPADM

## 2019-05-29 RX ADMIN — MUPIROCIN 1 APPLICATION: 20 OINTMENT TOPICAL at 08:10

## 2019-05-29 RX ADMIN — ASPIRIN 81 MG: 81 TABLET, DELAYED RELEASE ORAL at 18:00

## 2019-05-29 RX ADMIN — SODIUM CHLORIDE, PRESERVATIVE FREE 3 ML: 5 INJECTION INTRAVENOUS at 08:10

## 2019-05-29 RX ADMIN — MUPIROCIN 1 APPLICATION: 20 OINTMENT TOPICAL at 20:44

## 2019-05-29 RX ADMIN — PANTOPRAZOLE SODIUM 40 MG: 40 TABLET, DELAYED RELEASE ORAL at 06:05

## 2019-05-29 RX ADMIN — OXYCODONE HYDROCHLORIDE AND ACETAMINOPHEN 1 TABLET: 5; 325 TABLET ORAL at 22:59

## 2019-05-29 RX ADMIN — FERROUS SULFATE TAB 325 MG (65 MG ELEMENTAL FE) 325 MG: 325 (65 FE) TAB at 08:10

## 2019-05-29 RX ADMIN — OXYCODONE HYDROCHLORIDE AND ACETAMINOPHEN 2 TABLET: 5; 325 TABLET ORAL at 17:54

## 2019-05-29 RX ADMIN — TAMSULOSIN HYDROCHLORIDE 0.4 MG: 0.4 CAPSULE ORAL at 20:44

## 2019-05-29 RX ADMIN — OXYCODONE HYDROCHLORIDE AND ACETAMINOPHEN 2 TABLET: 5; 325 TABLET ORAL at 08:10

## 2019-05-29 RX ADMIN — OXYCODONE HYDROCHLORIDE AND ACETAMINOPHEN 1 TABLET: 5; 325 TABLET ORAL at 03:39

## 2019-05-29 RX ADMIN — OXYCODONE HYDROCHLORIDE AND ACETAMINOPHEN 2 TABLET: 5; 325 TABLET ORAL at 13:29

## 2019-05-29 RX ADMIN — ASPIRIN 81 MG: 81 TABLET, DELAYED RELEASE ORAL at 08:10

## 2019-05-29 RX ADMIN — Medication 2 TABLET: at 16:37

## 2019-05-29 RX ADMIN — SODIUM CHLORIDE, PRESERVATIVE FREE 3 ML: 5 INJECTION INTRAVENOUS at 20:44

## 2019-05-29 RX ADMIN — CLINDAMYCIN PHOSPHATE 900 MG: 900 INJECTION, SOLUTION INTRAVENOUS at 06:05

## 2019-05-30 LAB
DEPRECATED RDW RBC AUTO: 42.4 FL (ref 37–54)
ERYTHROCYTE [DISTWIDTH] IN BLOOD BY AUTOMATED COUNT: 13.1 % (ref 12.3–15.4)
HCT VFR BLD AUTO: 33.7 % (ref 37.5–51)
HGB BLD-MCNC: 10.8 G/DL (ref 13–17.7)
MCH RBC QN AUTO: 28.4 PG (ref 26.6–33)
MCHC RBC AUTO-ENTMCNC: 32 G/DL (ref 31.5–35.7)
MCV RBC AUTO: 88.7 FL (ref 79–97)
PLATELET # BLD AUTO: 161 10*3/MM3 (ref 140–450)
PMV BLD AUTO: 11 FL (ref 6–12)
RBC # BLD AUTO: 3.8 10*6/MM3 (ref 4.14–5.8)
WBC NRBC COR # BLD: 8.26 10*3/MM3 (ref 3.4–10.8)

## 2019-05-30 PROCEDURE — 97110 THERAPEUTIC EXERCISES: CPT

## 2019-05-30 PROCEDURE — 25010000002 KETOROLAC TROMETHAMINE PER 15 MG: Performed by: ORTHOPAEDIC SURGERY

## 2019-05-30 PROCEDURE — 85027 COMPLETE CBC AUTOMATED: CPT | Performed by: ORTHOPAEDIC SURGERY

## 2019-05-30 PROCEDURE — 97150 GROUP THERAPEUTIC PROCEDURES: CPT

## 2019-05-30 PROCEDURE — 25010000002 MORPHINE PER 10 MG: Performed by: ORTHOPAEDIC SURGERY

## 2019-05-30 RX ORDER — HYDROCODONE BITARTRATE AND ACETAMINOPHEN 5; 325 MG/1; MG/1
2 TABLET ORAL EVERY 4 HOURS PRN
Status: DISCONTINUED | OUTPATIENT
Start: 2019-05-30 | End: 2019-05-31 | Stop reason: HOSPADM

## 2019-05-30 RX ORDER — HYDROCODONE BITARTRATE AND ACETAMINOPHEN 5; 325 MG/1; MG/1
1 TABLET ORAL EVERY 4 HOURS PRN
Status: DISCONTINUED | OUTPATIENT
Start: 2019-05-30 | End: 2019-05-31 | Stop reason: HOSPADM

## 2019-05-30 RX ADMIN — ONDANSETRON 4 MG: 4 TABLET, FILM COATED ORAL at 03:34

## 2019-05-30 RX ADMIN — ASPIRIN 81 MG: 81 TABLET, DELAYED RELEASE ORAL at 08:41

## 2019-05-30 RX ADMIN — MORPHINE SULFATE 6 MG: 2 INJECTION, SOLUTION INTRAMUSCULAR; INTRAVENOUS at 15:44

## 2019-05-30 RX ADMIN — ASPIRIN 81 MG: 81 TABLET, DELAYED RELEASE ORAL at 18:10

## 2019-05-30 RX ADMIN — TAMSULOSIN HYDROCHLORIDE 0.4 MG: 0.4 CAPSULE ORAL at 20:13

## 2019-05-30 RX ADMIN — HYDROCODONE BITARTRATE AND ACETAMINOPHEN 2 TABLET: 5; 325 TABLET ORAL at 22:07

## 2019-05-30 RX ADMIN — SODIUM CHLORIDE, PRESERVATIVE FREE 3 ML: 5 INJECTION INTRAVENOUS at 20:16

## 2019-05-30 RX ADMIN — HYDROCODONE BITARTRATE AND ACETAMINOPHEN 2 TABLET: 5; 325 TABLET ORAL at 08:41

## 2019-05-30 RX ADMIN — FERROUS SULFATE TAB 325 MG (65 MG ELEMENTAL FE) 325 MG: 325 (65 FE) TAB at 08:41

## 2019-05-30 RX ADMIN — SODIUM CHLORIDE, PRESERVATIVE FREE 3 ML: 5 INJECTION INTRAVENOUS at 08:42

## 2019-05-30 RX ADMIN — OXYCODONE HYDROCHLORIDE AND ACETAMINOPHEN 1 TABLET: 5; 325 TABLET ORAL at 03:34

## 2019-05-30 RX ADMIN — HYDROCODONE BITARTRATE AND ACETAMINOPHEN 2 TABLET: 5; 325 TABLET ORAL at 12:34

## 2019-05-30 RX ADMIN — HYDROCODONE BITARTRATE AND ACETAMINOPHEN 2 TABLET: 5; 325 TABLET ORAL at 18:10

## 2019-05-30 RX ADMIN — MUPIROCIN 1 APPLICATION: 20 OINTMENT TOPICAL at 08:41

## 2019-05-30 RX ADMIN — PANTOPRAZOLE SODIUM 40 MG: 40 TABLET, DELAYED RELEASE ORAL at 06:30

## 2019-05-30 RX ADMIN — DIAZEPAM 5 MG: 5 TABLET ORAL at 20:14

## 2019-05-30 RX ADMIN — KETOROLAC TROMETHAMINE 15 MG: 30 INJECTION, SOLUTION INTRAMUSCULAR at 20:13

## 2019-05-31 VITALS
DIASTOLIC BLOOD PRESSURE: 85 MMHG | OXYGEN SATURATION: 93 % | HEIGHT: 71 IN | BODY MASS INDEX: 25 KG/M2 | TEMPERATURE: 97.8 F | SYSTOLIC BLOOD PRESSURE: 133 MMHG | WEIGHT: 178.57 LBS | RESPIRATION RATE: 16 BRPM | HEART RATE: 90 BPM

## 2019-05-31 LAB
DEPRECATED RDW RBC AUTO: 41.3 FL (ref 37–54)
ERYTHROCYTE [DISTWIDTH] IN BLOOD BY AUTOMATED COUNT: 12.7 % (ref 12.3–15.4)
HCT VFR BLD AUTO: 33.6 % (ref 37.5–51)
HGB BLD-MCNC: 10.7 G/DL (ref 13–17.7)
MCH RBC QN AUTO: 28.2 PG (ref 26.6–33)
MCHC RBC AUTO-ENTMCNC: 31.8 G/DL (ref 31.5–35.7)
MCV RBC AUTO: 88.7 FL (ref 79–97)
PLATELET # BLD AUTO: 177 10*3/MM3 (ref 140–450)
PMV BLD AUTO: 10.9 FL (ref 6–12)
RBC # BLD AUTO: 3.79 10*6/MM3 (ref 4.14–5.8)
WBC NRBC COR # BLD: 7.29 10*3/MM3 (ref 3.4–10.8)

## 2019-05-31 PROCEDURE — 97110 THERAPEUTIC EXERCISES: CPT

## 2019-05-31 PROCEDURE — 85027 COMPLETE CBC AUTOMATED: CPT | Performed by: ORTHOPAEDIC SURGERY

## 2019-05-31 PROCEDURE — 97150 GROUP THERAPEUTIC PROCEDURES: CPT

## 2019-05-31 RX ORDER — PSEUDOEPHEDRINE HCL 30 MG
100 TABLET ORAL 2 TIMES DAILY PRN
Qty: 30 EACH | Refills: 1 | Status: SHIPPED | OUTPATIENT
Start: 2019-05-31 | End: 2021-06-25

## 2019-05-31 RX ORDER — ASPIRIN 81 MG/1
81 TABLET ORAL 2 TIMES DAILY WITH MEALS
Qty: 60 TABLET | Refills: 0 | Status: SHIPPED | OUTPATIENT
Start: 2019-05-31 | End: 2019-09-14

## 2019-05-31 RX ORDER — HYDROCODONE BITARTRATE AND ACETAMINOPHEN 5; 325 MG/1; MG/1
1-2 TABLET ORAL EVERY 4 HOURS PRN
Qty: 84 TABLET | Refills: 0 | Status: SHIPPED | OUTPATIENT
Start: 2019-05-31 | End: 2019-10-11

## 2019-05-31 RX ADMIN — ASPIRIN 81 MG: 81 TABLET, DELAYED RELEASE ORAL at 07:40

## 2019-05-31 RX ADMIN — HYDROCODONE BITARTRATE AND ACETAMINOPHEN 2 TABLET: 5; 325 TABLET ORAL at 07:41

## 2019-05-31 RX ADMIN — ONDANSETRON 4 MG: 4 TABLET, FILM COATED ORAL at 08:13

## 2019-05-31 RX ADMIN — FERROUS SULFATE TAB 325 MG (65 MG ELEMENTAL FE) 325 MG: 325 (65 FE) TAB at 07:40

## 2019-05-31 RX ADMIN — SODIUM CHLORIDE, PRESERVATIVE FREE 3 ML: 5 INJECTION INTRAVENOUS at 07:41

## 2019-05-31 RX ADMIN — HYDROCODONE BITARTRATE AND ACETAMINOPHEN 2 TABLET: 5; 325 TABLET ORAL at 02:10

## 2019-05-31 RX ADMIN — PANTOPRAZOLE SODIUM 40 MG: 40 TABLET, DELAYED RELEASE ORAL at 02:10

## 2019-09-14 ENCOUNTER — APPOINTMENT (OUTPATIENT)
Dept: CT IMAGING | Facility: HOSPITAL | Age: 69
End: 2019-09-14

## 2019-09-14 ENCOUNTER — HOSPITAL ENCOUNTER (EMERGENCY)
Facility: HOSPITAL | Age: 69
Discharge: HOME | End: 2019-09-14
Attending: EMERGENCY MEDICINE | Admitting: EMERGENCY MEDICINE

## 2019-09-14 VITALS
WEIGHT: 183 LBS | BODY MASS INDEX: 25.62 KG/M2 | TEMPERATURE: 97.6 F | HEART RATE: 90 BPM | OXYGEN SATURATION: 99 % | HEIGHT: 71 IN | RESPIRATION RATE: 17 BRPM | SYSTOLIC BLOOD PRESSURE: 127 MMHG | DIASTOLIC BLOOD PRESSURE: 84 MMHG

## 2019-09-14 DIAGNOSIS — R19.7 DIARRHEA, UNSPECIFIED TYPE: ICD-10-CM

## 2019-09-14 DIAGNOSIS — K57.30 DIVERTICULOSIS OF LARGE INTESTINE WITHOUT HEMORRHAGE: ICD-10-CM

## 2019-09-14 DIAGNOSIS — R10.84 GENERALIZED ABDOMINAL PAIN: Primary | ICD-10-CM

## 2019-09-14 LAB
ALBUMIN SERPL-MCNC: 4 G/DL (ref 3.5–5.2)
ALBUMIN/GLOB SERPL: 1.3 G/DL
ALP SERPL-CCNC: 96 U/L (ref 39–117)
ALT SERPL W P-5'-P-CCNC: 18 U/L (ref 1–41)
ANION GAP SERPL CALCULATED.3IONS-SCNC: 11.6 MMOL/L (ref 5–15)
AST SERPL-CCNC: 14 U/L (ref 1–40)
BASOPHILS # BLD AUTO: 0.04 10*3/MM3 (ref 0–0.2)
BASOPHILS NFR BLD AUTO: 0.5 % (ref 0–1.5)
BILIRUB SERPL-MCNC: 0.7 MG/DL (ref 0.2–1.2)
BILIRUB UR QL STRIP: NEGATIVE
BUN BLD-MCNC: 18 MG/DL (ref 8–23)
BUN/CREAT SERPL: 19.4 (ref 7–25)
CALCIUM SPEC-SCNC: 8.6 MG/DL (ref 8.6–10.5)
CHLORIDE SERPL-SCNC: 104 MMOL/L (ref 98–107)
CLARITY UR: CLEAR
CO2 SERPL-SCNC: 24.4 MMOL/L (ref 22–29)
COLOR UR: YELLOW
CREAT BLD-MCNC: 0.93 MG/DL (ref 0.76–1.27)
D-LACTATE SERPL-SCNC: 0.8 MMOL/L (ref 0.5–2)
DEPRECATED RDW RBC AUTO: 42 FL (ref 37–54)
EOSINOPHIL # BLD AUTO: 0.15 10*3/MM3 (ref 0–0.4)
EOSINOPHIL NFR BLD AUTO: 2.1 % (ref 0.3–6.2)
ERYTHROCYTE [DISTWIDTH] IN BLOOD BY AUTOMATED COUNT: 13.4 % (ref 12.3–15.4)
GFR SERPL CREATININE-BSD FRML MDRD: 80 ML/MIN/1.73
GLOBULIN UR ELPH-MCNC: 3 GM/DL
GLUCOSE BLD-MCNC: 99 MG/DL (ref 65–99)
GLUCOSE UR STRIP-MCNC: NEGATIVE MG/DL
HCT VFR BLD AUTO: 42.5 % (ref 37.5–51)
HGB BLD-MCNC: 13.2 G/DL (ref 13–17.7)
HGB UR QL STRIP.AUTO: NEGATIVE
IMM GRANULOCYTES # BLD AUTO: 0.03 10*3/MM3 (ref 0–0.05)
IMM GRANULOCYTES NFR BLD AUTO: 0.4 % (ref 0–0.5)
KETONES UR QL STRIP: NEGATIVE
LEUKOCYTE ESTERASE UR QL STRIP.AUTO: NEGATIVE
LIPASE SERPL-CCNC: 29 U/L (ref 13–60)
LYMPHOCYTES # BLD AUTO: 1.42 10*3/MM3 (ref 0.7–3.1)
LYMPHOCYTES NFR BLD AUTO: 19.5 % (ref 19.6–45.3)
MCH RBC QN AUTO: 26.8 PG (ref 26.6–33)
MCHC RBC AUTO-ENTMCNC: 31.1 G/DL (ref 31.5–35.7)
MCV RBC AUTO: 86.2 FL (ref 79–97)
MONOCYTES # BLD AUTO: 0.55 10*3/MM3 (ref 0.1–0.9)
MONOCYTES NFR BLD AUTO: 7.6 % (ref 5–12)
NEUTROPHILS # BLD AUTO: 5.09 10*3/MM3 (ref 1.7–7)
NEUTROPHILS NFR BLD AUTO: 69.9 % (ref 42.7–76)
NITRITE UR QL STRIP: NEGATIVE
NRBC BLD AUTO-RTO: 0 /100 WBC (ref 0–0.2)
PH UR STRIP.AUTO: <=5 [PH] (ref 5–8)
PLATELET # BLD AUTO: 224 10*3/MM3 (ref 140–450)
PMV BLD AUTO: 9.8 FL (ref 6–12)
POTASSIUM BLD-SCNC: 4.4 MMOL/L (ref 3.5–5.2)
PROT SERPL-MCNC: 7 G/DL (ref 6–8.5)
PROT UR QL STRIP: NEGATIVE
RBC # BLD AUTO: 4.93 10*6/MM3 (ref 4.14–5.8)
SODIUM BLD-SCNC: 140 MMOL/L (ref 136–145)
SP GR UR STRIP: >=1.03 (ref 1–1.03)
UROBILINOGEN UR QL STRIP: NORMAL
WBC NRBC COR # BLD: 7.28 10*3/MM3 (ref 3.4–10.8)

## 2019-09-14 PROCEDURE — 81003 URINALYSIS AUTO W/O SCOPE: CPT | Performed by: EMERGENCY MEDICINE

## 2019-09-14 PROCEDURE — 25010000002 IOPAMIDOL 61 % SOLUTION: Performed by: EMERGENCY MEDICINE

## 2019-09-14 PROCEDURE — 99283 EMERGENCY DEPT VISIT LOW MDM: CPT

## 2019-09-14 PROCEDURE — 80053 COMPREHEN METABOLIC PANEL: CPT | Performed by: EMERGENCY MEDICINE

## 2019-09-14 PROCEDURE — 83605 ASSAY OF LACTIC ACID: CPT | Performed by: EMERGENCY MEDICINE

## 2019-09-14 PROCEDURE — 83690 ASSAY OF LIPASE: CPT | Performed by: EMERGENCY MEDICINE

## 2019-09-14 PROCEDURE — 85025 COMPLETE CBC W/AUTO DIFF WBC: CPT | Performed by: EMERGENCY MEDICINE

## 2019-09-14 PROCEDURE — 74177 CT ABD & PELVIS W/CONTRAST: CPT

## 2019-09-14 RX ORDER — METRONIDAZOLE 500 MG/1
500 TABLET ORAL 3 TIMES DAILY
Qty: 30 TABLET | Refills: 0 | Status: SHIPPED | OUTPATIENT
Start: 2019-09-14 | End: 2019-10-11

## 2019-09-14 RX ADMIN — SODIUM CHLORIDE 1000 ML: 9 INJECTION, SOLUTION INTRAVENOUS at 11:39

## 2019-09-14 RX ADMIN — IOPAMIDOL 85 ML: 612 INJECTION, SOLUTION INTRAVENOUS at 12:33

## 2019-09-14 NOTE — ED PROVIDER NOTES
EMERGENCY DEPARTMENT ENCOUNTER    CHIEF COMPLAINT  Chief Complaint: N/V/D  History given by: Patient  History limited by: NA  Room Number: 23/23  PMD: Daniel Mahan MD      HPI:  Pt is a 69 y.o. male who presents complaining of nausea, vomiting, and diarrhea that began 3 days ago. Reports multiple emesis and diarrhea episodes. No blood noted in stool. Reports associated generalized abdominal tenderness. Denies fever/chills. Denies CP or SOA. Hx of cholecystectomy otherwise no other abdominal surgeries.     Duration/Onset/Timing: 3 days  Location: GI  Radiation: n/a  Quality: n/v/d  Intensity/Severity: moderate  Associated Symptoms: abdominal pain  Aggravating or Alleviating Factors: none specified  Previous Episodes: none      PAST MEDICAL HISTORY  Active Ambulatory Problems     Diagnosis Date Noted   • Precordial pain 06/28/2017   • Hyperglycemia 06/28/2017   • Fatigue 06/28/2017   • Snoring 06/28/2017   • Uncontrolled daytime somnolence 06/28/2017   • Benign prostatic hyperplasia without lower urinary tract symptoms 06/28/2017   • Tobacco abuse 06/28/2017   • Right upper quadrant abdominal pain 08/03/2017   • Hematochezia 11/06/2018   • Epigastric pain 11/06/2018   • Diarrhea 11/06/2018   • OA (osteoarthritis) of knee 05/28/2019     Resolved Ambulatory Problems     Diagnosis Date Noted   • Generalized abdominal pain 06/28/2017   • Calculus of gallbladder without cholecystitis without obstruction 07/28/2017     Past Medical History:   Diagnosis Date   • Arthritis    • Enlarged prostate    • GERD (gastroesophageal reflux disease)    • [place] (hard of hearing)    • Knee pain, right        PAST SURGICAL HISTORY  Past Surgical History:   Procedure Laterality Date   • CHOLECYSTECTOMY WITH INTRAOPERATIVE CHOLANGIOGRAM N/A 8/1/2017    Procedure: CHOLECYSTECTOMY LAPAROSCOPIC INTRAOPERATIVE CHOLANGIOGRAM;  Surgeon: Bola Patel MD;  [place] [facility] [place] [facility] [place]  Service:    • COLONOSCOPY N/A 09/24/2009    Dr. Swift  Alex   • COLONOSCOPY N/A 11/14/2018    Procedure: COLONOSCOPY into cecum with biopsy;  Surgeon: Bola Patel MD;  Location: [facility] ENDOSCOPY;  Service: General   • ENDOSCOPY N/A 12/15/2008    Dr. Jamison Johnson   • ENDOSCOPY N/A 11/14/2018    Procedure: ESOPHAGOGASTRODUODENOSCOPY with biopsy;  Surgeon: Bola Patel MD;  Location: [facility] ENDOSCOPY;  Service: General   • KNEE ARTHROSCOPY Left    • PROSTATE BIOPSY     • ROTATOR CUFF REPAIR Right 09/29/2009    Dr. Arik Harris   • TOTAL KNEE ARTHROPLASTY Right 5/28/2019    Procedure: RIGHT TOTAL KNEE ARTHROPLASTY;  Surgeon: Bola Ramires MD;  Location: [facility] MAIN OR;  Service: Orthopedics       FAMILY HISTORY  Family History   Problem Relation Age of Onset   • Diabetes Mother    • Tuberculosis Father    • Breast cancer Sister    • Bone cancer Sister    • Lung cancer Brother    • Malig Hyperthermia Neg Hx        SOCIAL HISTORY  Social History     Socioeconomic History   • Marital status:      Spouse name: Not on file   • Number of children: Not on file   • Years of education: Not on file   • Highest education level: Not on file   Tobacco Use   • Smoking status: Current Every Day Smoker     Packs/day: 0.00     Types: Cigars   • Smokeless tobacco: Never Used   • Tobacco comment: 2 CIGARS PER DAY, SOMETIMES LESS   Substance and Sexual Activity   • Alcohol use: No     Frequency: Never   • Drug use: No   • Sexual activity: Defer       ALLERGIES  Codeine    REVIEW OF SYSTEMS  Review of Systems   Constitutional: Negative for activity change, appetite change and fever.   HENT: Negative for congestion and sore throat.    Eyes: Negative.    Respiratory: Negative for cough and shortness of breath.    Cardiovascular: Negative for chest pain and leg swelling.   Gastrointestinal: Positive for abdominal pain, diarrhea, nausea and vomiting. Negative for blood in stool.   Endocrine: Negative.    Genitourinary: Negative for decreased urine volume and  dysuria.   Musculoskeletal: Negative for neck pain.   Skin: Negative for rash and wound.   Allergic/Immunologic: Negative.    Neurological: Negative for weakness, numbness and headaches.   Hematological: Negative.    Psychiatric/Behavioral: Negative.    All other systems reviewed and are negative.      PHYSICAL EXAM  ED Triage Vitals [09/14/19 1116]   Temp Heart Rate Resp BP SpO2   97.6 °F (36.4 °C) 90 17 -- 99 %      Temp src Heart Rate Source Patient Position BP Location FiO2 (%)   Tympanic -- -- -- --       Physical Exam   Constitutional: No distress.   HENT:   Head: Normocephalic and atraumatic.   Eyes: Conjunctivae are normal.   Neck: Normal range of motion.   Cardiovascular: Normal rate and regular rhythm.   Pulmonary/Chest: Breath sounds normal. No respiratory distress.   Abdominal: Soft. Bowel sounds are normal. He exhibits no distension. There is no tenderness.   Musculoskeletal: He exhibits no edema or tenderness.   Neurological: He is alert.   Skin: No rash noted.   Nursing note and vitals reviewed.      LAB RESULTS  Lab Results (last 24 hours)     Procedure Component Value Units Date/Time    CBC & Differential [[id number]] Collected:  09/14/19 1141    Specimen:  Blood Updated:  09/14/19 1159    Narrative:       The following orders were created for panel order CBC & Differential.  Procedure                               Abnormality         Status                     ---------                               -----------         ------                     CBC Auto Differential[[id number]]        Abnormal            Final result                 Please view results for these tests on the individual orders.    Comprehensive Metabolic Panel [[id number]] Collected:  09/14/19 1141    Specimen:  Blood Updated:  09/14/19 1216     Glucose 99 mg/dL      BUN 18 mg/dL      Creatinine 0.93 mg/dL      Sodium 140 mmol/L      Potassium 4.4 mmol/L      Chloride 104 mmol/L      CO2 24.4 mmol/L      Calcium 8.6 mg/dL      Total  Protein 7.0 g/dL      Albumin 4.00 g/dL      ALT (SGPT) 18 U/L      AST (SGOT) 14 U/L      Alkaline Phosphatase 96 U/L      Total Bilirubin 0.7 mg/dL      eGFR Non African Amer 80 mL/min/1.73      Globulin 3.0 gm/dL      A/G Ratio 1.3 g/dL      BUN/Creatinine Ratio 19.4     Anion Gap 11.6 mmol/L     Narrative:       GFR Normal >60  Chronic Kidney Disease <60  Kidney Failure <15    Lipase [[id number]]  (Normal) Collected:  09/14/19 1141    Specimen:  Blood Updated:  09/14/19 1216     Lipase 29 U/L     Lactic Acid, Plasma [[id number]]  (Normal) Collected:  09/14/19 1141    Specimen:  Blood Updated:  09/14/19 1205     Lactate 0.8 mmol/L     CBC Auto Differential [[id number]]  (Abnormal) Collected:  09/14/19 1141    Specimen:  Blood Updated:  09/14/19 1159     WBC 7.28 10*3/mm3      RBC 4.93 10*6/mm3      Hemoglobin 13.2 g/dL      Hematocrit 42.5 %      MCV 86.2 fL      MCH 26.8 pg      MCHC 31.1 g/dL      RDW 13.4 %      RDW-SD 42.0 fl      MPV 9.8 fL      Platelets 224 10*3/mm3      Neutrophil % 69.9 %      Lymphocyte % 19.5 %      Monocyte % 7.6 %      Eosinophil % 2.1 %      Basophil % 0.5 %      Immature Grans % 0.4 %      Neutrophils, Absolute 5.09 10*3/mm3      Lymphocytes, Absolute 1.42 10*3/mm3      Monocytes, Absolute 0.55 10*3/mm3      Eosinophils, Absolute 0.15 10*3/mm3      Basophils, Absolute 0.04 10*3/mm3      Immature Grans, Absolute 0.03 10*3/mm3      nRBC 0.0 /100 WBC     Urinalysis With Microscopic If Indicated (No Culture) - Urine, Clean Catch [[id number]]  (Normal) Collected:  09/14/19 1142    Specimen:  Urine, Clean Catch Updated:  09/14/19 1205     Color, UA Yellow     Appearance, UA Clear     pH, UA <=5.0     Specific Gravity, UA >=1.030     Glucose, UA Negative     Ketones, UA Negative     Bilirubin, UA Negative     Blood, UA Negative     Protein, UA Negative     Leuk Esterase, UA Negative     Nitrite, UA Negative     Urobilinogen, UA 0.2 E.U./dL    Narrative:       Urine microscopic not indicated.           I ordered the above labs and reviewed the results    RADIOLOGY  CT Abdomen Pelvis With Contrast              I ordered the above noted radiological studies. Interpreted by radiologist. Discussed with radiologist (Baron). Reviewed by me in PACS.       PROCEDURES  Procedures      PROGRESS AND CONSULTS     1137: Blood work, UA, CT abd/pelvis ordered. Fluid bolus ordered.     1257: Spoke with Dr. Draper (radiologist) who states CT abd/pelvis shows sigmoid diverticulosis but no evidence of diverticulitis, appendix normal, otherwise negative acute.     1305: Patient rechecked. Updated patient on results of ED workup. Discussed plan for discharge home with prescription for flagyl. Instructed patient to follow up with PMD or return to ED if symptoms worsen. Patient voiced understanding and is agreeable to plan.     MEDICAL DECISION MAKING  Results were reviewed/discussed with the patient and they were also made aware of online access. Pt also made aware that some labs, such as cultures, will not be resulted during ER visit and follow up with PMD is necessary.     MDM  Number of Diagnoses or Management Options     Amount and/or Complexity of Data Reviewed  Clinical lab tests: ordered and reviewed  Tests in the radiology section of CPT®: ordered and reviewed           DIAGNOSIS  Final diagnoses:   Generalized abdominal pain   Diarrhea, unspecified type   Diverticulosis of large intestine without hemorrhage       DISPOSITION  DISCHARGE    Patient discharged in stable condition.    Reviewed implications of results, diagnosis, meds, responsibility to follow up, warning signs and symptoms of possible worsening, potential complications and reasons to return to ER.    Patient/Family voiced understanding of above instructions.    Discussed plan for discharge, as there is no emergent indication for admission. Patient referred to primary care provider for BP management due to today's BP. Pt/family is agreeable and  understands need for follow up and repeat testing.  Pt is aware that discharge does not mean that nothing is wrong but it indicates no emergency is present that requires admission and they must continue care with follow-up as given below or physician of their choice.     FOLLOW-UP  Daniel Mahan MD  [address] [id number] [phone]    Schedule an appointment as soon as possible for a visit   If symptoms worsen or do not improve         Medication List      New Prescriptions    metroNIDAZOLE 500 MG tablet  Commonly known as:  FLAGYL  Take 1 tablet by mouth 3 (Three) Times a Day.            Latest Documented Vital Signs:  As of 1:07 PM  BP-   HR- 90 Temp- 97.6 °F (36.4 °C) (Tympanic) O2 sat- 99%    --  Documentation assistance provided by spencer Briggs for Dr. Geronimo Welsh MD.  Information recorded by the scribe was done at my direction and has been verified and validated by me.              Romi Briggs  09/14/19 [id number]       Geronimo Welsh MD  09/14/19 [id number]

## 2019-09-14 NOTE — ED TRIAGE NOTES
Pt c/o generalized abd pain, N/V/D since Tuesday. Pt arrives aaox4, abc's intact. Seen at urgent care PTA.

## 2021-03-15 ENCOUNTER — BULK ORDERING (OUTPATIENT)
Dept: CASE MANAGEMENT | Facility: OTHER | Age: 71
End: 2021-03-15

## 2021-03-15 DIAGNOSIS — Z23 IMMUNIZATION DUE: ICD-10-CM

## 2021-08-09 ENCOUNTER — HOSPITAL ENCOUNTER (OUTPATIENT)
Facility: HOSPITAL | Age: 71
Setting detail: OBSERVATION
Discharge: HOME | End: 2021-08-11
Attending: EMERGENCY MEDICINE | Admitting: HOSPITALIST

## 2021-08-09 ENCOUNTER — APPOINTMENT (OUTPATIENT)
Dept: CT IMAGING | Facility: HOSPITAL | Age: 71
End: 2021-08-09

## 2021-08-09 ENCOUNTER — APPOINTMENT (OUTPATIENT)
Dept: GENERAL RADIOLOGY | Facility: HOSPITAL | Age: 71
End: 2021-08-09

## 2021-08-09 DIAGNOSIS — R20.2 PARESTHESIAS: ICD-10-CM

## 2021-08-09 DIAGNOSIS — R20.0 FACIAL NUMBNESS: Primary | ICD-10-CM

## 2021-08-09 DIAGNOSIS — R29.898 LEG WEAKNESS, BILATERAL: ICD-10-CM

## 2021-08-09 LAB
ALBUMIN SERPL-MCNC: 4.1 G/DL (ref 3.5–5.2)
ALBUMIN/GLOB SERPL: 1.6 G/DL
ALP SERPL-CCNC: 80 U/L (ref 39–117)
ALT SERPL W P-5'-P-CCNC: 14 U/L (ref 1–41)
ANION GAP SERPL CALCULATED.3IONS-SCNC: 8.9 MMOL/L (ref 5–15)
AST SERPL-CCNC: 14 U/L (ref 1–40)
BASOPHILS # BLD AUTO: 0.06 10*3/MM3 (ref 0–0.2)
BASOPHILS NFR BLD AUTO: 0.9 % (ref 0–1.5)
BILIRUB SERPL-MCNC: 0.3 MG/DL (ref 0–1.2)
BILIRUB UR QL STRIP: NEGATIVE
BUN SERPL-MCNC: 15 MG/DL (ref 8–23)
BUN/CREAT SERPL: 16.7 (ref 7–25)
CALCIUM SPEC-SCNC: 9.2 MG/DL (ref 8.6–10.5)
CHLORIDE SERPL-SCNC: 103 MMOL/L (ref 98–107)
CLARITY UR: CLEAR
CO2 SERPL-SCNC: 25.1 MMOL/L (ref 22–29)
COLOR UR: YELLOW
CREAT SERPL-MCNC: 0.9 MG/DL (ref 0.76–1.27)
DEPRECATED RDW RBC AUTO: 39.1 FL (ref 37–54)
EOSINOPHIL # BLD AUTO: 0.3 10*3/MM3 (ref 0–0.4)
EOSINOPHIL NFR BLD AUTO: 4.5 % (ref 0.3–6.2)
ERYTHROCYTE [DISTWIDTH] IN BLOOD BY AUTOMATED COUNT: 12.9 % (ref 12.3–15.4)
GFR SERPL CREATININE-BSD FRML MDRD: 83 ML/MIN/1.73
GLOBULIN UR ELPH-MCNC: 2.6 GM/DL
GLUCOSE SERPL-MCNC: 69 MG/DL (ref 65–99)
GLUCOSE UR STRIP-MCNC: NEGATIVE MG/DL
HCT VFR BLD AUTO: 39.8 % (ref 37.5–51)
HGB BLD-MCNC: 13.4 G/DL (ref 13–17.7)
HGB UR QL STRIP.AUTO: NEGATIVE
HOLD SPECIMEN: NORMAL
HOLD SPECIMEN: NORMAL
IMM GRANULOCYTES # BLD AUTO: 0.02 10*3/MM3 (ref 0–0.05)
IMM GRANULOCYTES NFR BLD AUTO: 0.3 % (ref 0–0.5)
KETONES UR QL STRIP: NEGATIVE
LEUKOCYTE ESTERASE UR QL STRIP.AUTO: NEGATIVE
LYMPHOCYTES # BLD AUTO: 1.7 10*3/MM3 (ref 0.7–3.1)
LYMPHOCYTES NFR BLD AUTO: 25.6 % (ref 19.6–45.3)
MAGNESIUM SERPL-MCNC: 1.9 MG/DL (ref 1.6–2.4)
MCH RBC QN AUTO: 28.3 PG (ref 26.6–33)
MCHC RBC AUTO-ENTMCNC: 33.7 G/DL (ref 31.5–35.7)
MCV RBC AUTO: 84 FL (ref 79–97)
MONOCYTES # BLD AUTO: 0.53 10*3/MM3 (ref 0.1–0.9)
MONOCYTES NFR BLD AUTO: 8 % (ref 5–12)
NEUTROPHILS NFR BLD AUTO: 4.04 10*3/MM3 (ref 1.7–7)
NEUTROPHILS NFR BLD AUTO: 60.7 % (ref 42.7–76)
NITRITE UR QL STRIP: NEGATIVE
NRBC BLD AUTO-RTO: 0 /100 WBC (ref 0–0.2)
PH UR STRIP.AUTO: 5.5 [PH] (ref 5–8)
PLATELET # BLD AUTO: 200 10*3/MM3 (ref 140–450)
PMV BLD AUTO: 10.5 FL (ref 6–12)
POTASSIUM SERPL-SCNC: 4.4 MMOL/L (ref 3.5–5.2)
PROT SERPL-MCNC: 6.7 G/DL (ref 6–8.5)
PROT UR QL STRIP: NEGATIVE
QT INTERVAL: 415 MS
RBC # BLD AUTO: 4.74 10*6/MM3 (ref 4.14–5.8)
SARS-COV-2 ORF1AB RESP QL NAA+PROBE: NOT DETECTED
SODIUM SERPL-SCNC: 137 MMOL/L (ref 136–145)
SP GR UR STRIP: 1.02 (ref 1–1.03)
TROPONIN T SERPL-MCNC: <0.01 NG/ML (ref 0–0.03)
UROBILINOGEN UR QL STRIP: NORMAL
WBC # BLD AUTO: 6.65 10*3/MM3 (ref 3.4–10.8)
WHOLE BLOOD HOLD SPECIMEN: NORMAL

## 2021-08-09 PROCEDURE — G0378 HOSPITAL OBSERVATION PER HR: HCPCS

## 2021-08-09 PROCEDURE — 71045 X-RAY EXAM CHEST 1 VIEW: CPT

## 2021-08-09 PROCEDURE — 93005 ELECTROCARDIOGRAM TRACING: CPT

## 2021-08-09 PROCEDURE — 96361 HYDRATE IV INFUSION ADD-ON: CPT

## 2021-08-09 PROCEDURE — 81003 URINALYSIS AUTO W/O SCOPE: CPT

## 2021-08-09 PROCEDURE — 93005 ELECTROCARDIOGRAM TRACING: CPT | Performed by: EMERGENCY MEDICINE

## 2021-08-09 PROCEDURE — C9803 HOPD COVID-19 SPEC COLLECT: HCPCS

## 2021-08-09 PROCEDURE — 85025 COMPLETE CBC W/AUTO DIFF WBC: CPT

## 2021-08-09 PROCEDURE — 93010 ELECTROCARDIOGRAM REPORT: CPT | Performed by: INTERNAL MEDICINE

## 2021-08-09 PROCEDURE — 83735 ASSAY OF MAGNESIUM: CPT

## 2021-08-09 PROCEDURE — 80053 COMPREHEN METABOLIC PANEL: CPT

## 2021-08-09 PROCEDURE — U0004 COV-19 TEST NON-CDC HGH THRU: HCPCS | Performed by: EMERGENCY MEDICINE

## 2021-08-09 PROCEDURE — 84484 ASSAY OF TROPONIN QUANT: CPT

## 2021-08-09 PROCEDURE — 36415 COLL VENOUS BLD VENIPUNCTURE: CPT

## 2021-08-09 PROCEDURE — 70450 CT HEAD/BRAIN W/O DYE: CPT

## 2021-08-09 PROCEDURE — U0005 INFEC AGEN DETEC AMPLI PROBE: HCPCS | Performed by: EMERGENCY MEDICINE

## 2021-08-09 PROCEDURE — 99284 EMERGENCY DEPT VISIT MOD MDM: CPT

## 2021-08-09 RX ORDER — SODIUM CHLORIDE 0.9 % (FLUSH) 0.9 %
10 SYRINGE (ML) INJECTION AS NEEDED
Status: DISCONTINUED | OUTPATIENT
Start: 2021-08-09 | End: 2021-08-11 | Stop reason: HOSPADM

## 2021-08-09 RX ORDER — TAMSULOSIN HYDROCHLORIDE 0.4 MG/1
0.4 CAPSULE ORAL NIGHTLY
Status: DISCONTINUED | OUTPATIENT
Start: 2021-08-09 | End: 2021-08-11 | Stop reason: HOSPADM

## 2021-08-09 RX ORDER — SODIUM CHLORIDE 0.9 % (FLUSH) 0.9 %
10 SYRINGE (ML) INJECTION EVERY 12 HOURS SCHEDULED
Status: DISCONTINUED | OUTPATIENT
Start: 2021-08-09 | End: 2021-08-11 | Stop reason: HOSPADM

## 2021-08-09 RX ORDER — NITROGLYCERIN 0.4 MG/1
0.4 TABLET SUBLINGUAL
Status: DISCONTINUED | OUTPATIENT
Start: 2021-08-09 | End: 2021-08-11 | Stop reason: HOSPADM

## 2021-08-09 RX ORDER — ONDANSETRON 2 MG/ML
4 INJECTION INTRAMUSCULAR; INTRAVENOUS EVERY 6 HOURS PRN
Status: DISCONTINUED | OUTPATIENT
Start: 2021-08-09 | End: 2021-08-11 | Stop reason: HOSPADM

## 2021-08-09 RX ORDER — ACETAMINOPHEN 160 MG/5ML
650 SOLUTION ORAL EVERY 4 HOURS PRN
Status: DISCONTINUED | OUTPATIENT
Start: 2021-08-09 | End: 2021-08-11 | Stop reason: HOSPADM

## 2021-08-09 RX ORDER — ACETAMINOPHEN 325 MG/1
650 TABLET ORAL EVERY 4 HOURS PRN
Status: DISCONTINUED | OUTPATIENT
Start: 2021-08-09 | End: 2021-08-11 | Stop reason: HOSPADM

## 2021-08-09 RX ORDER — PANTOPRAZOLE SODIUM 40 MG/1
40 TABLET, DELAYED RELEASE ORAL EVERY MORNING
Status: DISCONTINUED | OUTPATIENT
Start: 2021-08-10 | End: 2021-08-11 | Stop reason: HOSPADM

## 2021-08-09 RX ORDER — ACETAMINOPHEN 650 MG/1
650 SUPPOSITORY RECTAL EVERY 4 HOURS PRN
Status: DISCONTINUED | OUTPATIENT
Start: 2021-08-09 | End: 2021-08-11 | Stop reason: HOSPADM

## 2021-08-09 RX ORDER — SODIUM CHLORIDE 9 MG/ML
100 INJECTION, SOLUTION INTRAVENOUS CONTINUOUS
Status: DISCONTINUED | OUTPATIENT
Start: 2021-08-09 | End: 2021-08-11 | Stop reason: HOSPADM

## 2021-08-09 RX ADMIN — SODIUM CHLORIDE, PRESERVATIVE FREE 10 ML: 5 INJECTION INTRAVENOUS at 22:19

## 2021-08-09 RX ADMIN — SODIUM CHLORIDE 100 ML/HR: 9 INJECTION, SOLUTION INTRAVENOUS at 22:19

## 2021-08-09 RX ADMIN — ACETAMINOPHEN 650 MG: 325 TABLET, FILM COATED ORAL at 22:19

## 2021-08-09 NOTE — ED NOTES
"Pt sent over by  for complaints of constant tingling/burning/coldness in bilateral legs worsening over the past week. Pt also states he's been having intermittent right facial tingling and unsteady gait that is usually the same time he starts to have a headache. Pt states he has had a \"pinstroke\" in the past, no blood thinner use. Pt and RN wearing mask upon triage.        Lisa Alas RN  08/09/21 [id number]    "

## 2021-08-09 NOTE — ED PROVIDER NOTES
EMERGENCY DEPARTMENT ENCOUNTER    Room Number:  S522/1  PCP: Daniel Mahan MD  Historian: Patient  History Limited By: Nothing      HPI  Chief Complaint: Paresthesias  Context: Feliciano Noguera is a 71 y.o. male who presents to the ED c/o paresthesias.  Patient states symptoms started about a week ago.  States started with burning and tingling in both legs from knees down.  States it has gone up slightly to his mid thigh.  Patient states also has been having tingling to right face.  Has been having increasing difficulty walking because of weakness.  Patient has had no back pain or tenderness.  Patient has had no falls or trauma.  Patient contacted primary doctor and urgent care and was sent in for evaluation.  Has had no Covid or other upper respiratory symptoms.      Location: Bilateral lower extremity  Radiation: None  Character: Burning  Duration: 1 week  Severity: Moderate  Progression: Worsening  Aggravating Factors: Nothing  Alleviating Factors: Nothing        MEDICAL RECORD REVIEW    Patient was seen at urgent care and sent here for evaluation          PAST MEDICAL HISTORY  Active Ambulatory Problems     Diagnosis Date Noted   • Precordial pain 06/28/2017   • Hyperglycemia 06/28/2017   • Fatigue 06/28/2017   • Snoring 06/28/2017   • Uncontrolled daytime somnolence 06/28/2017   • Benign prostatic hyperplasia without lower urinary tract symptoms 06/28/2017   • Tobacco abuse 06/28/2017   • Right upper quadrant abdominal pain 08/03/2017   • Hematochezia 11/06/2018   • Epigastric pain 11/06/2018   • Diarrhea 11/06/2018   • OA (osteoarthritis) of knee 05/28/2019     Resolved Ambulatory Problems     Diagnosis Date Noted   • Generalized abdominal pain 06/28/2017   • Calculus of gallbladder without cholecystitis without obstruction 07/28/2017     Past Medical History:   Diagnosis Date   • Arthritis    • Enlarged prostate    • GERD (gastroesophageal reflux disease)    • [place] (hard of hearing)    • Knee pain, right           PAST SURGICAL HISTORY  Past Surgical History:   Procedure Laterality Date   • CHOLECYSTECTOMY WITH INTRAOPERATIVE CHOLANGIOGRAM N/A 8/1/2017    Procedure: CHOLECYSTECTOMY LAPAROSCOPIC INTRAOPERATIVE CHOLANGIOGRAM;  Surgeon: Bola Patel MD;  Location:  [facility] [place] [facility] [place];  Service:    • COLONOSCOPY N/A 09/24/2009    Dr. Jamison Johnson   • COLONOSCOPY N/A 11/14/2018    Procedure: COLONOSCOPY into cecum with biopsy;  Surgeon: Bola Patel MD;  [facility];  Service: General   • ENDOSCOPY N/A 12/15/2008    Dr. Jamison Johnson   • ENDOSCOPY N/A 11/14/2018    Procedure: ESOPHAGOGASTRODUODENOSCOPY with biopsy;  Surgeon: Bola Patel MD;  [facility];  Service: General   • KNEE ARTHROSCOPY Left    • PROSTATE BIOPSY     • ROTATOR CUFF REPAIR Right 09/29/2009    Dr. Arik Harris   • TOTAL KNEE ARTHROPLASTY Right 5/28/2019    Procedure: RIGHT TOTAL KNEE ARTHROPLASTY;  Surgeon: Bola Ramires MD;  Location: [facility] OR;  Service: Orthopedics         FAMILY HISTORY  Family History   Problem Relation Age of Onset   • Diabetes Mother    • Tuberculosis Father    • Breast cancer Sister    • Bone cancer Sister    • Lung cancer Brother    • Malig Hyperthermia Neg Hx          SOCIAL HISTORY  Social History     Socioeconomic History   • Marital status:      Spouse name: Not on file   • Number of children: Not on file   • Years of education: Not on file   • Highest education level: Not on file   Tobacco Use   • Smoking status: Current Every Day Smoker     Packs/day: 0.00     Types: Cigars   • Smokeless tobacco: Never Used   • Tobacco comment: 2 CIGARS PER DAY, SOMETIMES LESS   Vaping Use   • Vaping Use: Never used   Substance and Sexual Activity   • Alcohol use: No   • Drug use: No   • Sexual activity: Defer         ALLERGIES  Codeine        REVIEW OF SYSTEMS  Review of Systems   Constitutional: Negative for activity change, appetite change and fever.   HENT: Negative  for congestion and sore throat.    Eyes: Negative.    Respiratory: Negative for cough and shortness of breath.    Cardiovascular: Negative for chest pain and leg swelling.   Gastrointestinal: Negative for abdominal pain, diarrhea and vomiting.   Endocrine: Negative.    Genitourinary: Negative for decreased urine volume and dysuria.   Musculoskeletal: Positive for gait problem. Negative for neck pain.   Skin: Negative for rash and wound.   Allergic/Immunologic: Negative.    Neurological: Positive for weakness and numbness. Negative for headaches.   Hematological: Negative.    Psychiatric/Behavioral: Negative.    All other systems reviewed and are negative.           PHYSICAL EXAM  ED Triage Vitals   Temp Heart Rate Resp BP SpO2   08/09/21 1410 08/09/21 1410 08/09/21 1410 08/09/21 1446 08/09/21 1410   97.5 °F (36.4 °C) 89 16 128/85 99 %      Temp src Heart Rate Source Patient Position BP Location FiO2 (%)   08/09/21 1410 08/09/21 1410 -- -- --   Tympanic Monitor          Physical Exam  Vitals and nursing note reviewed.   Constitutional:       General: He is not in acute distress.  HENT:      Head: Normocephalic and atraumatic.   Eyes:      Pupils: Pupils are equal, round, and reactive to light.   Cardiovascular:      Rate and Rhythm: Normal rate and regular rhythm.      Heart sounds: Normal heart sounds.   Pulmonary:      Effort: Pulmonary effort is normal. No respiratory distress.      Breath sounds: Normal breath sounds.   Abdominal:      Palpations: Abdomen is soft.      Tenderness: There is no abdominal tenderness. There is no guarding or rebound.   Musculoskeletal:         General: Normal range of motion.      Cervical back: Normal range of motion and neck supple.   Skin:     General: Skin is warm and dry.   Neurological:      Mental Status: He is alert and oriented to person, place, and time.      Sensory: Sensation is intact. No sensory deficit.      Motor: Weakness present.      Comments: Patient does have  trouble holding legs up for 5 seconds.  Patient does have normal reflexes.  Patient has some numbness to right face.   Psychiatric:         Mood and Affect: Mood and affect normal.       Patient was wearing a face mask when I entered the room and they continued to wear a mask throughout their stay in the ED.  I wore PPE, including  gloves, face mask with shield or face mask with goggles whenever I was in the room with patient.       LAB RESULTS  Recent Results (from the past 24 hour(s))   Comprehensive Metabolic Panel    Collection Time: 08/09/21  2:51 PM    Specimen: Blood   Result Value Ref Range    Glucose 69 65 - 99 mg/dL    BUN 15 8 - 23 mg/dL    Creatinine 0.90 0.76 - 1.27 mg/dL    Sodium 137 136 - 145 mmol/L    Potassium 4.4 3.5 - 5.2 mmol/L    Chloride 103 98 - 107 mmol/L    CO2 25.1 22.0 - 29.0 mmol/L    Calcium 9.2 8.6 - 10.5 mg/dL    Total Protein 6.7 6.0 - 8.5 g/dL    Albumin 4.10 3.50 - 5.20 g/dL    ALT (SGPT) 14 1 - 41 U/L    AST (SGOT) 14 1 - 40 U/L    Alkaline Phosphatase 80 39 - 117 U/L    Total Bilirubin 0.3 0.0 - 1.2 mg/dL    eGFR Non African Amer 83 >60 mL/min/1.73    Globulin 2.6 gm/dL    A/G Ratio 1.6 g/dL    BUN/Creatinine Ratio 16.7 7.0 - 25.0    Anion Gap 8.9 5.0 - 15.0 mmol/L   Troponin    Collection Time: 08/09/21  2:51 PM    Specimen: Blood   Result Value Ref Range    Troponin T <0.010 0.000 - 0.030 ng/mL   Magnesium    Collection Time: 08/09/21  2:51 PM    Specimen: Blood   Result Value Ref Range    Magnesium 1.9 1.6 - 2.4 mg/dL   Green Top (Gel)    Collection Time: 08/09/21  2:51 PM   Result Value Ref Range    Extra Tube Hold for add-ons.    Lavender Top    Collection Time: 08/09/21  2:51 PM   Result Value Ref Range    Extra Tube hold for add-on    Gold Top - SST    Collection Time: 08/09/21  2:51 PM   Result Value Ref Range    Extra Tube Hold for add-ons.    CBC Auto Differential    Collection Time: 08/09/21  2:51 PM    Specimen: Blood   Result Value Ref Range    WBC 6.65 3.40 - 10.80  10*3/mm3    RBC 4.74 4.14 - 5.80 10*6/mm3    Hemoglobin 13.4 13.0 - 17.7 g/dL    Hematocrit 39.8 37.5 - 51.0 %    MCV 84.0 79.0 - 97.0 fL    MCH 28.3 26.6 - 33.0 pg    MCHC 33.7 31.5 - 35.7 g/dL    RDW 12.9 12.3 - 15.4 %    RDW-SD 39.1 37.0 - 54.0 fl    MPV 10.5 6.0 - 12.0 fL    Platelets 200 140 - 450 10*3/mm3    Neutrophil % 60.7 42.7 - 76.0 %    Lymphocyte % 25.6 19.6 - 45.3 %    Monocyte % 8.0 5.0 - 12.0 %    Eosinophil % 4.5 0.3 - 6.2 %    Basophil % 0.9 0.0 - 1.5 %    Immature Grans % 0.3 0.0 - 0.5 %    Neutrophils, Absolute 4.04 1.70 - 7.00 10*3/mm3    Lymphocytes, Absolute 1.70 0.70 - 3.10 10*3/mm3    Monocytes, Absolute 0.53 0.10 - 0.90 10*3/mm3    Eosinophils, Absolute 0.30 0.00 - 0.40 10*3/mm3    Basophils, Absolute 0.06 0.00 - 0.20 10*3/mm3    Immature Grans, Absolute 0.02 0.00 - 0.05 10*3/mm3    nRBC 0.0 0.0 - 0.2 /100 WBC   Urinalysis With Microscopic If Indicated (No Culture) - Urine, Clean Catch    Collection Time: 08/09/21  2:53 PM    Specimen: Urine, Clean Catch   Result Value Ref Range    Color, UA Yellow Yellow, Straw    Appearance, UA Clear Clear    pH, UA 5.5 5.0 - 8.0    Specific Gravity, UA 1.018 1.005 - 1.030    Glucose, UA Negative Negative    Ketones, UA Negative Negative    Bilirubin, UA Negative Negative    Blood, UA Negative Negative    Protein, UA Negative Negative    Leuk Esterase, UA Negative Negative    Nitrite, UA Negative Negative    Urobilinogen, UA 0.2 E.U./dL 0.2 - 1.0 E.U./dL   ECG 12 Lead    Collection Time: 08/09/21  3:32 PM   Result Value Ref Range    QT Interval 415 ms   COVID-19,APTIMA PANTHER(ANDRÉS),[facility], NP/OP SWAB IN UTM/VTM/SALINE TRANSPORT MEDIA,24 HR TAT - Swab, Nasopharynx    Collection Time: 08/09/21  5:12 PM    Specimen: Nasopharynx; Swab   Result Value Ref Range    COVID19 Not Detected Not Detected - Ref. Range       Ordered the above labs and reviewed the results.        RADIOLOGY  CT Head Without Contrast   Final Result   No acute process identified. Further  evaluation could be   performed with a MRI examination of the brain as indicated.               Radiation dose reduction techniques were utilized, including automated   exposure control and exposure modulation based on body size.       This report was finalized on 8/9/2021 5:07 PM by Dr. Duglas Baker M.D.          XR Chest 1 View   Final Result      MRI Brain With & Without Contrast    (Results Pending)        Ordered the above noted radiological studies. Reviewed by me in PACS.         PROCEDURES  Procedures      EKG:          EKG time: 1532  Rhythm/Rate: Normal sinus rhythm 66  P waves and TX: Normal P waves  QRS, axis: Right bundle branch block  ST and T waves: Nonspecific ST-T wave    Interpreted Contemporaneously by me, independently viewed  Unchanged compared to prior 5/24/2019        MEDICATIONS GIVEN IN ER  Medications   sodium chloride 0.9 % flush 10 mL (has no administration in time range)   sodium chloride 0.9 % flush 10 mL (has no administration in time range)   tamsulosin (FLOMAX) 24 hr capsule 0.4 mg (0.4 mg Oral Not Given 8/9/21 2220)   pantoprazole (PROTONIX) EC tablet 40 mg (has no administration in time range)   sodium chloride 0.9 % flush 10 mL (10 mL Intravenous Given 8/9/21 2219)   sodium chloride 0.9 % flush 10 mL (has no administration in time range)   nitroglycerin (NITROSTAT) SL tablet 0.4 mg (has no administration in time range)   sodium chloride 0.9 % infusion (100 mL/hr Intravenous New Bag 8/9/21 2219)   ondansetron (ZOFRAN) injection 4 mg (has no administration in time range)   acetaminophen (TYLENOL) tablet 650 mg (650 mg Oral Given 8/9/21 2219)     Or   acetaminophen (TYLENOL) 160 MG/5ML solution 650 mg ( Oral Not Given:  See Alt 8/9/21 2219)     Or   acetaminophen (TYLENOL) suppository 650 mg ( Rectal Not Given:  See Alt 8/9/21 2219)             PROGRESS AND CONSULTS  ED Course as of Aug 09 2251   Mon Aug 09, 2021   2699 16:49 EDT  Patient presents for weakness and paresthesias both  lower extremities.  The paresthesias seem to be getting worse and increasing up the leg.  Patient does have weakness in both legs.  Patient also complaining of numbness to right side of face.  Patient discussed with stroke neurologist.  Not a TPA candidate as symptoms are outside of TPA window.  Patient will be admitted for further evaluation.  Patient does have reflexes so doubt Guillain-Barré.  Patient discussed with Dr. Be and will be admitted.    [SL]      ED Course User Index  [SL] Christiano Pickett MD           MEDICAL DECISION MAKING      MDM  Number of Diagnoses or Management Options     Amount and/or Complexity of Data Reviewed  Clinical lab tests: reviewed and ordered (White blood cell count 6.6)  Tests in the radiology section of CPT®: reviewed and ordered (Head CT negative)  Discuss the patient with other providers: yes (Discussed with stroke neurologist and then discussed with Dr. Be)               DIAGNOSIS  Final diagnoses:   Facial numbness   Leg weakness, bilateral   Paresthesias           DISPOSITION  admit        Latest Documented Vital Signs:  As of 22:51 EDT  BP- 131/80 HR- 81 Temp- 97.4 °F (36.3 °C) (Oral) O2 sat- 95%                           Christiano Pickett MD  08/09/21 9539

## 2021-08-09 NOTE — ED TRIAGE NOTES
Pt complains of numbness and tingling in his bilateral legs intermittently for the last week. Also complains of some tingling in his right face and balance issues. States that he went to an [facility] this morning and was told to come to the ER. Patient masked at arrival and triage staff wore all appropriate PPE during entire encounter with patient.

## 2021-08-09 NOTE — H&P
Internal medicine history and physical  INTERNAL MEDICINE   [facility]       Patient Identification:  Name: Feliciano Noguera  Age: 71 y.o.  Sex: male  :  1950  MRN: [id number]                   Primary Care Physician: Daniel Mahan MD                               Date of admission:2021    Chief Complaint: Woke up with bilateral lower extremity pain and burning up to his mid thigh area.  Unsteadiness in the last few days.    History of Present Illness:   Patient is a 71-year-old male whose past medical history as noted below including history of shingles and episode of right facial postherpetic neuralgia with symptoms occurring almost at the frequency of once a week and comes and goes away was in his usual state of his health until 2 3 months ago when he started noticing episodes of blurry vision in which his right eye becomes fuzzy with improvement on its own.  He also was otherwise doing fine besides the symptom until a week ago when he woke up with both legs burning and very sensitive.  It has involved all the way to his mid thigh.  Patient denies any progression.  He did notice unsteadiness with this and potential for fall.  Because he was not getting any better and becoming unsteady he decided to go to the immediate care center and from there he was sent to the emergency room for further evaluation.      Past Medical History:  Past Medical History:   Diagnosis Date   • Arthritis    • Enlarged prostate    • GERD (gastroesophageal reflux disease)    • [place] (hard of hearing)    • Knee pain, right      Past Surgical History:  Past Surgical History:   Procedure Laterality Date   • CHOLECYSTECTOMY WITH INTRAOPERATIVE CHOLANGIOGRAM N/A 2017    Procedure: CHOLECYSTECTOMY LAPAROSCOPIC INTRAOPERATIVE CHOLANGIOGRAM;  Surgeon: Bola Patel MD;  [place] [facility] [place] [facility] [place]  Service:    • COLONOSCOPY N/A 2009    Dr. Jamison Johnson   • COLONOSCOPY N/A 2018    Procedure:  COLONOSCOPY into cecum with biopsy;  Surgeon: Bola Patel MD;  Location: [facility] ENDOSCOPY;  Service: General   • ENDOSCOPY N/A 12/15/2008    Dr. Jamison Johnson   • ENDOSCOPY N/A 11/14/2018    Procedure: ESOPHAGOGASTRODUODENOSCOPY with biopsy;  Surgeon: Bola Patel MD;  Location: [facility] ENDOSCOPY;  Service: General   • KNEE ARTHROSCOPY Left    • PROSTATE BIOPSY     • ROTATOR CUFF REPAIR Right 09/29/2009    Dr. Arik Harris   • TOTAL KNEE ARTHROPLASTY Right 5/28/2019    Procedure: RIGHT TOTAL KNEE ARTHROPLASTY;  Surgeon: Bola Ramires MD;  Location: [facility] MAIN OR;  Service: Orthopedics      Home Meds:  (Not in a hospital admission)    Current Meds:     Current Facility-Administered Medications:   •  sodium chloride 0.9 % flush 10 mL, 10 mL, Intravenous, PRN, Christiano Pickett MD  •  [COMPLETED] Insert peripheral IV, , , Once **AND** sodium chloride 0.9 % flush 10 mL, 10 mL, Intravenous, PRN, Christiano Pickett MD    Current Outpatient Medications:   •  omeprazole (priLOSEC) 40 MG capsule, , Disp: , Rfl:   •  tamsulosin (FLOMAX) 0.4 MG capsule 24 hr capsule, Take 1 capsule by mouth Every Night., Disp: , Rfl:   Allergies:  Allergies   Allergen Reactions   • Codeine GI Intolerance     Abdominal Pain     Social History:   Social History     Tobacco Use   • Smoking status: Current Every Day Smoker     Packs/day: 0.00     Types: Cigars   • Smokeless tobacco: Never Used   • Tobacco comment: 2 CIGARS PER DAY, SOMETIMES LESS   Substance Use Topics   • Alcohol use: No      Family History:  Family History   Problem Relation Age of Onset   • Diabetes Mother    • Tuberculosis Father    • Breast cancer Sister    • Bone cancer Sister    • Lung cancer Brother    • Malig Hyperthermia Neg Hx           Review of Systems  See history of present illness and past medical history.     Remainder of ROS is negative.  Constitutional: Negative for chills and fever.   Respiratory: Negative for shortness of breath.   "  Cardiovascular: Negative for chest pain, palpitations and leg swelling.   Musculoskeletal: Positive for back pain (Chronic unchanged).   Skin: Negative for rash.   Neurological: Negative for dizziness, speech difficulty, light-headedness and headaches.     Vitals:   /85   Pulse 75   Temp 97.5 °F (36.4 °C) (Tympanic)   Resp 18   Ht 180.3 cm (71\")   Wt 83.9 kg (185 lb)   SpO2 99%   BMI 25.80 kg/m²   I/O: No intake or output data in the 24 hours ending 08/09/21 9096  Exam:  Patient is examined using the personal protective equipment as per guidelines from infection control for this particular patient as enacted.  Hand washing was performed before and after patient interaction.  General Appearance:    Alert, cooperative, no distress, appears stated age   Head:    Normocephalic, without obvious abnormality, atraumatic   Eyes:    PERRL, conjunctiva/corneas clear, EOM's intact, both eyes   Ears:    Normal external ear canals, both ears   Nose:   Nares normal, septum midline, mucosa normal, no drainage    or sinus tenderness   Throat:   Lips, tongue, gums normal; oral mucosa pink and moist   Neck:   Supple, symmetrical, trachea midline, no adenopathy;     thyroid:  no enlargement/tenderness/nodules; no carotid    bruit or JVD   Back:     Symmetric, no curvature, ROM normal, no CVA tenderness   Lungs:     Clear to auscultation bilaterally, respirations unlabored   Chest Wall:    No tenderness or deformity    Heart:    Regular rate and rhythm, S1 and S2 normal, no murmur, rub   or gallop   Abdomen:     Soft, non-tender, bowel sounds active all four quadrants,     no masses, no hepatomegaly, no splenomegaly   Extremities:   Extremities normal, atraumatic, no cyanosis or edema   Pulses:   Pulses palpable in all extremities; symmetric all extremities   Skin:   Skin color normal, Skin is warm and dry,  no rashes or palpable lesions   Neurologic:   CNII-XII intact, motor strength grossly intact, sensation grossly " intact to light touch, no focal deficits noted       Data Review:      I reviewed the patient's new clinical results.  Results from last 7 days   Lab Units 08/09/21  1451   WBC 10*3/mm3 6.65   HEMOGLOBIN g/dL 13.4   PLATELETS 10*3/mm3 200     Results from last 7 days   Lab Units 08/09/21  1451   SODIUM mmol/L 137   POTASSIUM mmol/L 4.4   CHLORIDE mmol/L 103   CO2 mmol/L 25.1   BUN mg/dL 15   CREATININE mg/dL 0.90   CALCIUM mg/dL 9.2   GLUCOSE mg/dL 69     Microbiology Results (last 10 days)     Procedure Component Value - Date/Time    COVID PRE-OP / PRE-PROCEDURE SCREENING ORDER (NO ISOLATION) - Swab, Nasopharynx [[id number]]  (Normal) Collected: 08/09/21 1712    Lab Status: Final result Specimen: Swab from Nasopharynx Updated: 08/09/21 2228    Narrative:      The following orders were created for panel order COVID PRE-OP / PRE-PROCEDURE SCREENING ORDER (NO ISOLATION) - Swab, Nasopharynx.  Procedure                               Abnormality         Status                     ---------                               -----------         ------                     COVID-19,APTIMA PANTHER(...[[id number]]  Normal              Final result                 Please view results for these tests on the individual orders.    COVID-19,APTIMA PANTHER(ANDRÉS),[facility], NP/OP SWAB IN UTM/VTM/SALINE TRANSPORT MEDIA,24 HR TAT - Swab, Nasopharynx [[id number]]  (Normal) Collected: 08/09/21 1712    Lab Status: Final result Specimen: Swab from Nasopharynx Updated: 08/09/21 2228     COVID19 Not Detected    Narrative:      Fact sheet for providers: https://www.fda.gov/media/698411/download     Fact sheet for patients: https://www.fda.gov/media/190952/download    Test performed by RT PCR.        ECG 12 Lead   Final Result   HEART RATE= 66  bpm   RR Interval= 904  ms   ME Interval= 152  ms   P Horizontal Axis= 34  deg   P Front Axis= 52  deg   QRSD Interval= 136  ms   QT Interval= 415  ms   QRS Axis= 27  deg   T Wave Axis= 24  deg   - ABNORMAL ECG -    Sinus rhythm   Right bundle branch block   NO SIGNIFICANT CHANGE FROM PREVIOUS ECG   Electronically Signed By: Ozzie Colby ([facility]) 09-Aug-2021 20:59:27   Date and Time of Study: 2021-08-09 15:32:14        CT Head Without Contrast    Result Date: 8/9/2021  No acute process identified. Further evaluation could be performed with a MRI examination of the brain as indicated.    Radiation dose reduction techniques were utilized, including automated exposure control and exposure modulation based on body size.  This report was finalized on 8/9/2021 5:07 PM by Dr. Duglas Baker M.D.        Assessment:  Active Hospital Problems    Diagnosis  POA   • **Paresthesias [R20.2]  Unknown   • Facial numbness [R20.0]  Yes   • OA (osteoarthritis) of knee [M17.10]  Yes   • Hyperglycemia [R73.9]  Yes   • Benign prostatic hyperplasia without lower urinary tract symptoms [N40.0]  Yes       Medical decision making:  Bilateral lower extremity paresthesias with significant skin sensitivity involving up to mid thigh area in the setting of episodic right facial postherpetic neuralgia as well as a month and a half history of episodic blurry vision-this presentation is concerning for some sort of myelopathy or white matter disease such as multiple sclerosis.  Plan is to admit the patient and as per recommendation by neurology service on-call consult general neurology service while providing him with neurochecks and fall precautions.  History of postherpetic neuralgia due to shingles of the right side of the face-continue symptomatic management.  History of benign prostatic hyperplasia-continue his home regimen.  Gastroesophageal reflux disease-continue with his home regimen.    Nury Be MD   8/9/2021  17:55 EDT  Much of this encounter note is an electronic transcription/translation of spoken language to printed text. The electronic translation of spoken language may permit erroneous, or at times, nonsensical words or phrases to be  inadvertently transcribed; Although I have reviewed the note for such errors, some may still exist

## 2021-08-09 NOTE — ED NOTES
Nursing report ED to floor  Feliciano Noguera  71 y.o.  male    HPI (triage note):   Chief Complaint   Patient presents with   • Numbness   • Tingling       Admitting doctor:   Nury Be MD    Admitting diagnosis:   The primary encounter diagnosis was Facial numbness. Diagnoses of Leg weakness, bilateral and Paresthesias were also pertinent to this visit.    Code status:   Current Code Status     Date Active Code Status Order ID Comments User Context       Prior    Advance Care Planning Activity          Allergies:   Codeine    Weight:       08/09/21  1446   Weight: 83.9 kg (185 lb)       Most recent vitals:   Vitals:    08/09/21 1446 08/09/21 1506 08/09/21 1541 08/09/21 1611   BP: 128/85 142/87 120/86 126/85   Pulse: 66 75     Resp:  18     Temp:       TempSrc:       SpO2:       Weight: 83.9 kg (185 lb)      Height:           Active LDAs/IV Access:   Lines, Drains & Airways    Active LDAs     Name:   Placement date:   Placement time:   Site:   Days:    Peripheral IV 08/09/21 1620 Left Antecubital   08/09/21    1620    Antecubital   less than 1                Labs (abnormal labs have a star):   Labs Reviewed   TROPONIN (IN-HOUSE) - Normal    Narrative:     Troponin T Reference Range:  <= 0.03 ng/mL-   Negative for AMI  >0.03 ng/mL-     Abnormal for myocardial necrosis.  Clinicians would have to utilize clinical acumen, EKG, Troponin and serial changes to determine if it is an Acute Myocardial Infarction or myocardial injury due to an underlying chronic condition.       Results may be falsely decreased if patient taking Biotin.     MAGNESIUM - Normal   URINALYSIS W/ MICROSCOPIC IF INDICATED (NO CULTURE) - Normal    Narrative:     Urine microscopic not indicated.   CBC WITH AUTO DIFFERENTIAL - Normal   COVID PRE-OP / PRE-PROCEDURE SCREENING ORDER (NO ISOLATION)    Narrative:     The following orders were created for panel order COVID PRE-OP / PRE-PROCEDURE SCREENING ORDER (NO ISOLATION) - Swab,  Nasopharynx.  Procedure                               Abnormality         Status                     ---------                               -----------         ------                     COVID-19,APTIMA PANTHER(...[[id number]]                      In process                   Please view results for these tests on the individual orders.   COVID-19,APTIMA PANTHER (ANDRÉS)[facility] ,NP/OP SWAB IN UTM/VTM/SALINE TRANSPORT MEDIA,24 HR TAT   RAINBOW DRAW    Narrative:     The following orders were created for panel order [place] Draw.  Procedure                               Abnormality         Status                     ---------                               -----------         ------                     Green Top (Gel)[[id number]]                                  Final result               Lavender Top[[id number]]                                     Final result               Gold Top - SST[[id number]]                                   Final result                 Please view results for these tests on the individual orders.   COMPREHENSIVE METABOLIC PANEL    Narrative:     GFR Normal >60  Chronic Kidney Disease <60  Kidney Failure <15     CBC AND DIFFERENTIAL    Narrative:     The following orders were created for panel order CBC & Differential.  Procedure                               Abnormality         Status                     ---------                               -----------         ------                     CBC Auto Differential[[id number]]        Normal              Final result                 Please view results for these tests on the individual orders.   GREEN TOP   LAVENDER TOP   GOLD TOP - SST       EKG:   ECG 12 Lead   Preliminary Result   HEART RATE= 66  bpm   RR Interval= 904  ms   KS Interval= 152  ms   P Horizontal Axis= 34  deg   P Front Axis= 52  deg   QRSD Interval= 136  ms   QT Interval= 415  ms   QRS Axis= 27  deg   T Wave Axis= 24  deg   - ABNORMAL ECG -   Sinus rhythm   Right bundle branch block    Electronically Signed By:    Date and Time of Study: 2021-08-09 15:32:14          Meds given in ED:   Medications   sodium chloride 0.9 % flush 10 mL (has no administration in time range)   sodium chloride 0.9 % flush 10 mL (has no administration in time range)       Imaging results:  CT Head Without Contrast    Result Date: 8/9/2021  No acute process identified. Further evaluation could be performed with a MRI examination of the brain as indicated.    Radiation dose reduction techniques were utilized, including automated exposure control and exposure modulation based on body size.  This report was finalized on 8/9/2021 5:07 PM by Dr. Duglas Baker M.D.        Ambulatory status:   -Stand by assist    Social issues:   Social History     Socioeconomic History   • Marital status:      Spouse name: Not on file   • Number of children: Not on file   • Years of education: Not on file   • Highest education level: Not on file   Tobacco Use   • Smoking status: Current Every Day Smoker     Packs/day: 0.00     Types: Cigars   • Smokeless tobacco: Never Used   • Tobacco comment: 2 CIGARS PER DAY, SOMETIMES LESS   Vaping Use   • Vaping Use: Never used   Substance and Sexual Activity   • Alcohol use: No   • Drug use: No   • Sexual activity: Defer    Nursing report ED to floor       Cassia Wagner RN  08/09/21 5156

## 2021-08-10 ENCOUNTER — APPOINTMENT (OUTPATIENT)
Dept: MRI IMAGING | Facility: HOSPITAL | Age: 71
End: 2021-08-10

## 2021-08-10 PROBLEM — G95.9 MYELOPATHY (HCC): Status: ACTIVE | Noted: 2021-08-10

## 2021-08-10 LAB
ALBUMIN SERPL-MCNC: 3.6 G/DL (ref 3.5–5.2)
ALBUMIN/GLOB SERPL: 1.4 G/DL
ALP SERPL-CCNC: 70 U/L (ref 39–117)
ALT SERPL W P-5'-P-CCNC: 14 U/L (ref 1–41)
ANION GAP SERPL CALCULATED.3IONS-SCNC: 8.6 MMOL/L (ref 5–15)
AST SERPL-CCNC: 17 U/L (ref 1–40)
BASOPHILS # BLD AUTO: 0.05 10*3/MM3 (ref 0–0.2)
BASOPHILS NFR BLD AUTO: 0.8 % (ref 0–1.5)
BILIRUB SERPL-MCNC: 0.5 MG/DL (ref 0–1.2)
BUN SERPL-MCNC: 12 MG/DL (ref 8–23)
BUN/CREAT SERPL: 13.8 (ref 7–25)
CALCIUM SPEC-SCNC: 9.1 MG/DL (ref 8.6–10.5)
CHLORIDE SERPL-SCNC: 103 MMOL/L (ref 98–107)
CO2 SERPL-SCNC: 24.4 MMOL/L (ref 22–29)
CREAT SERPL-MCNC: 0.87 MG/DL (ref 0.76–1.27)
DEPRECATED RDW RBC AUTO: 40.2 FL (ref 37–54)
EOSINOPHIL # BLD AUTO: 0.28 10*3/MM3 (ref 0–0.4)
EOSINOPHIL NFR BLD AUTO: 4.5 % (ref 0.3–6.2)
ERYTHROCYTE [DISTWIDTH] IN BLOOD BY AUTOMATED COUNT: 12.9 % (ref 12.3–15.4)
GFR SERPL CREATININE-BSD FRML MDRD: 86 ML/MIN/1.73
GLOBULIN UR ELPH-MCNC: 2.6 GM/DL
GLUCOSE SERPL-MCNC: 93 MG/DL (ref 65–99)
HBA1C MFR BLD: 5.4 % (ref 4.8–5.6)
HCT VFR BLD AUTO: 41.2 % (ref 37.5–51)
HGB BLD-MCNC: 13.6 G/DL (ref 13–17.7)
IMM GRANULOCYTES # BLD AUTO: 0.01 10*3/MM3 (ref 0–0.05)
IMM GRANULOCYTES NFR BLD AUTO: 0.2 % (ref 0–0.5)
LYMPHOCYTES # BLD AUTO: 1.41 10*3/MM3 (ref 0.7–3.1)
LYMPHOCYTES NFR BLD AUTO: 22.5 % (ref 19.6–45.3)
MCH RBC QN AUTO: 28.3 PG (ref 26.6–33)
MCHC RBC AUTO-ENTMCNC: 33 G/DL (ref 31.5–35.7)
MCV RBC AUTO: 85.7 FL (ref 79–97)
MONOCYTES # BLD AUTO: 0.36 10*3/MM3 (ref 0.1–0.9)
MONOCYTES NFR BLD AUTO: 5.8 % (ref 5–12)
NEUTROPHILS NFR BLD AUTO: 4.15 10*3/MM3 (ref 1.7–7)
NEUTROPHILS NFR BLD AUTO: 66.2 % (ref 42.7–76)
NRBC BLD AUTO-RTO: 0 /100 WBC (ref 0–0.2)
PLATELET # BLD AUTO: 187 10*3/MM3 (ref 140–450)
PMV BLD AUTO: 10.4 FL (ref 6–12)
POTASSIUM SERPL-SCNC: 4.8 MMOL/L (ref 3.5–5.2)
PROT SERPL-MCNC: 6.2 G/DL (ref 6–8.5)
RBC # BLD AUTO: 4.81 10*6/MM3 (ref 4.14–5.8)
SODIUM SERPL-SCNC: 136 MMOL/L (ref 136–145)
WBC # BLD AUTO: 6.26 10*3/MM3 (ref 3.4–10.8)

## 2021-08-10 PROCEDURE — 83036 HEMOGLOBIN GLYCOSYLATED A1C: CPT | Performed by: INTERNAL MEDICINE

## 2021-08-10 PROCEDURE — 72157 MRI CHEST SPINE W/O & W/DYE: CPT

## 2021-08-10 PROCEDURE — G0378 HOSPITAL OBSERVATION PER HR: HCPCS

## 2021-08-10 PROCEDURE — 72156 MRI NECK SPINE W/O & W/DYE: CPT

## 2021-08-10 PROCEDURE — 99204 OFFICE O/P NEW MOD 45 MIN: CPT | Performed by: PSYCHIATRY & NEUROLOGY

## 2021-08-10 PROCEDURE — 85025 COMPLETE CBC W/AUTO DIFF WBC: CPT | Performed by: INTERNAL MEDICINE

## 2021-08-10 PROCEDURE — 0 GADOBENATE DIMEGLUMINE 529 MG/ML SOLUTION: Performed by: HOSPITALIST

## 2021-08-10 PROCEDURE — 80053 COMPREHEN METABOLIC PANEL: CPT | Performed by: INTERNAL MEDICINE

## 2021-08-10 PROCEDURE — A9577 INJ MULTIHANCE: HCPCS | Performed by: HOSPITALIST

## 2021-08-10 PROCEDURE — 96361 HYDRATE IV INFUSION ADD-ON: CPT

## 2021-08-10 PROCEDURE — 96374 THER/PROPH/DIAG INJ IV PUSH: CPT

## 2021-08-10 PROCEDURE — 72158 MRI LUMBAR SPINE W/O & W/DYE: CPT

## 2021-08-10 PROCEDURE — 25010000002 LORAZEPAM PER 2 MG: Performed by: PSYCHIATRY & NEUROLOGY

## 2021-08-10 RX ORDER — LORAZEPAM 2 MG/ML
1 INJECTION INTRAMUSCULAR ONCE AS NEEDED
Status: DISCONTINUED | OUTPATIENT
Start: 2021-08-10 | End: 2021-08-10

## 2021-08-10 RX ORDER — LORAZEPAM 2 MG/ML
1 INJECTION INTRAMUSCULAR ONCE
Status: COMPLETED | OUTPATIENT
Start: 2021-08-10 | End: 2021-08-10

## 2021-08-10 RX ADMIN — GADOBENATE DIMEGLUMINE 17 ML: 529 INJECTION, SOLUTION INTRAVENOUS at 16:31

## 2021-08-10 RX ADMIN — PANTOPRAZOLE SODIUM 40 MG: 40 TABLET, DELAYED RELEASE ORAL at 06:52

## 2021-08-10 RX ADMIN — TAMSULOSIN HYDROCHLORIDE 0.4 MG: 0.4 CAPSULE ORAL at 22:30

## 2021-08-10 RX ADMIN — LORAZEPAM 1 MG: 2 INJECTION INTRAMUSCULAR; INTRAVENOUS at 14:37

## 2021-08-10 RX ADMIN — ACETAMINOPHEN 650 MG: 325 TABLET, FILM COATED ORAL at 06:52

## 2021-08-10 RX ADMIN — SODIUM CHLORIDE, PRESERVATIVE FREE 10 ML: 5 INJECTION INTRAVENOUS at 11:28

## 2021-08-10 RX ADMIN — SODIUM CHLORIDE 100 ML/HR: 9 INJECTION, SOLUTION INTRAVENOUS at 22:30

## 2021-08-10 RX ADMIN — SODIUM CHLORIDE 100 ML/HR: 9 INJECTION, SOLUTION INTRAVENOUS at 06:52

## 2021-08-10 RX ADMIN — SODIUM CHLORIDE, PRESERVATIVE FREE 10 ML: 5 INJECTION INTRAVENOUS at 22:31

## 2021-08-10 NOTE — CASE MANAGEMENT/SOCIAL WORK
Discharge Planning Assessment  [facility]     Patient Name: Feliciano Noguera  MRN: [id number]  Today's Date: 8/10/2021    Admit Date: 8/9/2021    Discharge Needs Assessment     Row Name 08/10/21 1259       Living Environment    Lives With  spouse    Name(s) of Who Lives With Patient  spouse Lili    Current Living Arrangements  home/apartment/condo    Primary Care Provided by  self    Able to Return to Prior Arrangements  yes       Transition Planning    Patient/Family Anticipates Transition to  home with family    Transportation Anticipated  family or friend will provide       Discharge Needs Assessment    Equipment Currently Used at Home  none    Concerns to be Addressed  denies needs/concerns at this time    Equipment Needed After Discharge  none        Discharge Plan     Row Name 08/10/21 1259       Plan    Plan  Plans are home.    Plan Comments  CCP spoke with pt @ bedside, confirmed face sheet and primary pharmacy as [facility] in [place].  Pt states he lives with spouse in  home, does not use any AD for ambulation.  Pt says he has not had any falls r/t symptoms, says he has been careful.  He normally still drives and works.  Plans are home. Instructed that CCP would cont to follow and assist as needed. Estrellita Moscoso RN        Continued Care and Services - Admitted Since 8/9/2021    Coordination has not been started for this encounter.       Expected Discharge Date and Time     Expected Discharge Date Expected Discharge Time    Aug 11, 2021         Demographic Summary    No documentation.       Functional Status    No documentation.       Psychosocial    No documentation.       Abuse/Neglect    No documentation.       Legal    No documentation.       Substance Abuse    No documentation.       Patient Forms    No documentation.           Estrellita Moscoso RN

## 2021-08-10 NOTE — CONSULTS
Patient Identification:  NAME:  Feliciano Noguera  Age:  71 y.o.   Sex:  male   :  1950   MRN:  [id number]       Chief complaint: My legs burn, reason for consult leg paresthesias    History of present illness: This patient is a 71-year-old right-handed white male with history of enlarged prostate hard of hearing GERD who was out about a week or so ago walking around doing work and noted that his legs started to burn this was not a progressive phenomena up until them but came on all of a sudden since then it got a little worse and he feels like his legs are weak as an associated symptom the location both lower extremities but he states the burning and the odd sensation started in the knees and then went downward it did not start in the toes and go upward again it seemed to come on all of a sudden when he was out walking around 1day as context quality as described duration is noted modifying factors so far none.  CT of the head by my independent eyeball review is normal  Note he did have shingles on the right side of his head in the past and has had some recurrent right cheek tingling but he has had that before and does not associated with the new onset of leg symptoms      Past medical history:  Past Medical History:   Diagnosis Date   • Arthritis    • Enlarged prostate    • GERD (gastroesophageal reflux disease)    • [place] (hard of hearing)    • Knee pain, right        Past surgical history:  Past Surgical History:   Procedure Laterality Date   • CHOLECYSTECTOMY WITH INTRAOPERATIVE CHOLANGIOGRAM N/A 2017    Procedure: CHOLECYSTECTOMY LAPAROSCOPIC INTRAOPERATIVE CHOLANGIOGRAM;  Surgeon: Bola Patel MD;  [place] [facility] [place] [facility] [place]  Service:    • COLONOSCOPY N/A 2009    Dr. Jamison Johnson   • COLONOSCOPY N/A 2018    Procedure: COLONOSCOPY into cecum with biopsy;  Surgeon: Bola Patel MD;  Location: [facility] ENDOSCOPY;  Service: General   • ENDOSCOPY N/A 12/15/2008    Dr. Swift  Alex   • ENDOSCOPY N/A 11/14/2018    Procedure: ESOPHAGOGASTRODUODENOSCOPY with biopsy;  Surgeon: Bola Patel MD;  Location: [facility] ENDOSCOPY;  Service: General   • KNEE ARTHROSCOPY Left    • PROSTATE BIOPSY     • ROTATOR CUFF REPAIR Right 09/29/2009    Dr. Arik Harris   • TOTAL KNEE ARTHROPLASTY Right 5/28/2019    Procedure: RIGHT TOTAL KNEE ARTHROPLASTY;  Surgeon: Bola Ramires MD;  Location: [facility] MAIN OR;  Service: Orthopedics       Allergies:  Codeine    Home medications:  Medications Prior to Admission   Medication Sig Dispense Refill Last Dose   • omeprazole (priLOSEC) 40 MG capsule       • tamsulosin (FLOMAX) 0.4 MG capsule 24 hr capsule Take 1 capsule by mouth Every Night.           Hospital medications:  gadobenate dimeglumine, 17 mL, Intravenous, Once in imaging  LORazepam, 1 mg, Intravenous, Once  pantoprazole, 40 mg, Oral, QAM  sodium chloride, 10 mL, Intravenous, Q12H  tamsulosin, 0.4 mg, Oral, Nightly      sodium chloride, 100 mL/hr, Last Rate: 100 mL/hr (08/10/21 0652)      •  acetaminophen **OR** acetaminophen **OR** acetaminophen  •  nitroglycerin  •  ondansetron  •  sodium chloride  •  [COMPLETED] Insert peripheral IV **AND** sodium chloride  •  sodium chloride    Family history:  Family History   Problem Relation Age of Onset   • Diabetes Mother    • Tuberculosis Father    • Breast cancer Sister    • Bone cancer Sister    • Lung cancer Brother    • Malig Hyperthermia Neg Hx        Social history:  Social History     Tobacco Use   • Smoking status: Current Every Day Smoker     Packs/day: 0.00     Types: Cigars   • Smokeless tobacco: Never Used   • Tobacco comment: 2 CIGARS PER DAY, SOMETIMES LESS   Vaping Use   • Vaping Use: Never used   Substance Use Topics   • Alcohol use: No   • Drug use: No       Review of systems:    He does have some low back pain denies bowel bladder changes at all no hair eyes ears nose throat skin bone  lymphatic hematologic oncologic complaints no  chest pain abdominal pain bowel bladder incontinence fever chills or rash    Objective:  Vitals Ranges:   Temp:  [97.3 °F (36.3 °C)-97.7 °F (36.5 °C)] 97.5 °F (36.4 °C)  Heart Rate:  [63-89] 63  Resp:  [14-18] 14  BP: (114-142)/(77-89) 130/89      Physical Exam:  He is awake alert oriented x3 in no distress fund of knowledge good but he is very tangential heart recent remote memory good language function normal well-developed well-nourished in no distress pupils 2 and half constricting to 2 unable to visualize disc retinas extraocular movements full without nystagmus nasolabial folds palate tongue symmetrical normal hearing facial sensation head turning shoulder shrug he occasionally has some eyelid blinking that is a movement disorder.  Motor 5 out of 5 in the upper extremities 4 out of 5 proximal lower extremities 5 out of 5 distal extremities reflexes present throughout absent at the right knee where he had knee surgery good left knee jerk and trace ankle jerks bilaterally but they are definitely there toes downgoing bilaterally loss of light touch in the feet normal light touch elsewhere coordination normal in the upper extremities Station and gait was impossible I thought he would fall heart is regular without murmur neck supple without bruits extremities no clubbing cyanosis edema visual acuity normal at 3 feet    Results review:   I reviewed the patient's new clinical results.    Data review:  Lab Results (last 24 hours)     Procedure Component Value Units Date/Time    Comprehensive Metabolic Panel [[id number]] Collected: 08/10/21 0756    Specimen: Blood Updated: 08/10/21 0914     Glucose 93 mg/dL      BUN 12 mg/dL      Creatinine 0.87 mg/dL      Sodium 136 mmol/L      Potassium 4.8 mmol/L      Comment: Slight hemolysis detected by analyzer. Results may be affected.        Chloride 103 mmol/L      CO2 24.4 mmol/L      Calcium 9.1 mg/dL      Total Protein 6.2 g/dL      Albumin 3.60 g/dL      ALT (SGPT) 14 U/L       AST (SGOT) 17 U/L      Comment: Slight hemolysis detected by analyzer. Results may be affected.        Alkaline Phosphatase 70 U/L      Total Bilirubin 0.5 mg/dL      eGFR Non African Amer 86 mL/min/1.73      Globulin 2.6 gm/dL      A/G Ratio 1.4 g/dL      BUN/Creatinine Ratio 13.8     Anion Gap 8.6 mmol/L     Narrative:      GFR Normal >60  Chronic Kidney Disease <60  Kidney Failure <15      Hemoglobin A1c [[id number]]  (Normal) Collected: 08/10/21 0756    Specimen: Blood Updated: 08/10/21 0844     Hemoglobin A1C 5.40 %     Narrative:      Hemoglobin A1C Ranges:    Increased Risk for Diabetes  5.7% to 6.4%  Diabetes                     >= 6.5%  Diabetic Goal                < 7.0%    CBC Auto Differential [[id number]]  (Normal) Collected: 08/10/21 0756    Specimen: Blood Updated: 08/10/21 0834     WBC 6.26 10*3/mm3      RBC 4.81 10*6/mm3      Hemoglobin 13.6 g/dL      Hematocrit 41.2 %      MCV 85.7 fL      MCH 28.3 pg      MCHC 33.0 g/dL      RDW 12.9 %      RDW-SD 40.2 fl      MPV 10.4 fL      Platelets 187 10*3/mm3      Neutrophil % 66.2 %      Lymphocyte % 22.5 %      Monocyte % 5.8 %      Eosinophil % 4.5 %      Basophil % 0.8 %      Immature Grans % 0.2 %      Neutrophils, Absolute 4.15 10*3/mm3      Lymphocytes, Absolute 1.41 10*3/mm3      Monocytes, Absolute 0.36 10*3/mm3      Eosinophils, Absolute 0.28 10*3/mm3      Basophils, Absolute 0.05 10*3/mm3      Immature Grans, Absolute 0.01 10*3/mm3      nRBC 0.0 /100 WBC     COVID PRE-OP / PRE-PROCEDURE SCREENING ORDER (NO ISOLATION) - Swab, Nasopharynx [[id number]]  (Normal) Collected: 08/09/21 1712    Specimen: Swab from Nasopharynx Updated: 08/09/21 2228    Narrative:      The following orders were created for panel order COVID PRE-OP / PRE-PROCEDURE SCREENING ORDER (NO ISOLATION) - Swab, Nasopharynx.  Procedure                               Abnormality         Status                     ---------                               -----------         ------                      COVID-19,APTIMA PANTHER(...[[id number]]  Normal              Final result                 Please view results for these tests on the individual orders.    COVID-19,APTIMA PANTHER(ANDRÉS),[facility], NP/OP SWAB IN UTM/VTM/SALINE TRANSPORT MEDIA,24 HR TAT - Swab, Nasopharynx [[id number]]  (Normal) Collected: 08/09/21 1712    Specimen: Swab from Nasopharynx Updated: 08/09/21 2228     COVID19 Not Detected    Narrative:      Fact sheet for providers: https://www.fda.gov/media/994085/download     Fact sheet for patients: https://www.fda.gov/media/865811/download    Test performed by RT PCR.    [place] Draw [[id number]] Collected: 08/09/21 1451    Specimen: Blood Updated: 08/09/21 1600    Narrative:      The following orders were created for panel order [place] Draw.  Procedure                               Abnormality         Status                     ---------                               -----------         ------                     Green Top (Gel)[[id number]]                                  Final result               Lavender Top[[id number]]                                     Final result               Gold Top - SST[[id number]]                                   Final result                 Please view results for these tests on the individual orders.    Green Top (Gel) [[id number]] Collected: 08/09/21 1451    Specimen: Blood Updated: 08/09/21 1600     Extra Tube Hold for add-ons.     Comment: Auto resulted.       Lavender Top [[id number]] Collected: 08/09/21 1451    Specimen: Blood Updated: 08/09/21 1600     Extra Tube hold for add-on     Comment: Auto resulted       Gold Top - SST [[id number]] Collected: 08/09/21 1451    Specimen: Blood Updated: 08/09/21 1600     Extra Tube Hold for add-ons.     Comment: Auto resulted.       Comprehensive Metabolic Panel [[id number]] Collected: 08/09/21 1451    Specimen: Blood Updated: 08/09/21 1519     Glucose 69 mg/dL      BUN 15 mg/dL      Creatinine 0.90 mg/dL      Sodium 137  mmol/L      Potassium 4.4 mmol/L      Chloride 103 mmol/L      CO2 25.1 mmol/L      Calcium 9.2 mg/dL      Total Protein 6.7 g/dL      Albumin 4.10 g/dL      ALT (SGPT) 14 U/L      AST (SGOT) 14 U/L      Alkaline Phosphatase 80 U/L      Total Bilirubin 0.3 mg/dL      eGFR Non African Amer 83 mL/min/1.73      Globulin 2.6 gm/dL      A/G Ratio 1.6 g/dL      BUN/Creatinine Ratio 16.7     Anion Gap 8.9 mmol/L     Narrative:      GFR Normal >60  Chronic Kidney Disease <60  Kidney Failure <15      Magnesium [[id number]]  (Normal) Collected: 08/09/21 1451    Specimen: Blood Updated: 08/09/21 1518     Magnesium 1.9 mg/dL     Troponin [[id number]]  (Normal) Collected: 08/09/21 1451    Specimen: Blood Updated: 08/09/21 1517     Troponin T <0.010 ng/mL     Narrative:      Troponin T Reference Range:  <= 0.03 ng/mL-   Negative for AMI  >0.03 ng/mL-     Abnormal for myocardial necrosis.  Clinicians would have to utilize clinical acumen, EKG, Troponin and serial changes to determine if it is an Acute Myocardial Infarction or myocardial injury due to an underlying chronic condition.       Results may be falsely decreased if patient taking Biotin.      Urinalysis With Microscopic If Indicated (No Culture) - Urine, Clean Catch [[id number]]  (Normal) Collected: 08/09/21 1453    Specimen: Urine, Clean Catch Updated: 08/09/21 1512     Color, UA Yellow     Appearance, UA Clear     pH, UA 5.5     Specific Gravity, UA 1.018     Glucose, UA Negative     Ketones, UA Negative     Bilirubin, UA Negative     Blood, UA Negative     Protein, UA Negative     Leuk Esterase, UA Negative     Nitrite, UA Negative     Urobilinogen, UA 0.2 E.U./dL    Narrative:      Urine microscopic not indicated.    CBC & Differential [[id number]]  (Normal) Collected: 08/09/21 1451    Specimen: Blood Updated: 08/09/21 1502    Narrative:      The following orders were created for panel order CBC & Differential.  Procedure                               Abnormality          Status                     ---------                               -----------         ------                     CBC Auto Differential[[id number]]        Normal              Final result                 Please view results for these tests on the individual orders.    CBC Auto Differential [[id number]]  (Normal) Collected: 08/09/21 1451    Specimen: Blood Updated: 08/09/21 1502     WBC 6.65 10*3/mm3      RBC 4.74 10*6/mm3      Hemoglobin 13.4 g/dL      Hematocrit 39.8 %      MCV 84.0 fL      MCH 28.3 pg      MCHC 33.7 g/dL      RDW 12.9 %      RDW-SD 39.1 fl      MPV 10.5 fL      Platelets 200 10*3/mm3      Neutrophil % 60.7 %      Lymphocyte % 25.6 %      Monocyte % 8.0 %      Eosinophil % 4.5 %      Basophil % 0.9 %      Immature Grans % 0.3 %      Neutrophils, Absolute 4.04 10*3/mm3      Lymphocytes, Absolute 1.70 10*3/mm3      Monocytes, Absolute 0.53 10*3/mm3      Eosinophils, Absolute 0.30 10*3/mm3      Basophils, Absolute 0.06 10*3/mm3      Immature Grans, Absolute 0.02 10*3/mm3      nRBC 0.0 /100 WBC            Imaging:  Imaging Results (Last 24 Hours)     Procedure Component Value Units Date/Time    CT Head Without Contrast [[id number]] Collected: 08/09/21 1705     Updated: 08/09/21 1710    Narrative:      CT HEAD WITHOUT CONTRAST     HISTORY: Facial numbness     COMPARISON: None     FINDINGS: The brain and ventricles are symmetrical. There is no evidence  of hemorrhage, hydrocephalus or of abnormal extra-axial fluid. No focal  area of decreased attenuation to suggest acute infarction is identified.  Moderate vascular calcification involving the carotid siphons are noted.       Impression:      No acute process identified. Further evaluation could be  performed with a MRI examination of the brain as indicated.           Radiation dose reduction techniques were utilized, including automated  exposure control and exposure modulation based on body size.     This report was finalized on 8/9/2021 5:07 PM by   Duglas Baker M.D.       XR Chest 1 View [[id number]] Collected: 08/09/21 1512     Updated: 08/09/21 1516    Narrative:      XR CHEST 1 VW-     HISTORY:  Numbness, tingling.     COMPARISON:  Chest radiograph 05/24/2019.     FINDINGS:    A single portable view of the chest was obtained. The cardiac silhouette  is upper normal in size. Mediastinal and hilar contours are within  normal limits. There are low lung volumes. There is no focal  consolidation or effusion. There is asymmetric elevation of the right  hemidiaphragm. There are surgical clips projecting over the right upper  quadrant. There is multilevel degenerative disc disease. There are  suture anchors at the right humeral head.     This report was finalized on 8/9/2021 3:13 PM by Dr. Sugar Chan M.D.         PPE worn at all times washed before washed afterwards disposed of everything properly was now within 6 feet of them for more than few minutes during my exam no aerosols used at any point    Assessment and Plan:     This patient gives a confounding history but apparently has had rather sudden onset while he was out working of leg burning and weakness.  At this point he does not have any bowel or bladder symptoms but both legs are weak to lift off of the bed but distal strength is good.  Reflexes are intact and he has trace ankle jerks and toes are downgoing.  In short, given the rather sudden onset one day, I would be concerned that this is a myelopathy.  I have therefore discontinued the MRI of the brain but will check an MRI with and without contrast of the cervical thoracic and lumbosacral spine he is claustrophobic we will try to get him through it with a milligram or even 2 mg of Ativan monitoring his respirations.  Note he had shingles years ago affecting the right side of his head and has had some tingling on his right cheek since then and the patient does not think that that is related to the leg symptoms that come on but rather to what he has  had previously although it is hard for him to say that flatly.  He denies really any change in sensation of the face change in taste or speech  In short, it is very difficult to call this a Guillain-Barré syndrome based upon the fact that it was rather sudden onset, his ankle jerks are still present , and the fact that he seems to have more proximal weakness then distal weakness which would be very odd for GBS.  So we start with the MRI of the entire spine and go from there      Brian Nevarez MD  08/10/21  10:21 EDT

## 2021-08-10 NOTE — PLAN OF CARE
Problem: Adult Inpatient Plan of Care  Goal: Absence of Hospital-Acquired Illness or Injury  Outcome: Ongoing, Progressing  Intervention: Identify and Manage Fall Risk  Recent Flowsheet Documentation  Taken 8/10/2021 1630 by Iris Drake RN  Safety Promotion/Fall Prevention: patient off unit  Taken 8/10/2021 1600 by Iris Drake RN  Safety Promotion/Fall Prevention: patient off unit  Taken 8/10/2021 1430 by Iris Drake RN  Safety Promotion/Fall Prevention:  • clutter free environment maintained  • assistive device/personal items within reach  Taken 8/10/2021 1200 by Iris Drake RN  Safety Promotion/Fall Prevention:  • clutter free environment maintained  • assistive device/personal items within reach  Taken 8/10/2021 1040 by Iris Drake RN  Safety Promotion/Fall Prevention:  • assistive device/personal items within reach  • clutter free environment maintained  Taken 8/10/2021 0817 by Iris Drake RN  Safety Promotion/Fall Prevention:  • clutter free environment maintained  • assistive device/personal items within reach  Intervention: Prevent Skin Injury  Recent Flowsheet Documentation  Taken 8/10/2021 1430 by Iris Drake RN  Body Position: position maintained  Taken 8/10/2021 1200 by Iris Drake RN  Body Position: position changed independently  Taken 8/10/2021 1040 by Iris Drake RN  Body Position: position changed independently  Taken 8/10/2021 0817 by Iris Drake RN  Body Position: position maintained   Goal Outcome Evaluation:       Outcome Summary: VSS, A&Ox4, up a guillaume, MRI completed, denies any pain, some lower extremity tingling, NIH 2 for sensory and mild facial difference.

## 2021-08-10 NOTE — PROGRESS NOTES
Name: Feliciano Noguera ADMIT: 2021   : 1950  PCP: Daniel Mahan MD    MRN: [id number] LOS: 0 days   AGE/SEX: 71 y.o. male  ROOM: [facility]     Subjective   Subjective   C/o burning in his legs bilaterally. Legs are weak after he tries to stand awhile. No symptoms in his arms or any SOA    Review of Systems     Objective   Objective   Vital Signs  Temp:  [97.3 °F (36.3 °C)-98.3 °F (36.8 °C)] 98.3 °F (36.8 °C)  Heart Rate:  [63-81] 70  Resp:  [14-18] 16  BP: (114-131)/(77-89) 130/84  SpO2:  [94 %-97 %] 94 %  on   ;   Device (Oxygen Therapy): room air  Body mass index is 25.8 kg/m².  Physical Exam  Vitals reviewed.   Constitutional:       General: He is not in acute distress.     Appearance: He is well-developed.   HENT:      Head: Normocephalic and atraumatic.      Mouth/Throat:      Pharynx: No oropharyngeal exudate.   Eyes:      General: No scleral icterus.  Neck:      Vascular: No JVD.   Cardiovascular:      Rate and Rhythm: Normal rate and regular rhythm.      Heart sounds: No murmur heard.     Pulmonary:      Effort: Pulmonary effort is normal. No respiratory distress.      Breath sounds: Normal breath sounds. No wheezing.   Abdominal:      General: Bowel sounds are normal. There is no distension.      Palpations: Abdomen is soft.      Tenderness: There is no abdominal tenderness.   Musculoskeletal:      Cervical back: Neck supple.      Right lower leg: No edema.      Left lower leg: No edema.   Skin:     General: Skin is warm and dry.      Findings: No rash.   Neurological:      Mental Status: He is alert and oriented to person, place, and time.      Cranial Nerves: No cranial nerve deficit.      Motor: Weakness (mild bilateral but 5/5 strength) present.      Comments: Remainder of neuro exam referred to Dr Nevarez who just saw pt prior to me         Results Review     I reviewed the patient's new clinical results.  Results from last 7 days   Lab Units 08/10/21  0756 21  1451   WBC 10*3/mm3  6.26 6.65   HEMOGLOBIN g/dL 13.6 13.4   PLATELETS 10*3/mm3 187 200     Results from last 7 days   Lab Units 08/10/21  0756 08/09/21  1451   SODIUM mmol/L 136 137   POTASSIUM mmol/L 4.8 4.4   CHLORIDE mmol/L 103 103   CO2 mmol/L 24.4 25.1   BUN mg/dL 12 15   CREATININE mg/dL 0.87 0.90   GLUCOSE mg/dL 93 69   Estimated Creatinine Clearance: 89.7 mL/min (by C-G formula based on SCr of 0.87 mg/dL).  Results from last 7 days   Lab Units 08/10/21  0756 08/09/21  1451   ALBUMIN g/dL 3.60 4.10   BILIRUBIN mg/dL 0.5 0.3   ALK PHOS U/L 70 80   AST (SGOT) U/L 17 14   ALT (SGPT) U/L 14 14     Results from last 7 days   Lab Units 08/10/21  0756 08/09/21  1451   CALCIUM mg/dL 9.1 9.2   ALBUMIN g/dL 3.60 4.10   MAGNESIUM mg/dL  --  1.9       COVID19   Date Value Ref Range Status   08/09/2021 Not Detected Not Detected - Ref. Range Final     Hemoglobin A1C   Date/Time Value Ref Range Status   08/10/2021 0756 5.40 4.80 - 5.60 % Final       MRI Cervical Spine With & Without Contrast, MRI Thoracic Spine With & Without Contrast, MRI Lumbar Spine With & Without Contrast  Narrative: MRI THORACIC SPINE W WO CONTRAST-, MRI LUMBAR SPINE W WO CONTRAST-, MRI  CERVICAL SPINE W WO CONTRAST-     INDICATIONS: Myelopathy     TECHNIQUE: Noncontrast and enhanced MRI of the cervical, thoracic,  lumbar spine.     COMPARISON: CT exams from 09/14/2019, 08/03/2017     FINDINGS:     No acute fracture is identified. Multilevel endplate changes are  present, more conspicuous at C5/6, T1/2, L5/S1. Endplate changes with  enhancement noted at the endplates on both sides of T1/2, with a similar  appearance, to a lesser degree, at C5/6, that may reflect Modic 1  endplate change but there appears also to be slight disc enhancement,  that may indicate vertebral osteomyelitis/discitis, correlate  clinically, follow-up advised.     Mild anterolisthesis of C3 on C4, L5 on S1. Bilateral L5 spondylolysis.  Mild retrolisthesis of L4 on L5.      Slight increased T2 signal  apparent at the level of C3/4 may represent  myomalacia or edema. Spinal cord signal is otherwise in range of normal.  No enhancing intracanalicular collections or lesions are identified.     Borderline prominence of the central canal of the mid to lower thoracic  spinal cord measuring up to 2 mm AP, of uncertain clinical significance,  follow-up suggested to exclude any possibility of developing syrinx.     The paraspinal soft tissues show mildly prominent right extrarenal  pelvis, stable in appearance from CT from 09/14/2019.     Axial levels:     C2/3: No significant disc bulge or central stenosis. Facet and  uncovertebral joint hypertrophy contribute to mild right, moderate left  neuroforaminal narrowing.     C3/4: Disc osteophyte complex and facet and uncovertebral joint  hypertrophy result in prominent central stenosis with  flattening/deformation of the spinal cord, and moderate to prominent  left more than right neuroforaminal narrowing.     C4/5: Disc osteophyte complex mildly narrows the anterior thecal space  and contributes to mild bilateral neuroforaminal narrowing.     C5/6: Disc osteophyte complex and facet and uncovertebral joint  hypertrophy result in prominent central stenosis, and mild right,  moderate left neuroforaminal narrowing.     C6/7: No significant disc bulge or central stenosis. Facet and  uncovertebral joint hypertrophy result in prominent right, moderate left  neuroforaminal narrowing.     C7/T1: Disc osteophyte complex contributes to mild central stenosis and  uncovertebral joint hypertrophy results in prominent right, mild left  neuroforaminal narrowing.     T1/2: Mild broad-based disc bulge results in mild central stenosis,  without significant neuroforaminal narrowing.     T2/3: Broad-based disc bulge contributes to mild central stenosis.  Neuroforamina are patent.     T3/4, T4/5: No significant central or neuroforaminal stenosis.     T5/6: No significant disc bulge, central or  left neuroforaminal  narrowing. Facet hypertrophy results in mild to moderate right  neuroforaminal narrowing.     T6/7: Central disc protrusion results in mild to moderate central  stenosis. The neural foramina appear patent.     T7/8: Right paracentral disc protrusion flattens the anterior right  aspect of the cord and results in mild central stenosis. The neural  foramina appear patent.     T8/9: A central, upwardly directed disc extrusion narrows the anterior  thecal space, without significant neuroforaminal narrowing.     T9/10: Broad-based disc bulge results in mild central stenosis, and  facet hypertrophy mildly narrows the right neural foramen. No  significant left neuroforaminal narrowing.     T10/11: Right paracentral disc bulge and prominent ligamentum flavum and  facet hypertrophy are present with mild central stenosis, moderate right  and mild left neuroforaminal narrowing.     T11/12: Small broad-based disc bulge slightly narrows the anterior  thecal space. No significant neuroforaminal narrowing.     T12/L1: No significant disc bulge, central or neuroforaminal stenosis.     L1/2: No significant disc bulge, central or neuroforaminal stenosis.     L2/3: Broad-based disc bulge and facet and ligamentum flavum hypertrophy  result in prominent central stenosis, mild right and moderate left  neuroforaminal narrowing.     L3/4: Broad-based disc bulge, more prominent at the neural foramina  results in moderate to prominent central stenosis, and moderate to  prominent right more than left neuroforaminal narrowing.     L4/5: Broad-based disc bulge and disc uncoverage related to  retrolisthesis of L4 on L5 without significant central stenosis. Disc  bulging and facet and ligamentum flavum hypertrophy contribute to  prominent bilateral neuroforaminal narrowing.     L5/S1: Disc uncoverage is noted related to anterolisthesis of L5 on S1,  but no significant central stenosis. Endplate spurring and disc  bulge  contribute to severe bilateral neuroforaminal narrowing.           Impression:          1. Modic 1 endplate changes versus possible discitis/osteomyelitis at  T1/2, with similar appearance less prominently at C5/6, correlate  clinically, follow-up advised.  2. Multilevel spondyloarthropathy, as detailed above. Slight increased  T2 signal apparent at the level of C3/4 may represent myelomalacia or  edema.   3. Borderline prominence of the central canal of the mid to lower  thoracic spinal cord measuring up to 2 mm AP, of uncertain clinical  significance, follow-up suggested to exclude any possibility of  developing syrinx.     Discussed by telephone with patient's nurse, Lani, at time of  interpretation, 1757, 8/10/2021.           This report was finalized on 8/10/2021 6:03 PM by Dr. Jorge Clarke M.D.       Scheduled Medications  pantoprazole, 40 mg, Oral, QAM  sodium chloride, 10 mL, Intravenous, Q12H  tamsulosin, 0.4 mg, Oral, Nightly    Infusions  sodium chloride, 100 mL/hr, Last Rate: 100 mL/hr (08/10/21 0652)    Diet  Diet Regular       Assessment/Plan     Active Hospital Problems    Diagnosis  POA   • **Paresthesias [R20.2]  Unknown   • Myelopathy (CMS/HCC) [G95.9]  Unknown   • Facial numbness [R20.0]  Yes   • OA (osteoarthritis) of knee [M17.10]  Yes   • Hyperglycemia [R73.9]  Yes   • Benign prostatic hyperplasia without lower urinary tract symptoms [N40.0]  Yes      Resolved Hospital Problems   No resolved problems to display.       71 y.o. male admitted with Paresthesias.    · Abnormal MRI spine noted as above. Able to be completed with Ativan. Will defer to neuro but doubt any infection despite report. Labs are pretty unremarkable. Certainly will not start abx at this time unless he shows signs of sepsis  · D/w Dr Nevarez earlier prior to MRI being done. Nursing to contact him with above results    · SCDs for DVT prophylaxis.  · Dispo:TBD      Vineet Alejandre MD  [facility]  08/10/21  18:31 EDT

## 2021-08-11 VITALS
HEIGHT: 71 IN | TEMPERATURE: 97.8 F | RESPIRATION RATE: 16 BRPM | OXYGEN SATURATION: 93 % | WEIGHT: 175 LBS | HEART RATE: 91 BPM | SYSTOLIC BLOOD PRESSURE: 129 MMHG | DIASTOLIC BLOOD PRESSURE: 88 MMHG | BODY MASS INDEX: 24.5 KG/M2

## 2021-08-11 PROBLEM — R73.9 HYPERGLYCEMIA: Status: RESOLVED | Noted: 2017-06-28 | Resolved: 2021-08-11

## 2021-08-11 LAB
CRP SERPL-MCNC: <0.3 MG/DL (ref 0–0.5)
ERYTHROCYTE [SEDIMENTATION RATE] IN BLOOD: 5 MM/HR (ref 0–20)

## 2021-08-11 PROCEDURE — 86140 C-REACTIVE PROTEIN: CPT | Performed by: NURSE PRACTITIONER

## 2021-08-11 PROCEDURE — G0378 HOSPITAL OBSERVATION PER HR: HCPCS

## 2021-08-11 PROCEDURE — 96361 HYDRATE IV INFUSION ADD-ON: CPT

## 2021-08-11 PROCEDURE — 99212 OFFICE O/P EST SF 10 MIN: CPT | Performed by: PSYCHIATRY & NEUROLOGY

## 2021-08-11 PROCEDURE — 85652 RBC SED RATE AUTOMATED: CPT | Performed by: NURSE PRACTITIONER

## 2021-08-11 PROCEDURE — 99213 OFFICE O/P EST LOW 20 MIN: CPT | Performed by: NURSE PRACTITIONER

## 2021-08-11 RX ADMIN — PANTOPRAZOLE SODIUM 40 MG: 40 TABLET, DELAYED RELEASE ORAL at 07:02

## 2021-08-11 RX ADMIN — SODIUM CHLORIDE 100 ML/HR: 9 INJECTION, SOLUTION INTRAVENOUS at 07:02

## 2021-08-11 NOTE — SIGNIFICANT NOTE
08/11/21 1057   OTHER   Discipline physical therapist   Rehab Time/Intention   Session Not Performed other (see comments)  (spoke with pt this am, he reports no mobility deficits at this time. pt is up ad guillaume in his room. He is hopeful to DC home soon. No acute PT needs. Will sign off.)

## 2021-08-11 NOTE — PROGRESS NOTES
Patient Identification:  NAME:  Feliciano Noguera  Age:  71 y.o.   Sex:  male   :  1950   MRN:  [id number]       Chief complaint: Myelopathy, legs burning    History of present illness: He states it was all gone today then it came back and now his feet feel cold.  No bowel bladder changes he has an abnormal MRI of the cervical spine with C3-4 disease and also further down in the thoracic spine possibly with discitis we are going to have neurosurgery see him.      Past medical history:  Past Medical History:   Diagnosis Date   • Arthritis    • Enlarged prostate    • GERD (gastroesophageal reflux disease)    • [place] (hard of hearing)    • Knee pain, right        Allergies:  Codeine    Home medications:  Medications Prior to Admission   Medication Sig Dispense Refill Last Dose   • omeprazole (priLOSEC) 40 MG capsule       • tamsulosin (FLOMAX) 0.4 MG capsule 24 hr capsule Take 1 capsule by mouth Every Night.           Hospital medications:  pantoprazole, 40 mg, Oral, QAM  sodium chloride, 10 mL, Intravenous, Q12H  tamsulosin, 0.4 mg, Oral, Nightly      sodium chloride, 100 mL/hr, Last Rate: 100 mL/hr (21 0702)      •  acetaminophen **OR** acetaminophen **OR** acetaminophen  •  nitroglycerin  •  ondansetron  •  sodium chloride  •  [COMPLETED] Insert peripheral IV **AND** sodium chloride  •  sodium chloride      Objective:  Vitals Ranges:   Temp:  [97.4 °F (36.3 °C)-97.8 °F (36.6 °C)] 97.8 °F (36.6 °C)  Heart Rate:  [66-91] 91  Resp:  [14-16] 16  BP: (108-129)/(78-90) 129/88      Physical Exam:  He is awake alert tangential history giver normal cranial nerves II through VII tongue is midline strength in the legs seems good both dorsi and plantar flexion as well reflexes trace absent at the right knee trace left knee trace ankle jerks toes downgoing bilaterally no sensory level    Results review:   I reviewed the patient's new clinical results.    Data review:  Lab Results (last 24 hours)     ** No results  "found for the last 24 hours. **           Imaging:  Imaging Results (Last 24 Hours)     ** No results found for the last 24 hours. **         PPE worn at all times washed before washed afterwards disposed of everything properly was not within 6 feet of him for more than few minutes during my exam no aerosols used at any point    Assessment and Plan:       Paresthesias    Hyperglycemia    Benign prostatic hyperplasia without lower urinary tract symptoms    OA (osteoarthritis) of knee    Facial numbness    Myelopathy (CMS/HCC)    This patient does have abnormalities of the cervical and thoracic spine and we are going to have neurosurgery evaluate this patient.  Note he does not look like a Guillain-Barré syndrome either by his history or by his exam (he still has trace ankle jerks).  Also recall this was not really a progressive phenomena but he states that he noted it 1 day.  It must be noted that this patient is a VERY odd history giver and it is not entirely clear that he actually has something brand-new going on.  For example, he states \"I was fine today and now it is all come back\" I asked him what his come back he states my feet \"feel cold\" which does not really make sense with any of this that is going on.  Note that his original complaint was \"burning in the knees\" which absolutely does not make any sense to this neurologist  So, at this point I do not think he has Guillain-Barré syndrome and we will see what neurosurgery has to say about this patient's spine.      Brian Nevarez MD  08/11/21  14:55 EDT  "

## 2021-08-11 NOTE — CONSULTS
"[facility], [place], operated by [facility] NEUROSURGERY CONSULT NOTE    Patient name: Feliciano Noguera  Referring Provider: Dr. Brian Nevarez  Reason for Consultation: Abnormal MRI spine, Sudden onset leg weakness, Must be Myelopathic     Patient Care Team:  Daniel Mahan MD as PCP - General (Family Medicine)    Chief complaint: Burning and stinging in BLE from knees down and back pain    Subjective .     History of present illness:    Patient is a 71 y.o. right handed male Sitting in bed, wife at bedside.  States that he is admitted to the hospital related to his back pain with paresthesia.  He states this has been ongoing for approximately 2 weeks with stinging and burning in his legs from knees down, sore feet feeling like \"there is a fire on them.\"  He states he does not remember any trauma, twisting, or lifting incident.  But he states that he was doing a job a few days ago where his feet were burning so bad that his legs felt like they were fixing to give out in a torch was on them.  In Addition, he states his feet are cold.  He denies any bowel or bladder incontinence.  In addition, he has back pain that radiates out each side of his low back, has some neck pain, and upper thoracic pain, but denies any arm pain or weakness, in addition.    He states that he has a sister who had breast cancer with mets, and 2 brothers that had cancer 1 having lung cancer with mets to the brain the other having bladder cancer.  He has a third brother that had diabetes and heart disease.  He denies any cancer, or heart disease, or A. fib and is not taking any anticoagulants.    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 on the side.  He does smoke cigars approximately 2 a day, he has been smoking since he was 9 years old.  Drinks occasionally, and does no illicit drugs.    Review of Systems  Review of Systems   Constitutional: Negative for activity change.   Gastrointestinal: Negative for " nausea and vomiting.   Genitourinary: Negative for difficulty urinating.   Musculoskeletal: Positive for back pain. Negative for gait problem.   Neurological: Positive for weakness.        Burning/stinging in lower extremities   Psychiatric/Behavioral: Negative for agitation.   All other systems reviewed and are negative.      History  PAST MEDICAL HISTORY  Past Medical History:   Diagnosis Date   • Arthritis    • Enlarged prostate    • GERD (gastroesophageal reflux disease)    • [place] (hard of hearing)    • Knee pain, right        PAST SURGICAL HISTORY  Past Surgical History:   Procedure Laterality Date   • CHOLECYSTECTOMY WITH INTRAOPERATIVE CHOLANGIOGRAM N/A 8/1/2017    Procedure: CHOLECYSTECTOMY LAPAROSCOPIC INTRAOPERATIVE CHOLANGIOGRAM;  Surgeon: Bola Patel MD;  Location: [facility] OR [facility];  Service:    • COLONOSCOPY N/A 09/24/2009    Dr. Jamison Johnson   • COLONOSCOPY N/A 11/14/2018    Procedure: COLONOSCOPY into cecum with biopsy;  Surgeon: Bola Patel MD;  Location: [facility] ENDOSCOPY;  Service: General   • ENDOSCOPY N/A 12/15/2008    Dr. Jamison Johnson   • ENDOSCOPY N/A 11/14/2018    Procedure: ESOPHAGOGASTRODUODENOSCOPY with biopsy;  Surgeon: Bola Patel MD;  Location: [facility] ENDOSCOPY;  Service: General   • KNEE ARTHROSCOPY Left    • PROSTATE BIOPSY     • ROTATOR CUFF REPAIR Right 09/29/2009    Dr. Arik Harris   • TOTAL KNEE ARTHROPLASTY Right 5/28/2019    Procedure: RIGHT TOTAL KNEE ARTHROPLASTY;  Surgeon: Bola Ramires MD;  Location: [facility] OR;  Service: Orthopedics       FAMILY HISTORY  Family History   Problem Relation Age of Onset   • Diabetes Mother    • Tuberculosis Father    • Breast cancer Sister    • Bone cancer Sister    • Lung cancer Brother    • Malig Hyperthermia Neg Hx        SOCIAL HISTORY  Social History     Tobacco Use   • Smoking status: Current Every Day Smoker     Packs/day: 0.00     Types: Cigars   • Smokeless tobacco: Never Used   • Tobacco comment: 2  CIGARS PER DAY, SOMETIMES LESS   Vaping Use   • Vaping Use: Never used   Substance Use Topics   • Alcohol use: No   • Drug use: No       retired  Works part time doing [employer]    Allergies:  Codeine    MEDICATIONS:  Medications Prior to Admission   Medication Sig Dispense Refill Last Dose   • omeprazole (priLOSEC) 40 MG capsule       • tamsulosin (FLOMAX) 0.4 MG capsule 24 hr capsule Take 1 capsule by mouth Every Night.        Objective     Results Review:  LABS:    Admission on 08/09/2021   Component Date Value Ref Range Status   • QT Interval 08/09/2021 415  ms Final   • Glucose 08/09/2021 69  65 - 99 mg/dL Final   • BUN 08/09/2021 15  8 - 23 mg/dL Final   • Creatinine 08/09/2021 0.90  0.76 - 1.27 mg/dL Final   • Sodium 08/09/2021 137  136 - 145 mmol/L Final   • Potassium 08/09/2021 4.4  3.5 - 5.2 mmol/L Final   • Chloride 08/09/2021 103  98 - 107 mmol/L Final   • CO2 08/09/2021 25.1  22.0 - 29.0 mmol/L Final   • Calcium 08/09/2021 9.2  8.6 - 10.5 mg/dL Final   • Total Protein 08/09/2021 6.7  6.0 - 8.5 g/dL Final   • Albumin 08/09/2021 4.10  3.50 - 5.20 g/dL Final   • ALT (SGPT) 08/09/2021 14  1 - 41 U/L Final   • AST (SGOT) 08/09/2021 14  1 - 40 U/L Final   • Alkaline Phosphatase 08/09/2021 80  39 - 117 U/L Final   • Total Bilirubin 08/09/2021 0.3  0.0 - 1.2 mg/dL Final   • eGFR Non  Amer 08/09/2021 83  >60 mL/min/1.73 Final   • Globulin 08/09/2021 2.6  gm/dL Final   • A/G Ratio 08/09/2021 1.6  g/dL Final   • BUN/Creatinine Ratio 08/09/2021 16.7  7.0 - 25.0 Final   • Anion Gap 08/09/2021 8.9  5.0 - 15.0 mmol/L Final   • Troponin T 08/09/2021 <0.010  0.000 - 0.030 ng/mL Final   • Magnesium 08/09/2021 1.9  1.6 - 2.4 mg/dL Final   • Color, UA 08/09/2021 Yellow  Yellow, Straw Final   • Appearance, UA 08/09/2021 Clear  Clear Final   • pH, UA 08/09/2021 5.5  5.0 - 8.0 Final   • Specific Gravity, UA 08/09/2021 1.018  1.005 - 1.030 Final   • Glucose, UA 08/09/2021 Negative  Negative Final   • Ketones, UA  08/09/2021 Negative  Negative Final   • Bilirubin, UA 08/09/2021 Negative  Negative Final   • Blood, UA 08/09/2021 Negative  Negative Final   • Protein, UA 08/09/2021 Negative  Negative Final   • Leuk Esterase, UA 08/09/2021 Negative  Negative Final   • Nitrite, UA 08/09/2021 Negative  Negative Final   • Urobilinogen, UA 08/09/2021 0.2 E.U./dL  0.2 - 1.0 E.U./dL Final   • Extra Tube 08/09/2021 Hold for add-ons.   Final    Auto resulted.   • Extra Tube 08/09/2021 hold for add-on   Final    Auto resulted   • Extra Tube 08/09/2021 Hold for add-ons.   Final    Auto resulted.   • WBC 08/09/2021 6.65  3.40 - 10.80 10*3/mm3 Final   • RBC 08/09/2021 4.74  4.14 - 5.80 10*6/mm3 Final   • Hemoglobin 08/09/2021 13.4  13.0 - 17.7 g/dL Final   • Hematocrit 08/09/2021 39.8  37.5 - 51.0 % Final   • MCV 08/09/2021 84.0  79.0 - 97.0 fL Final   • MCH 08/09/2021 28.3  26.6 - 33.0 pg Final   • MCHC 08/09/2021 33.7  31.5 - 35.7 g/dL Final   • RDW 08/09/2021 12.9  12.3 - 15.4 % Final   • RDW-SD 08/09/2021 39.1  37.0 - 54.0 fl Final   • MPV 08/09/2021 10.5  6.0 - 12.0 fL Final   • Platelets 08/09/2021 200  140 - 450 10*3/mm3 Final   • Neutrophil % 08/09/2021 60.7  42.7 - 76.0 % Final   • Lymphocyte % 08/09/2021 25.6  19.6 - 45.3 % Final   • Monocyte % 08/09/2021 8.0  5.0 - 12.0 % Final   • Eosinophil % 08/09/2021 4.5  0.3 - 6.2 % Final   • Basophil % 08/09/2021 0.9  0.0 - 1.5 % Final   • Immature Grans % 08/09/2021 0.3  0.0 - 0.5 % Final   • Neutrophils, Absolute 08/09/2021 4.04  1.70 - 7.00 10*3/mm3 Final   • Lymphocytes, Absolute 08/09/2021 1.70  0.70 - 3.10 10*3/mm3 Final   • Monocytes, Absolute 08/09/2021 0.53  0.10 - 0.90 10*3/mm3 Final   • Eosinophils, Absolute 08/09/2021 0.30  0.00 - 0.40 10*3/mm3 Final   • Basophils, Absolute 08/09/2021 0.06  0.00 - 0.20 10*3/mm3 Final   • Immature Grans, Absolute 08/09/2021 0.02  0.00 - 0.05 10*3/mm3 Final   • nRBC 08/09/2021 0.0  0.0 - 0.2 /100 WBC Final   • COVID19 08/09/2021 Not Detected  Not  Detected - Ref. Range Final   • Glucose 08/10/2021 93  65 - 99 mg/dL Final   • BUN 08/10/2021 12  8 - 23 mg/dL Final   • Creatinine 08/10/2021 0.87  0.76 - 1.27 mg/dL Final   • Sodium 08/10/2021 136  136 - 145 mmol/L Final   • Potassium 08/10/2021 4.8  3.5 - 5.2 mmol/L Final    Slight hemolysis detected by analyzer. Results may be affected.   • Chloride 08/10/2021 103  98 - 107 mmol/L Final   • CO2 08/10/2021 24.4  22.0 - 29.0 mmol/L Final   • Calcium 08/10/2021 9.1  8.6 - 10.5 mg/dL Final   • Total Protein 08/10/2021 6.2  6.0 - 8.5 g/dL Final   • Albumin 08/10/2021 3.60  3.50 - 5.20 g/dL Final   • ALT (SGPT) 08/10/2021 14  1 - 41 U/L Final   • AST (SGOT) 08/10/2021 17  1 - 40 U/L Final    Slight hemolysis detected by analyzer. Results may be affected.   • Alkaline Phosphatase 08/10/2021 70  39 - 117 U/L Final   • Total Bilirubin 08/10/2021 0.5  0.0 - 1.2 mg/dL Final   • eGFR Non  Amer 08/10/2021 86  >60 mL/min/1.73 Final   • Globulin 08/10/2021 2.6  gm/dL Final   • A/G Ratio 08/10/2021 1.4  g/dL Final   • BUN/Creatinine Ratio 08/10/2021 13.8  7.0 - 25.0 Final   • Anion Gap 08/10/2021 8.6  5.0 - 15.0 mmol/L Final   • WBC 08/10/2021 6.26  3.40 - 10.80 10*3/mm3 Final   • RBC 08/10/2021 4.81  4.14 - 5.80 10*6/mm3 Final   • Hemoglobin 08/10/2021 13.6  13.0 - 17.7 g/dL Final   • Hematocrit 08/10/2021 41.2  37.5 - 51.0 % Final   • MCV 08/10/2021 85.7  79.0 - 97.0 fL Final   • MCH 08/10/2021 28.3  26.6 - 33.0 pg Final   • MCHC 08/10/2021 33.0  31.5 - 35.7 g/dL Final   • RDW 08/10/2021 12.9  12.3 - 15.4 % Final   • RDW-SD 08/10/2021 40.2  37.0 - 54.0 fl Final   • MPV 08/10/2021 10.4  6.0 - 12.0 fL Final   • Platelets 08/10/2021 187  140 - 450 10*3/mm3 Final   • Neutrophil % 08/10/2021 66.2  42.7 - 76.0 % Final   • Lymphocyte % 08/10/2021 22.5  19.6 - 45.3 % Final   • Monocyte % 08/10/2021 5.8  5.0 - 12.0 % Final   • Eosinophil % 08/10/2021 4.5  0.3 - 6.2 % Final   • Basophil % 08/10/2021 0.8  0.0 - 1.5 % Final   •  Immature Grans % 08/10/2021 0.2  0.0 - 0.5 % Final   • Neutrophils, Absolute 08/10/2021 4.15  1.70 - 7.00 10*3/mm3 Final   • Lymphocytes, Absolute 08/10/2021 1.41  0.70 - 3.10 10*3/mm3 Final   • Monocytes, Absolute 08/10/2021 0.36  0.10 - 0.90 10*3/mm3 Final   • Eosinophils, Absolute 08/10/2021 0.28  0.00 - 0.40 10*3/mm3 Final   • Basophils, Absolute 08/10/2021 0.05  0.00 - 0.20 10*3/mm3 Final   • Immature Grans, Absolute 08/10/2021 0.01  0.00 - 0.05 10*3/mm3 Final   • nRBC 08/10/2021 0.0  0.0 - 0.2 /100 WBC Final   • Hemoglobin A1C 08/10/2021 5.40  4.80 - 5.60 % Final       DIAGNOSTICS:  MRI of the cervical spine reveals C3-4 narrowing with cord signal change, C5-6 narrowing, or prominent on the left, no severe cord compression Dr. Pierson does not feel this is discitis feels that there is more degenerative changes.  Thoracic spine MRI reveals large central canal versus syrinx.  No cord compression.    Lumbar spine MRI reveals L2-3, L3-4, L4-5, L5-S1 stenosis, no cord compression.    Results Review:   I reviewed the patient's new clinical results.  I personally reviewed and interpreted the patient's reports and images with Dr. Gomes.     Vital Signs   Temp:  [97.4 °F (36.3 °C)-97.8 °F (36.6 °C)] 97.8 °F (36.6 °C)  Heart Rate:  [66-91] 91  Resp:  [14-16] 16  BP: (108-129)/(78-90) 129/88    Physical Exam:  Physical Exam  Vitals and nursing note reviewed.   Constitutional:       Appearance: Normal appearance.   HENT:      Head: Normocephalic and atraumatic.      Mouth/Throat:      Mouth: Mucous membranes are moist.      Pharynx: Oropharynx is clear.   Eyes:      Conjunctiva/sclera: Conjunctivae normal.   Cardiovascular:      Rate and Rhythm: Tachycardia present.      Pulses: Normal pulses.      Heart sounds: Normal heart sounds.   Pulmonary:      Effort: Pulmonary effort is normal.      Breath sounds: Normal air entry.   Musculoskeletal:         General: Normal range of motion.      Cervical back: Normal range of motion  and neck supple.      Right lower leg: No edema.      Left lower leg: No edema.   Skin:     General: Skin is warm and dry.          Neurological:      Mental Status: He is alert and oriented to person, place, and time.      GCS: GCS eye subscore is 4. GCS verbal subscore is 5. GCS motor subscore is 6.      Gait: Gait is intact. Gait and tandem walk normal.      Deep Tendon Reflexes:      Reflex Scores:       Tricep reflexes are 0 on the right side and 0 on the left side.       Bicep reflexes are 0 on the right side and 0 on the left side.       Brachioradialis reflexes are 0 on the right side and 0 on the left side.       Patellar reflexes are 0 on the right side and 0 on the left side.       Achilles reflexes are 0 on the right side and 0 on the left side.  Psychiatric:         Attention and Perception: Attention normal.         Mood and Affect: Mood normal.         Speech: Speech normal.         Behavior: Behavior normal. Behavior is cooperative.         Thought Content: Thought content normal.         Cognition and Memory: Cognition normal.         Judgment: Judgment normal.       Neurologic Exam     Mental Status   Oriented to person, place, and time.   Attention: normal. Concentration: normal.   Speech: speech is normal   Level of consciousness: alert    Motor Exam   Muscle bulk: normal    Strength   Strength 5/5 except as noted.   Left iliopsoas: 4/5  Right glutei: 4/5    Sensory Exam   Right leg light touch: decreased from knee (Feels burning/stinging from knees down to toes)  Left leg light touch: decreased from knee    Gait, Coordination, and Reflexes     Gait  Gait: normal    Coordination   Tandem walking coordination: normal    Tremor   Resting tremor: absent  Intention tremor: absent  Action tremor: absent    Reflexes   Right brachioradialis: 0  Left brachioradialis: 0  Right biceps: 0  Left biceps: 0  Right triceps: 0  Left triceps: 0  Right patellar: 0  Left patellar: 0  Right achilles: 0  Left  "achilles: 0  Right plantar: normal  Left plantar: normal  Right Lucero: absent  Left Lucero: absent  Right ankle clonus: absent (Right/left)Right leg raise on the right causes pain behind right knee and straight leg raise on the left causes pain from knee down to calf  Able to toe/heel walk without problems  Able to squat on 1 foot independently while standing on the toes on the right and left without any noted weakness       Assessment/Plan    Sitting on the edge of bed, wife present at bedside.  States that he is feeling well, has some neck pain with palpation, range of motion is good and strength is 5/5 in all directions cervical spine.  Some upper thoracic pain, and low back pain with palpation.  States that he does get some relief with walking, sometimes with sitting, he is not able to lay still for very long.      Paresthesias    Benign prostatic hyperplasia without lower urinary tract symptoms    OA (osteoarthritis) of knee    Facial numbness    Myelopathy (CMS/HCC)      PLAN:   Ok to d/c home, as there is no surgical intervention needed, and discitis is not suspected, as labs are WNL, and he is not having a lot of pain.  Findings on the MRI cervical spine seem to be more degenerative changes.  Will order and FRANKLIN as an outpatient and have him return to the office in f/up.      I discussed the patient's findings and my recommendations with patient, family and Dr. Mireya Felton, APRN  08/11/21  17:02 EDT    \"Dictated utilizing Dragon dictation\".      "

## 2021-08-12 ENCOUNTER — TELEPHONE (OUTPATIENT)
Dept: NEUROSURGERY | Facility: CLINIC | Age: 71
End: 2021-08-12

## 2021-08-12 DIAGNOSIS — R20.0 NUMBNESS AND TINGLING OF BOTH FEET: Primary | ICD-10-CM

## 2021-08-12 DIAGNOSIS — R20.2 NUMBNESS AND TINGLING OF BOTH FEET: Primary | ICD-10-CM

## 2021-08-12 NOTE — DISCHARGE SUMMARY
Patient Name: Feliciano Noguera  : 1950  MRN: [id number]    Date of Admission: 2021  Date of Discharge:  2021  Primary Care Physician: Daniel Mahan MD      Chief Complaint:   Numbness and Tingling      Discharge Diagnoses     Active Hospital Problems    Diagnosis  POA   • **Paresthesias [R20.2]  Unknown   • Myelopathy (CMS/HCC) [G95.9]  Unknown   • Facial numbness [R20.0]  Yes   • OA (osteoarthritis) of knee [M17.10]  Yes   • Benign prostatic hyperplasia without lower urinary tract symptoms [N40.0]  Yes      Resolved Hospital Problems    Diagnosis Date Resolved POA   • Hyperglycemia [R73.9] 2021 Yes        Hospital Course     Mr. Noguera is a 71 y.o. male with a history of BPH who presented to [facility] initially complaining of unusual paresthesias and burning pain in his legs.  Please see the admitting history and physical for further details.  His history was a little difficult to pin down as the details seemed to shift with each interview. Neurology ordered and MRI spine which showed the findings listed below. Neurosurgery saw him and did not feel there was anything surgical there. He had no evidence of discitis and in fact gives a history of remote trauma 40 yrs ago to this area. Neuro did not feel this was MS or GBS and canceled MRI of his brain. KAYE felt he would benefit from FRANKLIN but patient did not want to stay for this in house and asked to be discharged with outpatient follow up and potential FR[facility]LIN instead.    Day of Discharge     Subjective:  Pain and symptoms come and go. He is ambulatory throughout the unit.    Physical Exam:  Temp:  [97.5 °F (36.4 °C)-97.8 °F (36.6 °C)] 97.8 °F (36.6 °C)  Heart Rate:  [66-91] 91  Resp:  [14-16] 16  BP: (114-129)/(78-90) 129/88  Body mass index is 24.41 kg/m².  Physical Exam  Vitals reviewed.   Constitutional:       General: He is not in acute distress.     Appearance: He is well-developed.   HENT:      Head: Normocephalic and  atraumatic.      Mouth/Throat:      Pharynx: No oropharyngeal exudate.   Eyes:      General: No scleral icterus.  Neck:      Vascular: No JVD.   Cardiovascular:      Rate and Rhythm: Normal rate and regular rhythm.      Heart sounds: No murmur heard.     Pulmonary:      Effort: Pulmonary effort is normal. No respiratory distress.      Breath sounds: Normal breath sounds. No wheezing.   Abdominal:      General: Bowel sounds are normal. There is no distension.      Palpations: Abdomen is soft.      Tenderness: There is no abdominal tenderness.   Musculoskeletal:      Cervical back: Neck supple.      Right lower leg: No edema.      Left lower leg: No edema.   Skin:     General: Skin is warm and dry.      Findings: No rash.   Neurological:      Mental Status: He is alert and oriented to person, place, and time.      Cranial Nerves: No cranial nerve deficit.      Motor: No weakness.         Consultants     Consult Orders (all) (From admission, onward)     Start     Ordered    08/11/21 1032  Inpatient Neurosurgery Consult  Once     Specialty:  Neurosurgery  Provider:  Marko Denson MD    08/11/21 1034    08/09/21 1933  Inpatient Neurology Consult General  Once     Specialty:  Neurology  Provider:  Brian Nevarez MD    08/09/21 1932    08/09/21 1611  [facility] (on-call MD unless specified) Details  Once     Specialty:  Hospitalist  Provider:  (Not yet assigned)    08/09/21 1610              Procedures       Imaging Results (All)     Procedure Component Value Units Date/Time    MRI Thoracic Spine With & Without Contrast [[id number]] Collected: 08/10/21 1729     Updated: 08/10/21 1806    Narrative:      MRI THORACIC SPINE W WO CONTRAST-, MRI LUMBAR SPINE W WO CONTRAST-, MRI  CERVICAL SPINE W WO CONTRAST-     INDICATIONS: Myelopathy     TECHNIQUE: Noncontrast and enhanced MRI of the cervical, thoracic,  lumbar spine.     COMPARISON: CT exams from 09/14/2019, 08/03/2017     FINDINGS:     No acute fracture is identified.  Multilevel endplate changes are  present, more conspicuous at C5/6, T1/2, L5/S1. Endplate changes with  enhancement noted at the endplates on both sides of T1/2, with a similar  appearance, to a lesser degree, at C5/6, that may reflect Modic 1  endplate change but there appears also to be slight disc enhancement,  that may indicate vertebral osteomyelitis/discitis, correlate  clinically, follow-up advised.     Mild anterolisthesis of C3 on C4, L5 on S1. Bilateral L5 spondylolysis.  Mild retrolisthesis of L4 on L5.      Slight increased T2 signal apparent at the level of C3/4 may represent  myomalacia or edema. Spinal cord signal is otherwise in range of normal.  No enhancing intracanalicular collections or lesions are identified.     Borderline prominence of the central canal of the mid to lower thoracic  spinal cord measuring up to 2 mm AP, of uncertain clinical significance,  follow-up suggested to exclude any possibility of developing syrinx.     The paraspinal soft tissues show mildly prominent right extrarenal  pelvis, stable in appearance from CT from 09/14/2019.     Axial levels:     C2/3: No significant disc bulge or central stenosis. Facet and  uncovertebral joint hypertrophy contribute to mild right, moderate left  neuroforaminal narrowing.     C3/4: Disc osteophyte complex and facet and uncovertebral joint  hypertrophy result in prominent central stenosis with  flattening/deformation of the spinal cord, and moderate to prominent  left more than right neuroforaminal narrowing.     C4/5: Disc osteophyte complex mildly narrows the anterior thecal space  and contributes to mild bilateral neuroforaminal narrowing.     C5/6: Disc osteophyte complex and facet and uncovertebral joint  hypertrophy result in prominent central stenosis, and mild right,  moderate left neuroforaminal narrowing.     C6/7: No significant disc bulge or central stenosis. Facet and  uncovertebral joint hypertrophy result in prominent  right, moderate left  neuroforaminal narrowing.     C7/T1: Disc osteophyte complex contributes to mild central stenosis and  uncovertebral joint hypertrophy results in prominent right, mild left  neuroforaminal narrowing.     T1/2: Mild broad-based disc bulge results in mild central stenosis,  without significant neuroforaminal narrowing.     T2/3: Broad-based disc bulge contributes to mild central stenosis.  Neuroforamina are patent.     T3/4, T4/5: No significant central or neuroforaminal stenosis.     T5/6: No significant disc bulge, central or left neuroforaminal  narrowing. Facet hypertrophy results in mild to moderate right  neuroforaminal narrowing.     T6/7: Central disc protrusion results in mild to moderate central  stenosis. The neural foramina appear patent.     T7/8: Right paracentral disc protrusion flattens the anterior right  aspect of the cord and results in mild central stenosis. The neural  foramina appear patent.     T8/9: A central, upwardly directed disc extrusion narrows the anterior  thecal space, without significant neuroforaminal narrowing.     T9/10: Broad-based disc bulge results in mild central stenosis, and  facet hypertrophy mildly narrows the right neural foramen. No  significant left neuroforaminal narrowing.     T10/11: Right paracentral disc bulge and prominent ligamentum flavum and  facet hypertrophy are present with mild central stenosis, moderate right  and mild left neuroforaminal narrowing.     T11/12: Small broad-based disc bulge slightly narrows the anterior  thecal space. No significant neuroforaminal narrowing.     T12/L1: No significant disc bulge, central or neuroforaminal stenosis.     L1/2: No significant disc bulge, central or neuroforaminal stenosis.     L2/3: Broad-based disc bulge and facet and ligamentum flavum hypertrophy  result in prominent central stenosis, mild right and moderate left  neuroforaminal narrowing.     L3/4: Broad-based disc bulge, more prominent  at the neural foramina  results in moderate to prominent central stenosis, and moderate to  prominent right more than left neuroforaminal narrowing.     L4/5: Broad-based disc bulge and disc uncoverage related to  retrolisthesis of L4 on L5 without significant central stenosis. Disc  bulging and facet and ligamentum flavum hypertrophy contribute to  prominent bilateral neuroforaminal narrowing.     L5/S1: Disc uncoverage is noted related to anterolisthesis of L5 on S1,  but no significant central stenosis. Endplate spurring and disc bulge  contribute to severe bilateral neuroforaminal narrowing.             Impression:               1. Modic 1 endplate changes versus possible discitis/osteomyelitis at  T1/2, with similar appearance less prominently at C5/6, correlate  clinically, follow-up advised.  2. Multilevel spondyloarthropathy, as detailed above. Slight increased  T2 signal apparent at the level of C3/4 may represent myelomalacia or  edema.   3. Borderline prominence of the central canal of the mid to lower  thoracic spinal cord measuring up to 2 mm AP, of uncertain clinical  significance, follow-up suggested to exclude any possibility of  developing syrinx.     Discussed by telephone with patient's nurse, Lani, at time of  interpretation, 1757, 8/10/2021.           This report was finalized on 8/10/2021 6:03 PM by Dr. Jorge Clarke M.D.       MRI Cervical Spine With & Without Contrast [[id number]] Collected: 08/10/21 1729     Updated: 08/10/21 1806    Narrative:      MRI THORACIC SPINE W WO CONTRAST-, MRI LUMBAR SPINE W WO CONTRAST-, MRI  CERVICAL SPINE W WO CONTRAST-     INDICATIONS: Myelopathy     TECHNIQUE: Noncontrast and enhanced MRI of the cervical, thoracic,  lumbar spine.     COMPARISON: CT exams from 09/14/2019, 08/03/2017     FINDINGS:     No acute fracture is identified. Multilevel endplate changes are  present, more conspicuous at C5/6, T1/2, L5/S1. Endplate changes with  enhancement noted  at the endplates on both sides of T1/2, with a similar  appearance, to a lesser degree, at C5/6, that may reflect Modic 1  endplate change but there appears also to be slight disc enhancement,  that may indicate vertebral osteomyelitis/discitis, correlate  clinically, follow-up advised.     Mild anterolisthesis of C3 on C4, L5 on S1. Bilateral L5 spondylolysis.  Mild retrolisthesis of L4 on L5.      Slight increased T2 signal apparent at the level of C3/4 may represent  myomalacia or edema. Spinal cord signal is otherwise in range of normal.  No enhancing intracanalicular collections or lesions are identified.     Borderline prominence of the central canal of the mid to lower thoracic  spinal cord measuring up to 2 mm AP, of uncertain clinical significance,  follow-up suggested to exclude any possibility of developing syrinx.     The paraspinal soft tissues show mildly prominent right extrarenal  pelvis, stable in appearance from CT from 09/14/2019.     Axial levels:     C2/3: No significant disc bulge or central stenosis. Facet and  uncovertebral joint hypertrophy contribute to mild right, moderate left  neuroforaminal narrowing.     C3/4: Disc osteophyte complex and facet and uncovertebral joint  hypertrophy result in prominent central stenosis with  flattening/deformation of the spinal cord, and moderate to prominent  left more than right neuroforaminal narrowing.     C4/5: Disc osteophyte complex mildly narrows the anterior thecal space  and contributes to mild bilateral neuroforaminal narrowing.     C5/6: Disc osteophyte complex and facet and uncovertebral joint  hypertrophy result in prominent central stenosis, and mild right,  moderate left neuroforaminal narrowing.     C6/7: No significant disc bulge or central stenosis. Facet and  uncovertebral joint hypertrophy result in prominent right, moderate left  neuroforaminal narrowing.     C7/T1: Disc osteophyte complex contributes to mild central stenosis  and  uncovertebral joint hypertrophy results in prominent right, mild left  neuroforaminal narrowing.     T1/2: Mild broad-based disc bulge results in mild central stenosis,  without significant neuroforaminal narrowing.     T2/3: Broad-based disc bulge contributes to mild central stenosis.  Neuroforamina are patent.     T3/4, T4/5: No significant central or neuroforaminal stenosis.     T5/6: No significant disc bulge, central or left neuroforaminal  narrowing. Facet hypertrophy results in mild to moderate right  neuroforaminal narrowing.     T6/7: Central disc protrusion results in mild to moderate central  stenosis. The neural foramina appear patent.     T7/8: Right paracentral disc protrusion flattens the anterior right  aspect of the cord and results in mild central stenosis. The neural  foramina appear patent.     T8/9: A central, upwardly directed disc extrusion narrows the anterior  thecal space, without significant neuroforaminal narrowing.     T9/10: Broad-based disc bulge results in mild central stenosis, and  facet hypertrophy mildly narrows the right neural foramen. No  significant left neuroforaminal narrowing.     T10/11: Right paracentral disc bulge and prominent ligamentum flavum and  facet hypertrophy are present with mild central stenosis, moderate right  and mild left neuroforaminal narrowing.     T11/12: Small broad-based disc bulge slightly narrows the anterior  thecal space. No significant neuroforaminal narrowing.     T12/L1: No significant disc bulge, central or neuroforaminal stenosis.     L1/2: No significant disc bulge, central or neuroforaminal stenosis.     L2/3: Broad-based disc bulge and facet and ligamentum flavum hypertrophy  result in prominent central stenosis, mild right and moderate left  neuroforaminal narrowing.     L3/4: Broad-based disc bulge, more prominent at the neural foramina  results in moderate to prominent central stenosis, and moderate to  prominent right more than  left neuroforaminal narrowing.     L4/5: Broad-based disc bulge and disc uncoverage related to  retrolisthesis of L4 on L5 without significant central stenosis. Disc  bulging and facet and ligamentum flavum hypertrophy contribute to  prominent bilateral neuroforaminal narrowing.     L5/S1: Disc uncoverage is noted related to anterolisthesis of L5 on S1,  but no significant central stenosis. Endplate spurring and disc bulge  contribute to severe bilateral neuroforaminal narrowing.             Impression:               1. Modic 1 endplate changes versus possible discitis/osteomyelitis at  T1/2, with similar appearance less prominently at C5/6, correlate  clinically, follow-up advised.  2. Multilevel spondyloarthropathy, as detailed above. Slight increased  T2 signal apparent at the level of C3/4 may represent myelomalacia or  edema.   3. Borderline prominence of the central canal of the mid to lower  thoracic spinal cord measuring up to 2 mm AP, of uncertain clinical  significance, follow-up suggested to exclude any possibility of  developing syrinx.     Discussed by telephone with patient's nurse, Lani, at time of  interpretation, 1757, 8/10/2021.           This report was finalized on 8/10/2021 6:03 PM by Dr. Jorge Clarke M.D.       MRI Lumbar Spine With & Without Contrast [[id number]] Collected: 08/10/21 1729     Updated: 08/10/21 1806    Narrative:      MRI THORACIC SPINE W WO CONTRAST-, MRI LUMBAR SPINE W WO CONTRAST-, MRI  CERVICAL SPINE W WO CONTRAST-     INDICATIONS: Myelopathy     TECHNIQUE: Noncontrast and enhanced MRI of the cervical, thoracic,  lumbar spine.     COMPARISON: CT exams from 09/14/2019, 08/03/2017     FINDINGS:     No acute fracture is identified. Multilevel endplate changes are  present, more conspicuous at C5/6, T1/2, L5/S1. Endplate changes with  enhancement noted at the endplates on both sides of T1/2, with a similar  appearance, to a lesser degree, at C5/6, that may reflect Modic  1  endplate change but there appears also to be slight disc enhancement,  that may indicate vertebral osteomyelitis/discitis, correlate  clinically, follow-up advised.     Mild anterolisthesis of C3 on C4, L5 on S1. Bilateral L5 spondylolysis.  Mild retrolisthesis of L4 on L5.      Slight increased T2 signal apparent at the level of C3/4 may represent  myomalacia or edema. Spinal cord signal is otherwise in range of normal.  No enhancing intracanalicular collections or lesions are identified.     Borderline prominence of the central canal of the mid to lower thoracic  spinal cord measuring up to 2 mm AP, of uncertain clinical significance,  follow-up suggested to exclude any possibility of developing syrinx.     The paraspinal soft tissues show mildly prominent right extrarenal  pelvis, stable in appearance from CT from 09/14/2019.     Axial levels:     C2/3: No significant disc bulge or central stenosis. Facet and  uncovertebral joint hypertrophy contribute to mild right, moderate left  neuroforaminal narrowing.     C3/4: Disc osteophyte complex and facet and uncovertebral joint  hypertrophy result in prominent central stenosis with  flattening/deformation of the spinal cord, and moderate to prominent  left more than right neuroforaminal narrowing.     C4/5: Disc osteophyte complex mildly narrows the anterior thecal space  and contributes to mild bilateral neuroforaminal narrowing.     C5/6: Disc osteophyte complex and facet and uncovertebral joint  hypertrophy result in prominent central stenosis, and mild right,  moderate left neuroforaminal narrowing.     C6/7: No significant disc bulge or central stenosis. Facet and  uncovertebral joint hypertrophy result in prominent right, moderate left  neuroforaminal narrowing.     C7/T1: Disc osteophyte complex contributes to mild central stenosis and  uncovertebral joint hypertrophy results in prominent right, mild left  neuroforaminal narrowing.     T1/2: Mild  broad-based disc bulge results in mild central stenosis,  without significant neuroforaminal narrowing.     T2/3: Broad-based disc bulge contributes to mild central stenosis.  Neuroforamina are patent.     T3/4, T4/5: No significant central or neuroforaminal stenosis.     T5/6: No significant disc bulge, central or left neuroforaminal  narrowing. Facet hypertrophy results in mild to moderate right  neuroforaminal narrowing.     T6/7: Central disc protrusion results in mild to moderate central  stenosis. The neural foramina appear patent.     T7/8: Right paracentral disc protrusion flattens the anterior right  aspect of the cord and results in mild central stenosis. The neural  foramina appear patent.     T8/9: A central, upwardly directed disc extrusion narrows the anterior  thecal space, without significant neuroforaminal narrowing.     T9/10: Broad-based disc bulge results in mild central stenosis, and  facet hypertrophy mildly narrows the right neural foramen. No  significant left neuroforaminal narrowing.     T10/11: Right paracentral disc bulge and prominent ligamentum flavum and  facet hypertrophy are present with mild central stenosis, moderate right  and mild left neuroforaminal narrowing.     T11/12: Small broad-based disc bulge slightly narrows the anterior  thecal space. No significant neuroforaminal narrowing.     T12/L1: No significant disc bulge, central or neuroforaminal stenosis.     L1/2: No significant disc bulge, central or neuroforaminal stenosis.     L2/3: Broad-based disc bulge and facet and ligamentum flavum hypertrophy  result in prominent central stenosis, mild right and moderate left  neuroforaminal narrowing.     L3/4: Broad-based disc bulge, more prominent at the neural foramina  results in moderate to prominent central stenosis, and moderate to  prominent right more than left neuroforaminal narrowing.     L4/5: Broad-based disc bulge and disc uncoverage related to  retrolisthesis of L4  on L5 without significant central stenosis. Disc  bulging and facet and ligamentum flavum hypertrophy contribute to  prominent bilateral neuroforaminal narrowing.     L5/S1: Disc uncoverage is noted related to anterolisthesis of L5 on S1,  but no significant central stenosis. Endplate spurring and disc bulge  contribute to severe bilateral neuroforaminal narrowing.             Impression:               1. Modic 1 endplate changes versus possible discitis/osteomyelitis at  T1/2, with similar appearance less prominently at C5/6, correlate  clinically, follow-up advised.  2. Multilevel spondyloarthropathy, as detailed above. Slight increased  T2 signal apparent at the level of C3/4 may represent myelomalacia or  edema.   3. Borderline prominence of the central canal of the mid to lower  thoracic spinal cord measuring up to 2 mm AP, of uncertain clinical  significance, follow-up suggested to exclude any possibility of  developing syrinx.     Discussed by telephone with patient's nurse, Lani, at time of  interpretation, 1757, 8/10/2021.           This report was finalized on 8/10/2021 6:03 PM by Dr. Jorge Clarke M.D.       CT Head Without Contrast [[id number]] Collected: 08/09/21 1705     Updated: 08/09/21 1710    Narrative:      CT HEAD WITHOUT CONTRAST     HISTORY: Facial numbness     COMPARISON: None     FINDINGS: The brain and ventricles are symmetrical. There is no evidence  of hemorrhage, hydrocephalus or of abnormal extra-axial fluid. No focal  area of decreased attenuation to suggest acute infarction is identified.  Moderate vascular calcification involving the carotid siphons are noted.       Impression:      No acute process identified. Further evaluation could be  performed with a MRI examination of the brain as indicated.           Radiation dose reduction techniques were utilized, including automated  exposure control and exposure modulation based on body size.     This report was finalized on  8/9/2021 5:07 PM by Dr. Duglas Baker M.D.       XR Chest 1 View [[id number]] Collected: 08/09/21 1512     Updated: 08/09/21 1516    Narrative:      XR CHEST 1 VW-     HISTORY:  Numbness, tingling.     COMPARISON:  Chest radiograph 05/24/2019.     FINDINGS:    A single portable view of the chest was obtained. The cardiac silhouette  is upper normal in size. Mediastinal and hilar contours are within  normal limits. There are low lung volumes. There is no focal  consolidation or effusion. There is asymmetric elevation of the right  hemidiaphragm. There are surgical clips projecting over the right upper  quadrant. There is multilevel degenerative disc disease. There are  suture anchors at the right humeral head.     This report was finalized on 8/9/2021 3:13 PM by Dr. Sugar Chan M.D.               Pertinent Labs     Results from last 7 days   Lab Units 08/10/21  0756 08/09/21  1451   WBC 10*3/mm3 6.26 6.65   HEMOGLOBIN g/dL 13.6 13.4   PLATELETS 10*3/mm3 187 200     Results from last 7 days   Lab Units 08/10/21  0756 08/09/21  1451   SODIUM mmol/L 136 137   POTASSIUM mmol/L 4.8 4.4   CHLORIDE mmol/L 103 103   CO2 mmol/L 24.4 25.1   BUN mg/dL 12 15   CREATININE mg/dL 0.87 0.90   GLUCOSE mg/dL 93 69   Estimated Creatinine Clearance: 84.9 mL/min (by C-G formula based on SCr of 0.87 mg/dL).  Results from last 7 days   Lab Units 08/10/21  0756 08/09/21  1451   ALBUMIN g/dL 3.60 4.10   BILIRUBIN mg/dL 0.5 0.3   ALK PHOS U/L 70 80   AST (SGOT) U/L 17 14   ALT (SGPT) U/L 14 14     Results from last 7 days   Lab Units 08/10/21  0756 08/09/21  1451   CALCIUM mg/dL 9.1 9.2   ALBUMIN g/dL 3.60 4.10   MAGNESIUM mg/dL  --  1.9       Results from last 7 days   Lab Units 08/09/21  1451   TROPONIN T ng/mL <0.010           Invalid input(s): LDLCALC      Results from last 7 days   Lab Units 08/09/21  1712   COVID19  Not Detected       Test Results Pending at Discharge       Discharge Details        Discharge Medications      Continue  These Medications      Instructions Start Date   omeprazole 40 MG capsule  Commonly known as: priLOSEC   No dose, route, or frequency recorded.      tamsulosin 0.4 MG capsule 24 hr capsule  Commonly known as: FLOMAX   1 capsule, Oral, Nightly             Allergies   Allergen Reactions   • Codeine GI Intolerance     Abdominal Pain       Discharge Disposition:  Home or Self Care      Discharge Diet:  No active diet order      Discharge Activity:       CODE STATUS:    Code Status and Medical Interventions:   Ordered at: 08/09/21 5455     Code Status:    CPR     Medical Interventions (Level of Support Prior to Arrest):    Full       No future appointments.  Additional Instructions for the Follow-ups that You Need to Schedule     Discharge Follow-up with PCP   As directed       Currently Documented PCP:    Daniel Mahan MD    PCP Phone Number:    [phone]     Follow Up Details: 1 week           Follow-up Information     Daniel Mahan MD .    Specialty: Family Medicine  Why: 1 week  Contact information:  [address]  [facility] [address] [phone]             Chayito Felton, APRN. Schedule an appointment as soon as possible for a visit in 1 week(s).    Specialties: Nurse Practitioner, Neurosurgery  Why: This week or next for follow up and possible Epidural  Contact information:  [address]  [facility] [id number] [phone]                   Additional Instructions for the Follow-ups that You Need to Schedule     Discharge Follow-up with PCP   As directed       Currently Documented PCP:    Daniel Mahan MD    PCP Phone Number:    [phone]     Follow Up Details: 1 week           Time Spent on Discharge:  Greater than 30 minutes      Vineet Alejandre MD  [facility]  08/11/21  23:27 EDT

## 2021-08-12 NOTE — TELEPHONE ENCOUNTER
Called and asked the patient about getting his FRANKLIN scheduled, to see if he had any questions about the procedure. He does not have any questions. I gave him the number to scheduling to get those scheduled. I told him we will call him toward the end of the month to get his 4 week fup appointment scheduled as our APC schedule is not currently open.     ----- Message from DEBRA Daily sent at 8/11/2021  4:56 PM EDT -----  Regarding: f/up  Please set up an FRANKLIN for this patient, as an outpatient and then he can f/up with us in about 4 weeks, if interested.  He was in the hospital for back pain with leg stinging, burning, bilaterally. Thanks!

## 2021-08-12 NOTE — CASE MANAGEMENT/SOCIAL WORK
Case Management Discharge Note      Final Note: Pt was dc'd home         Selected Continued Care - Discharged on 8/11/2021 Admission date: 8/9/2021 - Discharge disposition: Home or Self Care    Destination    No services have been selected for the patient.              Durable Medical Equipment    No services have been selected for the patient.              Dialysis/Infusion    No services have been selected for the patient.              Home Medical Care    No services have been selected for the patient.              Therapy    No services have been selected for the patient.              Community Resources    No services have been selected for the patient.              Community & DME    No services have been selected for the patient.                  Transportation Services  Private: Car    Final Discharge Disposition Code: 01 - home or self-care

## 2021-08-24 ENCOUNTER — ANESTHESIA EVENT (OUTPATIENT)
Dept: PAIN MEDICINE | Facility: HOSPITAL | Age: 71
End: 2021-08-24

## 2021-08-24 ENCOUNTER — HOSPITAL ENCOUNTER (OUTPATIENT)
Dept: PAIN MEDICINE | Facility: HOSPITAL | Age: 71
Discharge: HOME | End: 2021-08-24

## 2021-08-24 ENCOUNTER — ANESTHESIA (OUTPATIENT)
Dept: PAIN MEDICINE | Facility: HOSPITAL | Age: 71
End: 2021-08-24

## 2021-08-24 ENCOUNTER — HOSPITAL ENCOUNTER (OUTPATIENT)
Dept: GENERAL RADIOLOGY | Facility: HOSPITAL | Age: 71
Discharge: HOME | End: 2021-08-24

## 2021-08-24 VITALS
TEMPERATURE: 97.1 F | RESPIRATION RATE: 16 BRPM | HEART RATE: 72 BPM | SYSTOLIC BLOOD PRESSURE: 127 MMHG | DIASTOLIC BLOOD PRESSURE: 84 MMHG | OXYGEN SATURATION: 94 %

## 2021-08-24 DIAGNOSIS — R52 PAIN: ICD-10-CM

## 2021-08-24 DIAGNOSIS — R20.0 NUMBNESS AND TINGLING OF BOTH FEET: ICD-10-CM

## 2021-08-24 DIAGNOSIS — M48.061 SPINAL STENOSIS OF LUMBAR REGION WITHOUT NEUROGENIC CLAUDICATION: ICD-10-CM

## 2021-08-24 DIAGNOSIS — M51.36 DDD (DEGENERATIVE DISC DISEASE), LUMBAR: Primary | ICD-10-CM

## 2021-08-24 DIAGNOSIS — R20.2 NUMBNESS AND TINGLING OF BOTH FEET: ICD-10-CM

## 2021-08-24 PROCEDURE — 25010000002 METHYLPREDNISOLONE PER 80 MG: Performed by: ANESTHESIOLOGY

## 2021-08-24 PROCEDURE — 77003 FLUOROGUIDE FOR SPINE INJECT: CPT

## 2021-08-24 PROCEDURE — 0 IOPAMIDOL 41 % SOLUTION: Performed by: ANESTHESIOLOGY

## 2021-08-24 RX ORDER — METHYLPREDNISOLONE ACETATE 80 MG/ML
80 INJECTION, SUSPENSION INTRA-ARTICULAR; INTRALESIONAL; INTRAMUSCULAR; SOFT TISSUE ONCE
Status: COMPLETED | OUTPATIENT
Start: 2021-08-24 | End: 2021-08-24

## 2021-08-24 RX ORDER — FENTANYL CITRATE 50 UG/ML
50 INJECTION, SOLUTION INTRAMUSCULAR; INTRAVENOUS AS NEEDED
Status: DISCONTINUED | OUTPATIENT
Start: 2021-08-24 | End: 2021-08-25 | Stop reason: HOSPADM

## 2021-08-24 RX ORDER — SODIUM CHLORIDE 0.9 % (FLUSH) 0.9 %
1-10 SYRINGE (ML) INJECTION AS NEEDED
Status: DISCONTINUED | OUTPATIENT
Start: 2021-08-24 | End: 2021-08-25 | Stop reason: HOSPADM

## 2021-08-24 RX ORDER — MIDAZOLAM HYDROCHLORIDE 1 MG/ML
1 INJECTION INTRAMUSCULAR; INTRAVENOUS AS NEEDED
Status: DISCONTINUED | OUTPATIENT
Start: 2021-08-24 | End: 2021-08-25 | Stop reason: HOSPADM

## 2021-08-24 RX ORDER — LIDOCAINE HYDROCHLORIDE 10 MG/ML
1 INJECTION, SOLUTION INFILTRATION; PERINEURAL ONCE AS NEEDED
Status: DISCONTINUED | OUTPATIENT
Start: 2021-08-24 | End: 2021-08-25 | Stop reason: HOSPADM

## 2021-08-24 RX ADMIN — METHYLPREDNISOLONE ACETATE 80 MG: 80 INJECTION, SUSPENSION INTRA-ARTICULAR; INTRALESIONAL; INTRAMUSCULAR; SOFT TISSUE at 12:28

## 2021-08-24 RX ADMIN — IOPAMIDOL 10 ML: 408 INJECTION, SOLUTION INTRATHECAL at 12:28

## 2021-08-24 NOTE — H&P
[facility]    History and Physical    Patient Name: Feliciano Noguera  :  1950  MRN:  [id number]  Date of Admission: 2021    Subjective     Patient is a 71 y.o. male presents with chief complaint of acute low back, hips: bilateral, foot: bilateral and leg: bilateral pain, numbness and burning sensation. He was briefly admitted to rule out any major CNS disorders. .  Onset of symptoms was abrupt starting 3 weeks ago.  Symptoms are associated/aggravated by nothing in particular or standing. Symptoms improve with nothing his MRI report showed multi level degeneration with central stenosis at L3-4 and neuroforaminal stenosis, severe at L5-S1. Dr Gomes suggested epidurals.    The following portions of the patients history were reviewed and updated as appropriate: current medications, allergies, past medical history, past surgical history, past family history, past social history and problem list                Objective     Past Medical History:   Past Medical History:   Diagnosis Date   • Arthritis    • Enlarged prostate    • GERD (gastroesophageal reflux disease)    • [place] (hard of hearing)    • Knee pain, right      Past Surgical History:   Past Surgical History:   Procedure Laterality Date   • CHOLECYSTECTOMY WITH INTRAOPERATIVE CHOLANGIOGRAM N/A 2017    Procedure: CHOLECYSTECTOMY LAPAROSCOPIC INTRAOPERATIVE CHOLANGIOGRAM;  Surgeon: Bola Patel MD;  Location: [facility] OR [facility];  Service:    • COLONOSCOPY N/A 2009    Dr. Jamison Johnson   • COLONOSCOPY N/A 2018    Procedure: COLONOSCOPY into cecum with biopsy;  Surgeon: Bola Patel MD;  Location: [facility] ENDOSCOPY;  Service: General   • ENDOSCOPY N/A 12/15/2008    Dr. Jamison Johnson   • ENDOSCOPY N/A 2018    Procedure: ESOPHAGOGASTRODUODENOSCOPY with biopsy;  Surgeon: Bola Patel MD;  Location: [facility] ENDOSCOPY;  Service: General   • KNEE ARTHROSCOPY Left    • PROSTATE BIOPSY     • ROTATOR CUFF REPAIR Right  09/29/2009    Dr. Arik Harris   • TOTAL KNEE ARTHROPLASTY Right 5/28/2019    Procedure: RIGHT TOTAL KNEE ARTHROPLASTY;  Surgeon: Bola Ramires MD;  Location: [facility];  Service: Orthopedics     Family History:   Family History   Problem Relation Age of Onset   • Diabetes Mother    • Tuberculosis Father    • Breast cancer Sister    • Bone cancer Sister    • Lung cancer Brother    • Diabetes Brother    • Heart disease Brother    • Malig Hyperthermia Neg Hx      Social History:   Social History     Socioeconomic History   • Marital status:      Spouse name: Not on file   • Number of children: Not on file   • Years of education: Not on file   • Highest education level: Not on file   Tobacco Use   • Smoking status: Current Every Day Smoker     Packs/day: 0.00     Types: Cigars   • Smokeless tobacco: Never Used   • Tobacco comment: 2 CIGARS PER DAY, SOMETIMES LESS   Vaping Use   • Vaping Use: Never used   Substance and Sexual Activity   • Alcohol use: No   • Drug use: No   • Sexual activity: Defer       Vital Signs Range for the last 24 hours  Temperature: Temp:  [36.2 °C (97.1 °F)] 36.2 °C (97.1 °F)   Temp Source: Temp src: Infrared   BP: BP: (134)/(88) 134/88   Pulse: Heart Rate:  [62] 62   Respirations: Resp:  [16] 16   SPO2: SpO2:  [96 %] 96 %   O2 Amount (l/min):     O2 Devices Device (Oxygen Therapy): room air   Weight:           --------------------------------------------------------------------------------    Current Outpatient Medications   Medication Sig Dispense Refill   • omeprazole (priLOSEC) 40 MG capsule      • tamsulosin (FLOMAX) 0.4 MG capsule 24 hr capsule Take 1 capsule by mouth Every Night.       Current Facility-Administered Medications   Medication Dose Route Frequency Provider Last Rate Last Admin   • fentaNYL citrate (PF) (SUBLIMAZE) injection 50 mcg  50 mcg Intravenous PRN Aleks Chau MD       • iopamidol (ISOVUE-M 200) injection 41%  12 mL Epidural Once in imaging Smith,  MD Aleks       • lidocaine (XYLOCAINE) 1 % injection 1 mL  1 mL Intradermal Once PRN Aleks Chau MD       • methylPREDNISolone acetate (DEPO-medrol) injection 80 mg  80 mg Intra-articular Once Aleks Chau MD       • midazolam (VERSED) injection 1 mg  1 mg Intravenous PRN Aleks Chau MD       • sodium chloride 0.9 % flush 1-10 mL  1-10 mL Intravenous PRN Aleks Chau MD           --------------------------------------------------------------------------------  Assessment/Plan      Anesthesia Evaluation                  Airway   Mallampati: III  Dental - normal exam     Pulmonary - normal exam   Cardiovascular - normal exam        Neuro/Psych  (+) abnormal reflex,     (-) strength not normal in all 4 extremities, left straight leg raise test, right straight leg raise test  GI/Hepatic/Renal/Endo      Musculoskeletal     Abdominal    Substance History      OB/GYN          Other                   Diagnosis and Plan    Treatment Plan  ASA 3      Procedures: With fluoroscopy,       Anesthetic plan and risks discussed with patient.          Diagnosis     * Lumbar degenerative disc disease [M51.36]     * Lumbar spinal stenosis [M48.061]     * Lumbar foraminal stenosis [M48.061]

## 2021-08-24 NOTE — ANESTHESIA PROCEDURE NOTES
PAIN Epidural block      Patient reassessed immediately prior to procedure    Patient location during procedure: pain clinic  Indication:procedure for pain  Performed By  Anesthesiologist: Aleks Chau MD  Preanesthetic Checklist  Completed: patient identified, IV checked, site marked, risks and benefits discussed, surgical consent, monitors and equipment checked, pre-op evaluation and timeout performed  Additional Notes  Performed under fluoroscopy  Post-Op Diagnosis Codes:     * Lumbar degenerative disc disease (M51.36)     * Lumbar spinal stenosis (M48.061)     * Lumbar foraminal stenosis (M48.061)  Prep:  Pt Position:prone  Sterile Tech:cap, gloves, mask and sterile barrier  Prep:chlorhexidine gluconate and isopropyl alcohol  Monitoring:blood pressure monitoring, continuous pulse oximetry and EKG  Procedure:Sedation: no     Approach:midline  Guidance: fluoroscopy  Location:lumbar (Intralaminar)  Level:L5-S1  Needle Type:Tuohy  Needle Gauge:20 G  Aspiration:negative  Medications:  Depomedrol:80 mg  Preservative Free Saline:4mL  Isovue:3mL  Comments:Needle placement confirmed with epidural spread of contrast injection.  Post Assessment:  Post-procedure: Bandaid.  Pt Tolerance:patient tolerated the procedure well with no apparent complications  Complications:no

## 2023-11-07 ENCOUNTER — HOSPITAL ENCOUNTER (OUTPATIENT)
Dept: GENERAL RADIOLOGY | Facility: HOSPITAL | Age: 73
Discharge: HOME | End: 2023-11-07
Payer: MEDICARE

## 2023-11-07 ENCOUNTER — PRE-ADMISSION TESTING (OUTPATIENT)
Dept: PREADMISSION TESTING | Facility: HOSPITAL | Age: 73
End: 2023-11-07
Payer: MEDICARE

## 2023-11-07 VITALS
WEIGHT: 174.2 LBS | HEART RATE: 84 BPM | HEIGHT: 68 IN | DIASTOLIC BLOOD PRESSURE: 76 MMHG | TEMPERATURE: 97.2 F | SYSTOLIC BLOOD PRESSURE: 123 MMHG | OXYGEN SATURATION: 96 % | RESPIRATION RATE: 16 BRPM | BODY MASS INDEX: 26.4 KG/M2

## 2023-11-07 LAB
ALBUMIN SERPL-MCNC: 4.2 G/DL (ref 3.5–5.2)
ALBUMIN/GLOB SERPL: 1.9 G/DL
ALP SERPL-CCNC: 85 U/L (ref 39–117)
ALT SERPL W P-5'-P-CCNC: 14 U/L (ref 1–41)
ANION GAP SERPL CALCULATED.3IONS-SCNC: 10.5 MMOL/L (ref 5–15)
AST SERPL-CCNC: 16 U/L (ref 1–40)
BILIRUB SERPL-MCNC: 0.4 MG/DL (ref 0–1.2)
BILIRUB UR QL STRIP: NEGATIVE
BUN SERPL-MCNC: 17 MG/DL (ref 8–23)
BUN/CREAT SERPL: 17.5 (ref 7–25)
CALCIUM SPEC-SCNC: 9.2 MG/DL (ref 8.6–10.5)
CHLORIDE SERPL-SCNC: 105 MMOL/L (ref 98–107)
CLARITY UR: CLEAR
CO2 SERPL-SCNC: 23.5 MMOL/L (ref 22–29)
COLOR UR: YELLOW
CREAT SERPL-MCNC: 0.97 MG/DL (ref 0.76–1.27)
DEPRECATED RDW RBC AUTO: 38.8 FL (ref 37–54)
EGFRCR SERPLBLD CKD-EPI 2021: 81.4 ML/MIN/1.73
ERYTHROCYTE [DISTWIDTH] IN BLOOD BY AUTOMATED COUNT: 12.5 % (ref 12.3–15.4)
GLOBULIN UR ELPH-MCNC: 2.2 GM/DL
GLUCOSE SERPL-MCNC: 110 MG/DL (ref 65–99)
GLUCOSE UR STRIP-MCNC: NEGATIVE MG/DL
HCT VFR BLD AUTO: 40 % (ref 37.5–51)
HGB BLD-MCNC: 13.4 G/DL (ref 13–17.7)
HGB UR QL STRIP.AUTO: NEGATIVE
HOLD SPECIMEN: NORMAL
INR PPP: 0.98 (ref 0.9–1.1)
KETONES UR QL STRIP: NEGATIVE
LEUKOCYTE ESTERASE UR QL STRIP.AUTO: NEGATIVE
MCH RBC QN AUTO: 28.8 PG (ref 26.6–33)
MCHC RBC AUTO-ENTMCNC: 33.5 G/DL (ref 31.5–35.7)
MCV RBC AUTO: 86 FL (ref 79–97)
NITRITE UR QL STRIP: NEGATIVE
PH UR STRIP.AUTO: 5.5 [PH] (ref 5–8)
PLATELET # BLD AUTO: 205 10*3/MM3 (ref 140–450)
PMV BLD AUTO: 10.2 FL (ref 6–12)
POTASSIUM SERPL-SCNC: 4.2 MMOL/L (ref 3.5–5.2)
PROT SERPL-MCNC: 6.4 G/DL (ref 6–8.5)
PROT UR QL STRIP: NEGATIVE
PROTHROMBIN TIME: 13.1 SECONDS (ref 11.7–14.2)
QT INTERVAL: 431 MS
QTC INTERVAL: 445 MS
RBC # BLD AUTO: 4.65 10*6/MM3 (ref 4.14–5.8)
SODIUM SERPL-SCNC: 139 MMOL/L (ref 136–145)
SP GR UR STRIP: 1.03 (ref 1–1.03)
UROBILINOGEN UR QL STRIP: NORMAL
WBC NRBC COR # BLD: 6.04 10*3/MM3 (ref 3.4–10.8)

## 2023-11-07 PROCEDURE — 85610 PROTHROMBIN TIME: CPT

## 2023-11-07 PROCEDURE — 80053 COMPREHEN METABOLIC PANEL: CPT

## 2023-11-07 PROCEDURE — 85027 COMPLETE CBC AUTOMATED: CPT

## 2023-11-07 PROCEDURE — 93010 ELECTROCARDIOGRAM REPORT: CPT | Performed by: INTERNAL MEDICINE

## 2023-11-07 PROCEDURE — 81003 URINALYSIS AUTO W/O SCOPE: CPT

## 2023-11-07 PROCEDURE — 73560 X-RAY EXAM OF KNEE 1 OR 2: CPT

## 2023-11-07 PROCEDURE — 36415 COLL VENOUS BLD VENIPUNCTURE: CPT

## 2023-11-07 PROCEDURE — 71046 X-RAY EXAM CHEST 2 VIEWS: CPT

## 2023-11-07 PROCEDURE — 93005 ELECTROCARDIOGRAM TRACING: CPT

## 2023-11-07 RX ORDER — OXYCODONE HYDROCHLORIDE AND ACETAMINOPHEN 5; 325 MG/1; MG/1
TABLET ORAL
COMMUNITY

## 2023-11-07 RX ORDER — MELOXICAM 15 MG/1
TABLET ORAL
COMMUNITY

## 2023-11-07 RX ORDER — PREDNISONE 20 MG/1
TABLET ORAL
COMMUNITY
End: 2023-11-07

## 2023-11-07 RX ORDER — METHYLPREDNISOLONE 4 MG/1
TABLET ORAL
COMMUNITY
Start: 2023-10-27 | End: 2023-11-07

## 2023-11-07 NOTE — DISCHARGE INSTRUCTIONS
Take the following medications the morning of surgery: OMEPRAZOLE    ARRIVAL TIME : HOSPITAL WILL CALL 1-2 DAYS PRIOR       If you are on prescription narcotic pain medication to control your pain you may also take that medication the morning of surgery.    General Instructions:  Do not eat solid food after midnight the night before surgery.  You may drink clear liquids day of surgery but must stop at least one hour before your hospital arrival time.  It is beneficial for you to have a clear drink that contains carbohydrates the day of surgery.  We suggest a 12 to 20 ounce bottle of Gatorade or Powerade for non-diabetic patients or a 12 to 20 ounce bottle of G2 or Powerade Zero for diabetic patients. (Pediatric patients, are not advised to drink a 12 to 20 ounce carbohydrate drink)    Clear liquids are liquids you can see through.  Nothing red in color.     Plain water                               Sports drinks  Sodas                                   Gelatin (Jell-O)  Fruit juices without pulp such as white grape juice and apple juice  Popsicles that contain no fruit or yogurt  Tea or coffee (no cream or milk added)  Gatorade / Powerade  G2 / Powerade Zero  Patients who avoid smoking, chewing tobacco and alcohol for 4 weeks prior to surgery have a reduced risk of post-operative complications.  Quit smoking as many days before surgery as you can.  Do not smoke, use chewing tobacco or drink alcohol the day of surgery.   If applicable bring your C-PAP/ BI-PAP machine in with you to preop day of surgery.  Bring any papers given to you in the doctor’s office.  Wear clean comfortable clothes.  Do not wear contact lenses, false eyelashes or make-up.  Bring a case for your glasses.   Bring crutches or walker if applicable.  Remove all piercings.  Leave jewelry and any other valuables at home.  Hair extensions with metal clips must be removed prior to surgery.  The Pre-Admission Testing nurse will instruct you to bring  medications if unable to obtain an accurate list in Pre-Admission Testing.            Preventing a Surgical Site Infection:  For 2 to 3 days before surgery, avoid shaving with a razor because the razor can irritate skin and make it easier to develop an infection.    Any areas of open skin can increase the risk of a post-operative wound infection by allowing bacteria to enter and travel throughout the body.  Notify your surgeon if you have any skin wounds / rashes even if it is not near the expected surgical site.  The area will need assessed to determine if surgery should be delayed until it is healed.  The night prior to surgery shower using a fresh bar of anti-bacterial soap (such as Dial) and clean washcloth.  Sleep in a clean bed with clean clothing.  Do not allow pets to sleep with you.  Shower on the morning of surgery using a fresh bar of anti-bacterial soap (such as Dial) and clean washcloth.  Dry with a clean towel and dress in clean clothing.  Ask your surgeon if you will be receiving antibiotics prior to surgery.  Make sure you, your family, and all healthcare providers clean their hands with soap and water or an alcohol based hand  before caring for you or your wound.    Day of surgery:  Your arrival time is approximately two hours before your scheduled surgery time.  Upon arrival, a Pre-op nurse and Anesthesiologist will review your health history, obtain vital signs, and answer questions you may have.  The only belongings needed at this time will be a list of your home medications and if applicable your C-PAP/BI-PAP machine.  A Pre-op nurse will start an IV and you may receive medication in preparation for surgery, including something to help you relax.     Please be aware that surgery does come with discomfort.  We want to make every effort to control your discomfort so please discuss any uncontrolled symptoms with your nurse.   Your doctor will most likely have prescribed pain medications.       If you are going home after surgery you will receive individualized written care instructions before being discharged.  A responsible adult must drive you to and from the hospital on the day of your surgery and stay with you for 24 hours.  Discharge prescriptions can be filled by the hospital pharmacy during regular pharmacy hours.  If you are having surgery late in the day/evening your prescription may be e-prescribed to your pharmacy.  Please verify your pharmacy hours or chose a 24 hour pharmacy to avoid not having access to your prescription because your pharmacy has closed for the day.    If you are staying overnight following surgery, you will be transported to your hospital room following the recovery period.  [facility] has all private rooms.    If you have any questions please call Pre-Admission Testing at [phone].  Deductibles and co-payments are collected on the day of service. Please be prepared to pay the required co-pay, deductible or deposit on the day of service as defined by your plan.    Call your surgeon immediately if you experience any of the following symptoms:  Sore Throat  Shortness of Breath or difficulty breathing  Cough  Chills  Body soreness or muscle pain  Headache  Fever  New loss of taste or smell  Do not arrive for your surgery ill.  Your procedure will need to be rescheduled to another time.  You will need to call your physician before the day of surgery to avoid any unnecessary exposure to hospital staff as well as other patients.    CHLORHEXIDINE CLOTH INSTRUCTIONS  The morning of surgery follow these instructions using the Chlorhexidine cloths you've been given.  These steps reduce bacteria on the body.  Do not use the cloths near your eyes, ears mouth, genitalia or on open wounds.  Throw the cloths away after use but do not try to flush them down a toilet.      Open and remove one cloth at a time from the package.    Leave the cloth unfolded and begin the  bathing.  Massage the skin with the cloths using gentle pressure to remove bacteria.  Do not scrub harshly.   Follow the steps below with one 2% CHG cloth per area (6 total cloths).  One cloth for neck, shoulders and chest.  One cloth for both arms, hands, fingers and underarms (do underarms last).  One cloth for the abdomen followed by groin.  One cloth for right leg and foot including between the toes.  One cloth for left leg and foot including between the toes.  The last cloth is to be used for the back of the neck, back and buttocks.    Allow the CHG to air dry 3 minutes on the skin which will give it time to work and decrease the chance of irritation.  The skin may feel sticky until it is dry.  Do not rinse with water or any other liquid or you will lose the beneficial effects of the CHG.  If mild skin irritation occurs, do rinse the skin to remove the CHG.  Report this to the nurse at time of admission.  Do not apply lotions, creams, ointments, deodorants or perfumes after using the clothes. Dress in clean clothes before coming to the hospital.

## 2023-11-14 ENCOUNTER — ANESTHESIA (OUTPATIENT)
Dept: PERIOP | Facility: HOSPITAL | Age: 73
End: 2023-11-14
Payer: MEDICARE

## 2023-11-14 ENCOUNTER — HOSPITAL ENCOUNTER (OUTPATIENT)
Facility: HOSPITAL | Age: 73
Setting detail: OBSERVATION
Discharge: HOME | End: 2023-11-15
Attending: ORTHOPAEDIC SURGERY | Admitting: ORTHOPAEDIC SURGERY
Payer: MEDICARE

## 2023-11-14 ENCOUNTER — APPOINTMENT (OUTPATIENT)
Dept: GENERAL RADIOLOGY | Facility: HOSPITAL | Age: 73
End: 2023-11-14
Payer: MEDICARE

## 2023-11-14 ENCOUNTER — ANESTHESIA EVENT (OUTPATIENT)
Dept: PERIOP | Facility: HOSPITAL | Age: 73
End: 2023-11-14
Payer: MEDICARE

## 2023-11-14 DIAGNOSIS — M17.12 PRIMARY OSTEOARTHRITIS OF LEFT KNEE: Primary | ICD-10-CM

## 2023-11-14 PROCEDURE — C1776 JOINT DEVICE (IMPLANTABLE): HCPCS | Performed by: ORTHOPAEDIC SURGERY

## 2023-11-14 PROCEDURE — 25010000002 DEXAMETHASONE SODIUM PHOSPHATE 20 MG/5ML SOLUTION: Performed by: ANESTHESIOLOGY

## 2023-11-14 PROCEDURE — 25010000002 FENTANYL CITRATE (PF) 50 MCG/ML SOLUTION: Performed by: ANESTHESIOLOGY

## 2023-11-14 PROCEDURE — G0378 HOSPITAL OBSERVATION PER HR: HCPCS

## 2023-11-14 PROCEDURE — 25010000002 MORPHINE PER 10 MG: Performed by: ORTHOPAEDIC SURGERY

## 2023-11-14 PROCEDURE — 25010000002 ONDANSETRON PER 1 MG: Performed by: ANESTHESIOLOGY

## 2023-11-14 PROCEDURE — A9270 NON-COVERED ITEM OR SERVICE: HCPCS | Performed by: ANESTHESIOLOGY

## 2023-11-14 PROCEDURE — C1713 ANCHOR/SCREW BN/BN,TIS/BN: HCPCS | Performed by: ORTHOPAEDIC SURGERY

## 2023-11-14 PROCEDURE — 25010000002 SUGAMMADEX 200 MG/2ML SOLUTION: Performed by: ANESTHESIOLOGY

## 2023-11-14 PROCEDURE — 25010000002 PROPOFOL 200 MG/20ML EMULSION: Performed by: ANESTHESIOLOGY

## 2023-11-14 PROCEDURE — 25010000002 HYDROMORPHONE 1 MG/ML SOLUTION: Performed by: ANESTHESIOLOGY

## 2023-11-14 PROCEDURE — 25010000002 EPINEPHRINE 1 MG/ML SOLUTION 30 ML VIAL: Performed by: ORTHOPAEDIC SURGERY

## 2023-11-14 PROCEDURE — A9270 NON-COVERED ITEM OR SERVICE: HCPCS | Performed by: ORTHOPAEDIC SURGERY

## 2023-11-14 PROCEDURE — 25810000003 LACTATED RINGERS PER 1000 ML: Performed by: ANESTHESIOLOGY

## 2023-11-14 PROCEDURE — 25010000002 CEFAZOLIN PER 500 MG: Performed by: ANESTHESIOLOGY

## 2023-11-14 PROCEDURE — 25010000002 MAGNESIUM SULFATE PER 500 MG OF MAGNESIUM: Performed by: ANESTHESIOLOGY

## 2023-11-14 PROCEDURE — 63710000001 HYDROCODONE-ACETAMINOPHEN 7.5-325 MG TABLET: Performed by: ANESTHESIOLOGY

## 2023-11-14 PROCEDURE — 63710000001 OXYCODONE-ACETAMINOPHEN 5-325 MG TABLET: Performed by: ORTHOPAEDIC SURGERY

## 2023-11-14 PROCEDURE — 73560 X-RAY EXAM OF KNEE 1 OR 2: CPT

## 2023-11-14 PROCEDURE — 25010000002 KETOROLAC TROMETHAMINE PER 15 MG: Performed by: ORTHOPAEDIC SURGERY

## 2023-11-14 PROCEDURE — 63710000001 POVIDONE-IODINE 10 % SOLUTION 30 ML BOTTLE: Performed by: ORTHOPAEDIC SURGERY

## 2023-11-14 PROCEDURE — 63710000001 TAMSULOSIN 0.4 MG CAPSULE: Performed by: ORTHOPAEDIC SURGERY

## 2023-11-14 PROCEDURE — 25010000002 CEFAZOLIN IN DEXTROSE 2-4 GM/100ML-% SOLUTION: Performed by: ORTHOPAEDIC SURGERY

## 2023-11-14 PROCEDURE — 25010000002 ROPIVACAINE PER 1 MG: Performed by: ORTHOPAEDIC SURGERY

## 2023-11-14 DEVICE — IMPLANTABLE DEVICE
Type: IMPLANTABLE DEVICE | Site: KNEE | Status: FUNCTIONAL
Brand: BIOMET® KNEE SYSTEM

## 2023-11-14 DEVICE — CAP TOTL KN CMT PRIMARY: Type: IMPLANTABLE DEVICE | Status: FUNCTIONAL

## 2023-11-14 DEVICE — IMPLANTABLE DEVICE
Type: IMPLANTABLE DEVICE | Site: KNEE | Status: FUNCTIONAL
Brand: VANGUARD KNEE SYSTEM

## 2023-11-14 DEVICE — IMPLANTABLE DEVICE
Type: IMPLANTABLE DEVICE | Site: KNEE | Status: FUNCTIONAL
Brand: VANGUARD® KNEE SYSTEM

## 2023-11-14 DEVICE — IMPLANTABLE DEVICE
Type: IMPLANTABLE DEVICE | Site: KNEE | Status: FUNCTIONAL
Brand: BIOMET® BONE CEMENT R

## 2023-11-14 RX ORDER — DEXMEDETOMIDINE HYDROCHLORIDE 100 UG/ML
INJECTION, SOLUTION INTRAVENOUS AS NEEDED
Status: DISCONTINUED | OUTPATIENT
Start: 2023-11-14 | End: 2023-11-14 | Stop reason: SURG

## 2023-11-14 RX ORDER — MORPHINE SULFATE 2 MG/ML
4 INJECTION, SOLUTION INTRAMUSCULAR; INTRAVENOUS
Status: DISCONTINUED | OUTPATIENT
Start: 2023-11-14 | End: 2023-11-15 | Stop reason: HOSPADM

## 2023-11-14 RX ORDER — OXYCODONE HYDROCHLORIDE AND ACETAMINOPHEN 5; 325 MG/1; MG/1
1-2 TABLET ORAL EVERY 4 HOURS PRN
Qty: 42 TABLET | Refills: 0 | Status: SHIPPED | OUTPATIENT
Start: 2023-11-14

## 2023-11-14 RX ORDER — KETOROLAC TROMETHAMINE 15 MG/ML
15 INJECTION, SOLUTION INTRAMUSCULAR; INTRAVENOUS EVERY 6 HOURS PRN
Status: DISCONTINUED | OUTPATIENT
Start: 2023-11-14 | End: 2023-11-15 | Stop reason: HOSPADM

## 2023-11-14 RX ORDER — HYDROCODONE BITARTRATE AND ACETAMINOPHEN 5; 325 MG/1; MG/1
1 TABLET ORAL ONCE AS NEEDED
Status: DISCONTINUED | OUTPATIENT
Start: 2023-11-14 | End: 2023-11-14 | Stop reason: HOSPADM

## 2023-11-14 RX ORDER — DIPHENHYDRAMINE HYDROCHLORIDE 50 MG/ML
12.5 INJECTION INTRAMUSCULAR; INTRAVENOUS EVERY 6 HOURS PRN
Status: DISCONTINUED | OUTPATIENT
Start: 2023-11-14 | End: 2023-11-15 | Stop reason: HOSPADM

## 2023-11-14 RX ORDER — SODIUM CHLORIDE 9 MG/ML
40 INJECTION, SOLUTION INTRAVENOUS AS NEEDED
Status: DISCONTINUED | OUTPATIENT
Start: 2023-11-14 | End: 2023-11-15 | Stop reason: HOSPADM

## 2023-11-14 RX ORDER — NALOXONE HCL 0.4 MG/ML
0.2 VIAL (ML) INJECTION AS NEEDED
Status: DISCONTINUED | OUTPATIENT
Start: 2023-11-14 | End: 2023-11-14 | Stop reason: HOSPADM

## 2023-11-14 RX ORDER — CHOLECALCIFEROL (VITAMIN D3) 125 MCG
5 CAPSULE ORAL NIGHTLY PRN
Status: DISCONTINUED | OUTPATIENT
Start: 2023-11-14 | End: 2023-11-15 | Stop reason: HOSPADM

## 2023-11-14 RX ORDER — ONDANSETRON 2 MG/ML
INJECTION INTRAMUSCULAR; INTRAVENOUS AS NEEDED
Status: DISCONTINUED | OUTPATIENT
Start: 2023-11-14 | End: 2023-11-14 | Stop reason: SURG

## 2023-11-14 RX ORDER — PROMETHAZINE HYDROCHLORIDE 12.5 MG/1
12.5 TABLET ORAL EVERY 6 HOURS PRN
Status: DISCONTINUED | OUTPATIENT
Start: 2023-11-14 | End: 2023-11-15 | Stop reason: HOSPADM

## 2023-11-14 RX ORDER — SODIUM CHLORIDE 0.9 % (FLUSH) 0.9 %
3-10 SYRINGE (ML) INJECTION AS NEEDED
Status: DISCONTINUED | OUTPATIENT
Start: 2023-11-14 | End: 2023-11-14 | Stop reason: HOSPADM

## 2023-11-14 RX ORDER — DROPERIDOL 2.5 MG/ML
0.62 INJECTION, SOLUTION INTRAMUSCULAR; INTRAVENOUS
Status: DISCONTINUED | OUTPATIENT
Start: 2023-11-14 | End: 2023-11-14 | Stop reason: HOSPADM

## 2023-11-14 RX ORDER — CLINDAMYCIN PHOSPHATE 900 MG/50ML
900 INJECTION INTRAVENOUS EVERY 8 HOURS
Status: COMPLETED | OUTPATIENT
Start: 2023-11-14 | End: 2023-11-15

## 2023-11-14 RX ORDER — EPHEDRINE SULFATE 50 MG/ML
5 INJECTION, SOLUTION INTRAVENOUS ONCE AS NEEDED
Status: DISCONTINUED | OUTPATIENT
Start: 2023-11-14 | End: 2023-11-14 | Stop reason: HOSPADM

## 2023-11-14 RX ORDER — DOCUSATE SODIUM 250 MG
250 CAPSULE ORAL 2 TIMES DAILY PRN
Qty: 30 CAPSULE | Refills: 1 | Status: SHIPPED | OUTPATIENT
Start: 2023-11-14

## 2023-11-14 RX ORDER — SUCCINYLCHOLINE/SOD CL,ISO/PF 200MG/10ML
SYRINGE (ML) INTRAVENOUS AS NEEDED
Status: DISCONTINUED | OUTPATIENT
Start: 2023-11-14 | End: 2023-11-14 | Stop reason: SURG

## 2023-11-14 RX ORDER — DEXAMETHASONE SODIUM PHOSPHATE 4 MG/ML
INJECTION, SOLUTION INTRA-ARTICULAR; INTRALESIONAL; INTRAMUSCULAR; INTRAVENOUS; SOFT TISSUE AS NEEDED
Status: DISCONTINUED | OUTPATIENT
Start: 2023-11-14 | End: 2023-11-14 | Stop reason: SURG

## 2023-11-14 RX ORDER — DIPHENHYDRAMINE HYDROCHLORIDE 50 MG/ML
12.5 INJECTION INTRAMUSCULAR; INTRAVENOUS
Status: DISCONTINUED | OUTPATIENT
Start: 2023-11-14 | End: 2023-11-14 | Stop reason: HOSPADM

## 2023-11-14 RX ORDER — FLUMAZENIL 0.1 MG/ML
0.2 INJECTION INTRAVENOUS AS NEEDED
Status: DISCONTINUED | OUTPATIENT
Start: 2023-11-14 | End: 2023-11-14 | Stop reason: HOSPADM

## 2023-11-14 RX ORDER — OXYCODONE HYDROCHLORIDE AND ACETAMINOPHEN 5; 325 MG/1; MG/1
2 TABLET ORAL EVERY 4 HOURS PRN
Status: DISCONTINUED | OUTPATIENT
Start: 2023-11-14 | End: 2023-11-15 | Stop reason: HOSPADM

## 2023-11-14 RX ORDER — LABETALOL HYDROCHLORIDE 5 MG/ML
5 INJECTION, SOLUTION INTRAVENOUS
Status: DISCONTINUED | OUTPATIENT
Start: 2023-11-14 | End: 2023-11-14 | Stop reason: HOSPADM

## 2023-11-14 RX ORDER — ONDANSETRON 2 MG/ML
4 INJECTION INTRAMUSCULAR; INTRAVENOUS ONCE AS NEEDED
Status: DISCONTINUED | OUTPATIENT
Start: 2023-11-14 | End: 2023-11-14 | Stop reason: HOSPADM

## 2023-11-14 RX ORDER — FENTANYL CITRATE 50 UG/ML
50 INJECTION, SOLUTION INTRAMUSCULAR; INTRAVENOUS
Status: DISCONTINUED | OUTPATIENT
Start: 2023-11-14 | End: 2023-11-14 | Stop reason: HOSPADM

## 2023-11-14 RX ORDER — FERROUS SULFATE 325(65) MG
325 TABLET ORAL
Status: DISCONTINUED | OUTPATIENT
Start: 2023-11-15 | End: 2023-11-15 | Stop reason: HOSPADM

## 2023-11-14 RX ORDER — DIAZEPAM 5 MG/1
5 TABLET ORAL 2 TIMES DAILY PRN
Status: DISCONTINUED | OUTPATIENT
Start: 2023-11-14 | End: 2023-11-15 | Stop reason: HOSPADM

## 2023-11-14 RX ORDER — ACETAMINOPHEN 500 MG
1000 TABLET ORAL ONCE
Status: COMPLETED | OUTPATIENT
Start: 2023-11-14 | End: 2023-11-14

## 2023-11-14 RX ORDER — DOCUSATE SODIUM 100 MG/1
100 CAPSULE, LIQUID FILLED ORAL 2 TIMES DAILY PRN
Status: DISCONTINUED | OUTPATIENT
Start: 2023-11-14 | End: 2023-11-15 | Stop reason: HOSPADM

## 2023-11-14 RX ORDER — SODIUM CHLORIDE, SODIUM LACTATE, POTASSIUM CHLORIDE, CALCIUM CHLORIDE 600; 310; 30; 20 MG/100ML; MG/100ML; MG/100ML; MG/100ML
9 INJECTION, SOLUTION INTRAVENOUS CONTINUOUS
Status: DISCONTINUED | OUTPATIENT
Start: 2023-11-14 | End: 2023-11-15 | Stop reason: HOSPADM

## 2023-11-14 RX ORDER — ONDANSETRON 2 MG/ML
4 INJECTION INTRAMUSCULAR; INTRAVENOUS EVERY 6 HOURS PRN
Status: DISCONTINUED | OUTPATIENT
Start: 2023-11-14 | End: 2023-11-15 | Stop reason: HOSPADM

## 2023-11-14 RX ORDER — LIDOCAINE HYDROCHLORIDE 20 MG/ML
INJECTION, SOLUTION EPIDURAL; INFILTRATION; INTRACAUDAL; PERINEURAL AS NEEDED
Status: DISCONTINUED | OUTPATIENT
Start: 2023-11-14 | End: 2023-11-14 | Stop reason: SURG

## 2023-11-14 RX ORDER — PROMETHAZINE HYDROCHLORIDE 25 MG/1
25 SUPPOSITORY RECTAL ONCE AS NEEDED
Status: DISCONTINUED | OUTPATIENT
Start: 2023-11-14 | End: 2023-11-14 | Stop reason: HOSPADM

## 2023-11-14 RX ORDER — TAMSULOSIN HYDROCHLORIDE 0.4 MG/1
0.4 CAPSULE ORAL NIGHTLY
Status: DISCONTINUED | OUTPATIENT
Start: 2023-11-14 | End: 2023-11-15 | Stop reason: HOSPADM

## 2023-11-14 RX ORDER — MAGNESIUM HYDROXIDE 1200 MG/15ML
LIQUID ORAL AS NEEDED
Status: DISCONTINUED | OUTPATIENT
Start: 2023-11-14 | End: 2023-11-14 | Stop reason: HOSPADM

## 2023-11-14 RX ORDER — SODIUM CHLORIDE 450 MG/100ML
100 INJECTION, SOLUTION INTRAVENOUS CONTINUOUS
Status: DISCONTINUED | OUTPATIENT
Start: 2023-11-14 | End: 2023-11-15 | Stop reason: HOSPADM

## 2023-11-14 RX ORDER — HYDRALAZINE HYDROCHLORIDE 20 MG/ML
5 INJECTION INTRAMUSCULAR; INTRAVENOUS
Status: DISCONTINUED | OUTPATIENT
Start: 2023-11-14 | End: 2023-11-14 | Stop reason: HOSPADM

## 2023-11-14 RX ORDER — PANTOPRAZOLE SODIUM 40 MG/1
40 TABLET, DELAYED RELEASE ORAL
Status: DISCONTINUED | OUTPATIENT
Start: 2023-11-15 | End: 2023-11-15 | Stop reason: HOSPADM

## 2023-11-14 RX ORDER — HYDROCODONE BITARTRATE AND ACETAMINOPHEN 7.5; 325 MG/1; MG/1
1 TABLET ORAL EVERY 4 HOURS PRN
Status: DISCONTINUED | OUTPATIENT
Start: 2023-11-14 | End: 2023-11-14 | Stop reason: HOSPADM

## 2023-11-14 RX ORDER — ASPIRIN 325 MG
325 TABLET, DELAYED RELEASE (ENTERIC COATED) ORAL 2 TIMES DAILY WITH MEALS
Status: DISCONTINUED | OUTPATIENT
Start: 2023-11-15 | End: 2023-11-15 | Stop reason: HOSPADM

## 2023-11-14 RX ORDER — CELECOXIB 200 MG/1
200 CAPSULE ORAL ONCE
Status: COMPLETED | OUTPATIENT
Start: 2023-11-14 | End: 2023-11-14

## 2023-11-14 RX ORDER — FENTANYL CITRATE 50 UG/ML
100 INJECTION, SOLUTION INTRAMUSCULAR; INTRAVENOUS
Status: DISCONTINUED | OUTPATIENT
Start: 2023-11-14 | End: 2023-11-14 | Stop reason: HOSPADM

## 2023-11-14 RX ORDER — PHENYLEPHRINE HCL IN 0.9% NACL 1 MG/10 ML
SYRINGE (ML) INTRAVENOUS AS NEEDED
Status: DISCONTINUED | OUTPATIENT
Start: 2023-11-14 | End: 2023-11-14 | Stop reason: SURG

## 2023-11-14 RX ORDER — IPRATROPIUM BROMIDE AND ALBUTEROL SULFATE 2.5; .5 MG/3ML; MG/3ML
3 SOLUTION RESPIRATORY (INHALATION) ONCE AS NEEDED
Status: DISCONTINUED | OUTPATIENT
Start: 2023-11-14 | End: 2023-11-14 | Stop reason: HOSPADM

## 2023-11-14 RX ORDER — NALOXONE HCL 0.4 MG/ML
0.4 VIAL (ML) INJECTION
Status: DISCONTINUED | OUTPATIENT
Start: 2023-11-14 | End: 2023-11-15 | Stop reason: HOSPADM

## 2023-11-14 RX ORDER — MAGNESIUM SULFATE HEPTAHYDRATE 500 MG/ML
INJECTION, SOLUTION INTRAMUSCULAR; INTRAVENOUS AS NEEDED
Status: DISCONTINUED | OUTPATIENT
Start: 2023-11-14 | End: 2023-11-14 | Stop reason: SURG

## 2023-11-14 RX ORDER — PROPOFOL 10 MG/ML
INJECTION, EMULSION INTRAVENOUS AS NEEDED
Status: DISCONTINUED | OUTPATIENT
Start: 2023-11-14 | End: 2023-11-14 | Stop reason: SURG

## 2023-11-14 RX ORDER — DIPHENHYDRAMINE HCL 25 MG
25 CAPSULE ORAL EVERY 6 HOURS PRN
Status: DISCONTINUED | OUTPATIENT
Start: 2023-11-14 | End: 2023-11-15 | Stop reason: HOSPADM

## 2023-11-14 RX ORDER — CEFAZOLIN SODIUM 2 G/100ML
2000 INJECTION, SOLUTION INTRAVENOUS ONCE
Status: COMPLETED | OUTPATIENT
Start: 2023-11-14 | End: 2023-11-14

## 2023-11-14 RX ORDER — FENTANYL CITRATE 50 UG/ML
25 INJECTION, SOLUTION INTRAMUSCULAR; INTRAVENOUS
Status: DISCONTINUED | OUTPATIENT
Start: 2023-11-14 | End: 2023-11-14 | Stop reason: HOSPADM

## 2023-11-14 RX ORDER — OXYCODONE HYDROCHLORIDE AND ACETAMINOPHEN 5; 325 MG/1; MG/1
1 TABLET ORAL EVERY 4 HOURS PRN
Status: DISCONTINUED | OUTPATIENT
Start: 2023-11-14 | End: 2023-11-15 | Stop reason: HOSPADM

## 2023-11-14 RX ORDER — HYDROMORPHONE HYDROCHLORIDE 1 MG/ML
0.25 INJECTION, SOLUTION INTRAMUSCULAR; INTRAVENOUS; SUBCUTANEOUS
Status: DISCONTINUED | OUTPATIENT
Start: 2023-11-14 | End: 2023-11-14 | Stop reason: HOSPADM

## 2023-11-14 RX ORDER — ASPIRIN 325 MG
325 TABLET, DELAYED RELEASE (ENTERIC COATED) ORAL 2 TIMES DAILY WITH MEALS
Qty: 60 TABLET | Refills: 0 | Status: SHIPPED | OUTPATIENT
Start: 2023-11-14

## 2023-11-14 RX ORDER — PROMETHAZINE HYDROCHLORIDE 25 MG/1
25 TABLET ORAL ONCE AS NEEDED
Status: DISCONTINUED | OUTPATIENT
Start: 2023-11-14 | End: 2023-11-14 | Stop reason: HOSPADM

## 2023-11-14 RX ORDER — CEFAZOLIN SODIUM 500 MG/2.2ML
INJECTION, POWDER, FOR SOLUTION INTRAMUSCULAR; INTRAVENOUS AS NEEDED
Status: DISCONTINUED | OUTPATIENT
Start: 2023-11-14 | End: 2023-11-14 | Stop reason: SURG

## 2023-11-14 RX ORDER — SODIUM CHLORIDE 0.9 % (FLUSH) 0.9 %
10 SYRINGE (ML) INJECTION EVERY 12 HOURS SCHEDULED
Status: DISCONTINUED | OUTPATIENT
Start: 2023-11-14 | End: 2023-11-15 | Stop reason: HOSPADM

## 2023-11-14 RX ORDER — SODIUM CHLORIDE 0.9 % (FLUSH) 0.9 %
1-10 SYRINGE (ML) INJECTION AS NEEDED
Status: DISCONTINUED | OUTPATIENT
Start: 2023-11-14 | End: 2023-11-15 | Stop reason: HOSPADM

## 2023-11-14 RX ORDER — ACETAMINOPHEN 325 MG/1
325 TABLET ORAL EVERY 4 HOURS PRN
Status: DISCONTINUED | OUTPATIENT
Start: 2023-11-14 | End: 2023-11-15 | Stop reason: HOSPADM

## 2023-11-14 RX ORDER — ONDANSETRON 4 MG/1
4 TABLET, FILM COATED ORAL EVERY 6 HOURS PRN
Status: DISCONTINUED | OUTPATIENT
Start: 2023-11-14 | End: 2023-11-15 | Stop reason: HOSPADM

## 2023-11-14 RX ORDER — SODIUM CHLORIDE 0.9 % (FLUSH) 0.9 %
3 SYRINGE (ML) INJECTION EVERY 12 HOURS SCHEDULED
Status: DISCONTINUED | OUTPATIENT
Start: 2023-11-14 | End: 2023-11-14 | Stop reason: HOSPADM

## 2023-11-14 RX ORDER — ROCURONIUM BROMIDE 10 MG/ML
INJECTION, SOLUTION INTRAVENOUS AS NEEDED
Status: DISCONTINUED | OUTPATIENT
Start: 2023-11-14 | End: 2023-11-14 | Stop reason: SURG

## 2023-11-14 RX ADMIN — HYDROMORPHONE HYDROCHLORIDE 0.25 MG: 1 INJECTION, SOLUTION INTRAMUSCULAR; INTRAVENOUS; SUBCUTANEOUS at 16:49

## 2023-11-14 RX ADMIN — DEXMEDETOMIDINE HCL 12 MCG: 100 INJECTION INTRAVENOUS at 15:36

## 2023-11-14 RX ADMIN — Medication 10 ML: at 21:10

## 2023-11-14 RX ADMIN — SODIUM CHLORIDE, POTASSIUM CHLORIDE, SODIUM LACTATE AND CALCIUM CHLORIDE 9 ML/HR: 600; 310; 30; 20 INJECTION, SOLUTION INTRAVENOUS at 12:34

## 2023-11-14 RX ADMIN — MAGNESIUM SULFATE HEPTAHYDRATE 1 G: 500 INJECTION, SOLUTION INTRAMUSCULAR; INTRAVENOUS at 16:09

## 2023-11-14 RX ADMIN — ACETAMINOPHEN 1000 MG: 500 TABLET ORAL at 12:35

## 2023-11-14 RX ADMIN — PROPOFOL 120 MG: 10 INJECTION, EMULSION INTRAVENOUS at 15:41

## 2023-11-14 RX ADMIN — MAGNESIUM SULFATE HEPTAHYDRATE 1 G: 500 INJECTION, SOLUTION INTRAMUSCULAR; INTRAVENOUS at 15:41

## 2023-11-14 RX ADMIN — SODIUM CHLORIDE 100 ML/HR: 4.5 INJECTION, SOLUTION INTRAVENOUS at 21:30

## 2023-11-14 RX ADMIN — OXYCODONE HYDROCHLORIDE AND ACETAMINOPHEN 1 TABLET: 5; 325 TABLET ORAL at 21:05

## 2023-11-14 RX ADMIN — DEXMEDETOMIDINE HCL 8 MCG: 100 INJECTION INTRAVENOUS at 16:12

## 2023-11-14 RX ADMIN — TAMSULOSIN HYDROCHLORIDE 0.4 MG: 0.4 CAPSULE ORAL at 21:05

## 2023-11-14 RX ADMIN — SODIUM CHLORIDE, POTASSIUM CHLORIDE, SODIUM LACTATE AND CALCIUM CHLORIDE: 600; 310; 30; 20 INJECTION, SOLUTION INTRAVENOUS at 17:26

## 2023-11-14 RX ADMIN — FENTANYL CITRATE 25 MCG: 50 INJECTION, SOLUTION INTRAMUSCULAR; INTRAVENOUS at 18:15

## 2023-11-14 RX ADMIN — CELECOXIB 200 MG: 200 CAPSULE ORAL at 12:35

## 2023-11-14 RX ADMIN — SUGAMMADEX 200 MG: 100 INJECTION, SOLUTION INTRAVENOUS at 17:20

## 2023-11-14 RX ADMIN — DEXMEDETOMIDINE HCL 4 MCG: 100 INJECTION INTRAVENOUS at 16:46

## 2023-11-14 RX ADMIN — HYDROMORPHONE HYDROCHLORIDE 0.25 MG: 1 INJECTION, SOLUTION INTRAMUSCULAR; INTRAVENOUS; SUBCUTANEOUS at 17:21

## 2023-11-14 RX ADMIN — DEXAMETHASONE SODIUM PHOSPHATE 8 MG: 4 INJECTION, SOLUTION INTRAMUSCULAR; INTRAVENOUS at 15:43

## 2023-11-14 RX ADMIN — FENTANYL CITRATE 25 MCG: 50 INJECTION, SOLUTION INTRAMUSCULAR; INTRAVENOUS at 18:08

## 2023-11-14 RX ADMIN — ROCURONIUM BROMIDE 50 MG: 10 INJECTION, SOLUTION INTRAVENOUS at 15:50

## 2023-11-14 RX ADMIN — Medication 120 MG: at 15:42

## 2023-11-14 RX ADMIN — CEFAZOLIN SODIUM 2000 MG: 2 INJECTION, SOLUTION INTRAVENOUS at 15:26

## 2023-11-14 RX ADMIN — FENTANYL CITRATE 50 MCG: 50 INJECTION, SOLUTION INTRAMUSCULAR; INTRAVENOUS at 13:56

## 2023-11-14 RX ADMIN — HYDROCODONE BITARTRATE AND ACETAMINOPHEN 1 TABLET: 7.5; 325 TABLET ORAL at 17:48

## 2023-11-14 RX ADMIN — LIDOCAINE HYDROCHLORIDE 60 MG: 20 INJECTION, SOLUTION EPIDURAL; INFILTRATION; INTRACAUDAL; PERINEURAL at 15:41

## 2023-11-14 RX ADMIN — ONDANSETRON 4 MG: 2 INJECTION INTRAMUSCULAR; INTRAVENOUS at 17:22

## 2023-11-14 RX ADMIN — Medication 100 MCG: at 16:04

## 2023-11-14 RX ADMIN — CEFAZOLIN 2 G: 1 INJECTION, POWDER, FOR SOLUTION INTRAMUSCULAR; INTRAVENOUS at 17:05

## 2023-11-14 NOTE — DISCHARGE SUMMARY
Orthopaedic Surgery Discharge Summary    Patient Name:  Feliciano Noguera  YOB: 1950  Age: 73 y.o.  Medical Records Number:  [id number]    Date of Admission:  11/14/2023  Date of Discharge:  11/15/2023    Primary Discharge Diagnosis:  Primary osteoarthritis of left knee [M17.12]    Secondary Discharge Diagnosis:    Problems Addressed this Visit          Musculoskeletal and Injuries    * (Principal) Primary osteoarthritis of left knee - Primary    Relevant Medications    oxyCODONE-acetaminophen (PERCOCET) 5-325 MG per tablet     Diagnoses         Codes Comments    Primary osteoarthritis of left knee    -  Primary ICD-10-CM: M17.12  ICD-9-CM: 715.16             Procedures Performed:  Left Total Knee Arthroplasty      Hospital Course:    Feliciano Noguera is a 73 y.o.  male who underwent successful left tka on 11/14/2023.  Feliciano Noguera was started on Aspirin 325 mg po twice daily post-operatively for DVT prophylaxis.  On post-op day 1 the patients dressing was changed and their incision was clean, with no signs of infection and their calf was soft, with no signs of DVT.  The patient progressed well with physical therapy and the patients hemoglobin remained stable. On post-operative day 1 the patient was felt ready for discharge.     Vitals:  Vitals:    11/14/23 1405 11/14/23 1406 11/14/23 1733 11/14/23 1735   BP: 138/92  137/81 136/80   BP Location:   Left arm    Patient Position:   Lying    Pulse: 65 63 77 84   Resp:   16 16   Temp:   97.8 °F (36.6 °C)    TempSrc:   Oral    SpO2: 98% 98% 97% 98%       Discharge Medications:      Discharge Medications        New Medications        Instructions Start Date   docusate sodium 250 MG capsule  Commonly known as: COLACE   250 mg, Oral, 2 Times Daily PRN             Changes to Medications        Instructions Start Date   aspirin 325 MG tablet  Commonly known as: Aung Aspirin  What changed: when to take this   325 mg, Oral, 2 Times Daily With Meals       oxyCODONE-acetaminophen 5-325 MG per tablet  Commonly known as: PERCOCET  What changed: See the new instructions.   1-2 tablets, Oral, Every 4 Hours PRN             Continue These Medications        Instructions Start Date   omeprazole 40 MG capsule  Commonly known as: priLOSEC   No dose, route, or frequency recorded.      tamsulosin 0.4 MG capsule 24 hr capsule  Commonly known as: FLOMAX   1 capsule, Oral, Nightly             Stop These Medications      meloxicam 15 MG tablet  Commonly known as: MOBIC              Pain Medications:  Percocet 5/325 mg 1-2 po q 4-6 hours prn pain    DVT Prophylaxis:  Enteric Coated Aspirin 325 mg po twice daily for 2 weeks, then one daily for 4 weeks      Total Knee Joint Replacement Discharge Instructions:    I. ACTIVITIES:  1. Exercises:  Complete exercise program as taught by the hospital physical therapist 2 times per day  Exercise program will be advanced by the physical therapist  During the day be up ambulating every 2 hours (while awake) for short distances  Complete the ankle pump exercises at least 10 times per hour (while awake)  Elevate legs most of the day the first week post operatively and thereafter elevate legs when in bed and for at least 30 minutes during the day. Caution must be taken to avoid pillow placement under the bend of the knee as this can led to flexion contractures of the knee.  Use cold packs 20-30 minutes approximately 5 times per day. This should be done before and after completing your exercises and at any time you are experiencing pain/ stiffness in your operative extremity.      2. Activities of Daily Living:  No tub baths, hot tubs, or swimming pools for 4 weeks  May shower and let water run over the incision on post-operative day #5 if no drainage. Do not scrub or rub the incision. Simply let the water run over the incision and pat dry.    II. Restrictions  Do not cross legs or kneel  Your surgeon will discuss with you when you will be able to  drive again.  Weight bearing is as tolerated  First week stay inside on even terrain. May go up and down stairs one stair at a time utilizing the hand rail  After one week, you may venture outside.    III. Precautions:  Everyone that comes near you should wash their hands  No elective dental, genital-urinary, or colon procedures or surgical procedures for 12 weeks after surgery unless absolutely necessary.   If dental work or surgical procedure is deemed absolutely necessary, you will need to contact your surgeon as you will need to take antibiotics 1 hour prior to any dental work (including teeth cleanings).  Please discuss with your surgeon prophylactic antibiotics as the length of time this intervention will be necessary for you varies with each patient’s health history and situation.  Avoid sick people. If you must be around someone who is ill, they should wear a mask.  Avoid visits to the Emergency Room or Urgent Care unless you are having a life threatening event.   If ordered stockings are to be placed on in the morning and removed at night. Monitor the stockings to ensure that any swelling is not causing the stockings to become too tight. In this case, remove stockings immediately.    IV. INCISION CARE:  Wash your hands prior to dressing changes  Change the dressing as needed to keep incision clean and dry. Utilize dry gauze and paper tape. Avoid touching the side of the gauze that goes against the incision with your hands.  No creams or ointments to the incision  May remove dressing once the incision is free of drainage  Do not touch or pick at the incision  Check incision every day and notify surgeon immediately if any of the following signs or symptoms are noted:  Increase in redness  Increase in swelling around the incision and of the entire extremity  Increase in pain  Drainage oozing from the incision  Pulling apart of the edges of the incision  Increase in overall body temperature (greater than 100.5  degrees)  Your surgeon will instruct you regarding suture or staple removal    V. Medications:   1. Anticoagulants: You will be discharged on an anticoagulant. This is a prophylactic medication that helps prevent blood clots during your post-operative period. The type and length of dosage varies based on your individual needs, procedure performed, and surgeon’s preference.  While taking the anticoagulant, you should avoid taking any additional aspirin, ibuprofen (Advil or Motrin), Aleve (Naprosyn) or other non-steroidal anti-inflammatory medications.   Notify surgeon immediately if any lolly bleeding is noted in the urine, stool, emesis, or from the nose or the incision. Blood in the stool will often appear as black rather than red. Blood in urine may appear as pink. Blood in emesis may appear as brown/black like coffee grounds.  You will need to apply pressure for longer periods of time to any cuts or abrasions to stop bleeding  Avoid alcohol while taking anticoagulants    2. Stool Softeners: You will be at greater risk of constipation after surgery due to being less mobile and the pain medications.   Take stool softeners as instructed by your surgeon while on pain medications. Over the counter Colace 100 mg 1-2 capsules twice daily.   If stools become too loose or too frequent, please decreases the dosage or stop the stool softener.  If constipation occurs despite use of stool softeners, you are to continue the stool softeners and add a laxative (Milk of Magnesia 1 ounce daily as needed)  Drink plenty of fluids, and eat fruits and vegetables during your recovery time    3. Pain Medications utilized after surgery are narcotics and the law requires that the following information be given to all patients that are prescribed narcotics:  CLASSIFICATION: Pain medications are called Opioids and are narcotics  LEGALITIES: It is illegal to share narcotics with others and to drive within 24 hours of taking  narcotics  POTENTIAL SIDE EFFECTS: Potential side effects of opioids include: nausea, vomiting, itching, dizziness, drowsiness, dry mouth, constipation, and difficulty urinating.  POTENTIAL ADVERSE EFFECTS:   Opioid tolerance can develop with use of pain medications and this simply means that it requires more and more of the medication to control pain; however, this is seen more in patients that use opioids for longer periods of time.  Opioid dependence can develop with use of Opioids and this simply means that to stop the medication can cause withdrawal symptoms; however, this is seen with patients that use Opioids for longer periods of time.  Opioid addiction can develop with use of Opioids and the incidence of this is very unlikely in patients who take the medications as ordered and stop the medications as instructed.  Opioid overdose can be dangerous, but is unlikely when the medication is taken as ordered and stopped when ordered. It is important not to mix opioids with alcohol or with and type of sedative such as Benadryl as this can lead to over sedation and respiratory difficulty.  DOSAGE:   Pain medications will need to be taken consistently for the first week to decrease pain and promote adequate pain relief and participation in physical therapy.  After the initial surgical pain begins to resolve, you may begin to decrease the pain medication. By the end of 6-8 weeks, you should be off of pain medications.  Refills will not be given by the office during evening hours, on weekends, or after 6-8 weeks post-op.  To seek refills on pain medications during the initial 6 week post-operative period, you must call the office 48 hours in advance to request the refill. The office will then notify you when to  the prescription. DO NOT wait until you are out of the medication to request a refill.    V. FOLLOW-UP VISITS:  You will need to follow up in the office with your surgeon in 3 weeks. Please call this  number [phone] to schedule this appointment.  If you have any concerns or suspected complications prior to your follow up visit, please call your surgeons office. Do not wait until your appointment time if you suspect complications. These will need to be addressed in the office promptly.    Bola Orlando PA-C  [facility]  [address] [place] [id number]  [phone]    11/14/2023    CC:Daniel Mahan MD:Bola Ramires MD

## 2023-11-14 NOTE — H&P
Orthopaedic Surgery History and Physical    Patient Name:  Feliciano Noguera  YOB: 1950   Age: 73 y.o.  Medical Records Number:  [id number]    Date of Admission:  11/14/2023 11:19 AM    Chief Complaint:  No admission diagnoses are documented for this encounter.    Feliciano Noguera is a 73 y.o. male who presents c/o severe left knee pain.  The pain has been on and off for many years, worsening to the point where the pain is becoming disabling.  The pain is a constant dull ache with occasional sharp, stabbing pain.  The patient has failed conservative treatment and would like to proceed with left total knee arthroplasty.    /77 (BP Location: Right arm, Patient Position: Sitting)   Pulse 70   Temp 97.7 °F (36.5 °C) (Oral)   Resp 16   SpO2 97%     Past Medical History:    Past Medical History:   Diagnosis Date    Arthritis     Enlarged prostate     GERD (gastroesophageal reflux disease)     [place] (hard of hearing)     DEAF IN RIGHT EAR    Knee pain, left        Past Surgical History:   Past Surgical History:   Procedure Laterality Date    CHOLECYSTECTOMY WITH INTRAOPERATIVE CHOLANGIOGRAM N/A 8/1/2017    Procedure: CHOLECYSTECTOMY LAPAROSCOPIC INTRAOPERATIVE CHOLANGIOGRAM;  Surgeon: Bola Patel MD;  Location: [facility] OR [facility];  Service:     COLONOSCOPY N/A 09/24/2009    Dr. Jamison Johnson    COLONOSCOPY N/A 11/14/2018    Procedure: COLONOSCOPY into cecum with biopsy;  Surgeon: Bola Patel MD;  Location: [facility] ENDOSCOPY;  Service: General    ENDOSCOPY N/A 12/15/2008    Dr. Jamison Johnson    ENDOSCOPY N/A 11/14/2018    Procedure: ESOPHAGOGASTRODUODENOSCOPY with biopsy;  Surgeon: Bola Patel MD;  Location: [facility] ENDOSCOPY;  Service: General    KNEE ARTHROSCOPY Left     PROSTATE BIOPSY      ROTATOR CUFF REPAIR Right 09/29/2009    Dr. Arik Harris    TOTAL KNEE ARTHROPLASTY Right 5/28/2019    Procedure: RIGHT TOTAL KNEE ARTHROPLASTY;  Surgeon: Bola Ramires MD;   Location: [facility] OR;  Service: Orthopedics       Social History:    Social History     Socioeconomic History    Marital status:    Tobacco Use    Smoking status: Every Day     Packs/day: 0     Types: Cigars, Cigarettes    Smokeless tobacco: Never    Tobacco comments:     2 CIGARS PER DAY, SOMETIMES LESS   Vaping Use    Vaping Use: Never used   Substance and Sexual Activity    Alcohol use: No    Drug use: No    Sexual activity: Defer       Family History:    Family History   Problem Relation Age of Onset    Diabetes Mother     Tuberculosis Father     Breast cancer Sister     Bone cancer Sister     Lung cancer Brother     Diabetes Brother     Heart disease Brother     Malig Hyperthermia Neg Hx        Current Medications:  Scheduled Meds:ceFAZolin, 2,000 mg, Intravenous, Once  ropivacaine 0.5 % 50 mL, Morphine 5 mg, ketorolac 30 mg, EPINEPHrine 1 mg/mL 0.3 mg in sodium chloride 0.9 % 50 mL solution, , Injection, Once  sodium chloride, 3 mL, Intravenous, Q12H      Continuous Infusions:lactated ringers, 9 mL/hr, Last Rate: 9 mL/hr (11/14/23 1234)      PRN Meds:.  fentanyl    fentaNYL citrate (PF)    sodium chloride    Current Facility-Administered Medications:     ceFAZolin in dextrose (ANCEF) IVPB solution 2,000 mg, 2,000 mg, Intravenous, Once, Bola Ramires MD    fentaNYL citrate (PF) (SUBLIMAZE) injection 100 mcg, 100 mcg, Intravenous, Q15 Min PRN, Brian Beck MD    fentaNYL citrate (PF) (SUBLIMAZE) injection 50 mcg, 50 mcg, Intravenous, Q5 Min PRN, Brian Beck MD    lactated ringers infusion, 9 mL/hr, Intravenous, Continuous, Brian Beck MD, Last Rate: 9 mL/hr at 11/14/23 1234, 9 mL/hr at 11/14/23 1234    ropivacaine 0.5 % 50 mL, Morphine 5 mg, ketorolac 30 mg, EPINEPHrine 1 mg/mL 0.3 mg in sodium chloride 0.9 % 50 mL solution, , Injection, Once, Bola Ramires MD    sodium chloride 0.9 % flush 3 mL, 3 mL, Intravenous, Q12H, Brian Beck MD    sodium chloride 0.9 % flush 3-10 mL,  3-10 mL, Intravenous, PRN, Brian Beck MD    Allergies:    Allergies   Allergen Reactions    Codeine GI Intolerance     Abdominal Pain       Review of Systems:   HEENT: Patient denies any headaches, vision changes, change in hearing, or tinnitus, Patient denies any rhinorrhea,epistaxis, sinus pain, mouth or dental problems, sore throat or hoarseness, or dysphagia  Pulmonary: Patient denies any cough, congestion, SOA, or wheezing  Cardiovascular: Patient denies any chest pain, dyspnea, palpitations, weakness, intolerance of exercise, varicosities, swelling of extremities, known murmur  Gastrointestinal:  Patient denies nausea, vomiting, diarrhea, constipation, loss  of appetite, change in appetite, dysphagia, gas, heartburn, melena, change in bowel habits, use of laxatives or other drugs to alter the function of the gastrointestinal tract.  Genital/Urinary: Patient denies dysuria, change in color of urine, change in frequency of urination, pain with urgency, incontinence, retention, or nocturia.  Musculoskeletal: Patient denies increased warmth; redness; or swelling of joints; limitation of function; deformity; crepitation: pain in a joint or an extremity, the neck, or the back, especially with movement.  Neurological: Patient denies dizziness, tremor, ataxia, difficulty in speaking, change in speech, paresthesia, loss of sensation, seizures, syncope, changes in memory.  Endocrine system: Patient denies tremors, palpitations, intolerance of heat or cold, polyuria, polydipsia, polyphagia, diaphoresis, exophthalmos, or goiter.  Psychological: Patient denies thoughts/plans or harming self or other; depression,  insomnia, night terrors, mariana, memory loss, disorientation.  Skin: Patient denies any bruising, rashes, discoloration, pruritus, wounds, ulcers, decubiti, changes in the hair or nails  Hematopoietic: Patient denies history of spontaneous or excessive bleeding, epistaxis, hematuria, melena, fatigue, enlarged  or tender lymph nodes, pallor, history of anemia.        Physical Exam:   Awake, A&O x3, affect normal, no acute distress  Ambulating with a limp due to knee pain  Knee ROM is limited due to pain (5-115)  Strength is 4/5 in the quad, hamstring and calf  Cap refill is normal, Sensation intact    Card:  RR, HD Stable  Pulm:  Regular breathing, no S.O.A  Abd:  Soft, NT, ND    Lab Results (last 24 hours)       ** No results found for the last 24 hours. **            XR Chest PA & Lateral, XR Knee 1 or 2 View Left    Result Date: 11/8/2023  Narrative: XR KNEE 1 OR 2 VW LEFT-, XR CHEST PA AND LATERAL-  HISTORY: 73-year-old male. Preoperative evaluation.  FINDINGS:  CHEST: Compared with 08/09/2021 and 05/24/2019 there is no convincing evidence for acute pneumonia, effusions, or CHF.  LEFT KNEE: There are advanced osteoarthritic changes, most prominent at the lateral tibiofemoral compartment. There may be a tiny joint effusion.  This report was finalized on 11/8/2023 7:28 AM by Dr. Audra Garza M.D on Workstation: [id number]         Assessment:  End-stage Primary Left Knee Osteoarthritis    Plan:  Patient's pain is becoming disabling, despite extensive conservative treatment.  Radiographs reveal end-stage degenerative changes.  The risks of surgery, including, but not limited to, heart attack, stroke, dying, DVT, nerve injury, vascular injury, arthrofibrosis and infection were discussed.  The alternatives and benefits were also discussed.  All questions answered and the patient wishes to proceed with left total knee arthroplasty.    Bola Orlando PALyndseyC  [facility]  [address] [place], [place] [id number] [phone]    11/14/2023    CC: Daniel Mahan MD, Bola Ramires MD

## 2023-11-14 NOTE — ANESTHESIA PREPROCEDURE EVALUATION
Anesthesia Evaluation     Patient summary reviewed and Nursing notes reviewed   NPO Solid Status: > 8 hours             Airway   Mallampati: II  TM distance: >3 FB  Neck ROM: full  no difficulty expected  Dental - normal exam     Pulmonary - normal exam   (+) a smoker Current,  Cardiovascular - negative cardio ROS and normal exam        Neuro/Psych- negative ROS  GI/Hepatic/Renal/Endo    (+) GERD    Musculoskeletal (-) negative ROS    Abdominal  - normal exam   Substance History - negative use     OB/GYN negative ob/gyn ROS         Other                          Anesthesia Plan    ASA 2     general     (Add canal popc)  intravenous induction     Anesthetic plan, risks, benefits, and alternatives have been provided, discussed and informed consent has been obtained with: patient.    Plan discussed with CRNA.

## 2023-11-14 NOTE — OP NOTE
Orthopaedic Surgery Operative Note    Patient Name:  Feliciano Noguera  YOB: 1950  Age: 73 y.o.  Medical Records Number:  [id number]    Date of Procedure:  11/14/2023    Pre-operative Diagnosis:  Primary Osteoarthritis Left Knee    Post-operative Diagnosis:  Primary Osteoarthritis Left Knee    Procedure Performed:  Left Total Knee Arthroplasty    Surgical Approach: Knee Mid-Vastus    Implants:  Biomet Vanguard TKA, 70 Femoral Component, 79 Tibial Tray with a 40 mm Modular Stem, 37 3-Peg Patella, 11 mm Standard Polyethylene Insert    Surgeon:  Bola Ramires M.D.    Assistant: Vannesa Moncada (who was present during the critical portions of the case, thereby decreasing operative time and patient morbidity)    Anesthetic Type:  General    Estimated Blood Loss:  250cc's    Specimens: * No orders in the log *    No Complications      Indications for Procedure:  Feliciano Noguera is a 73 y.o. male suffering from end stage degenerative changes in the left knee.  The patients pain is becoming disabling, despite extensive conservative care, including NSAIDS, therapy and injections. The risks, benefits and alternatives were discussed and the patient wishes to proceed with left total knee arthroplasty.      Procedure Performed:    After informed consent was obtained, the correct patient identified, the correct operative side marked by the operative surgeon, and pre-operative IV Kefzol  given (prior to tourniquet inflation), the patient was taken to the operating room and placed supine on the operating table.  After general anesthesia was induced, a surgical time out was performed and 1 gm of Tranxemic Acid given, a well-padded tourniquet was placed and the patient's left lower extremity was prepped with chloraprep and draped in a sterile fashion.    A midline incision was made overlying the left knee and we sharply dissected down to expose the parapatellar retinaculum.  A muscle sparing, mid-vastus,  parapatellar arthrotomy was performed and we elevated the soft tissue both medially and laterally.  The menisci and ACL were excised.  We everted the patella and measured this to be 25 mm and we removed 10 mm of bone down to 15 mm.  We measured the patella to be 37 and drilled our three drill holes.  We protected the patella with the patella protector and turned our attention to the femur and the tibia.    We drilled drill holes into the femoral and the tibial intramedullary canals and proceeded to irrigate the canals to remove the fatty marrow.  We used the intramedullary hoang and the 5 degree valgus cut block to make our distal femoral cut.  Once we had a smooth surface, we measured the femur to be 70.  We assured we had rotational alignment using the epicondylar axis, then we used the four-in-one cut block to make our anterior, posterior, anterior and posterior chamfer cuts.  We placed our femoral trial, had excellent fit, extended the knee and marked Stafford's line on the tibia.      We then turned our attention to the tibia, where we irrigated the canal again.  We used the intramedullary hoang and the 3 degree posterior sloped cut block to remove 2 mm of bone from the effected side of the tibia.  Prior to making our cut, we assured we had rotational alignment using the external guide and J Carlos's line.  Once we had a smooth surface, we measured the tibia to be 79.  We then removed soft tissue and bony debris from the posterior aspect of the knee.    We placed our trial implants and had excellent fit, range of motion, stability and ligament balance, throughout a complete arc of motion with a 12 polyethylene liner.  We removed our trial implants, punched the tibial keel, copiously irrigated the knee, then cemented our implants in place using Biomet cement.  Once the cement cured, we trialed again with the 10 and 12 AS, standard and posterior lipped polyethylene liners.  The 10's were too loose and the 12's were  "too tight, so we chose an 11 std.  The 11std gave us the best range of motion, stability and ligament balance, throughout a complete arc of motion.  We removed the trials, copiously irrigated the knee with dilute betadine solution, gave IV antibiotics and local anesthetic, then placed our permanent polyethylene liner.  We placed a 1/8\" hemovac drain, then closed the arthrotomy with #1 Vicryl pop-off sutures.  The subcutaneous tissue was closed with 2-0 vicryl pop-off sutures.  The skin was closed with staples.  We placed a sterile dressing of Xeroform, 4x4's, abdominal pads, cast padding and an Ace Wrap.  All sponge and needle counts were correct.  The patient was then awakened from general anesthesia and taken to the recovery room in stable condition.    The patient will be started on anticoagulation for DVT prophylaxis.  IV antibiotics will be discontinued within 24 hours of surgery.  Immediately prior to surgery, there were no acute Thromboembolic nor Cardiovascular risk factors.  An updated Medical Reconciliation form is on the chart.    Bola Orlando PA-C  [facility]  [address] [place] [address]  [phone]    11/14/2023      "

## 2023-11-14 NOTE — ANESTHESIA POSTPROCEDURE EVALUATION
Patient: Feliciano Noguera    Procedure Summary       Date: 11/14/23 Room / Location:  [facility] [address] [place] [address] [facility]    Anesthesia Start: 1534 Anesthesia Stop: 1739    Procedure: LEFT TOTAL KNEE ARTHROPLASTY (Left: Knee) Diagnosis:     Surgeons: Bola Ramires MD Provider: Reno Aguilar DO    Anesthesia Type: general ASA Status: 2            Anesthesia Type: general    Vitals  Vitals Value Taken Time   /84 11/14/23 1815   Temp 36.6 °C (97.8 °F) 11/14/23 1733   Pulse 79 11/14/23 1820   Resp 16 11/14/23 1816   SpO2 98 % 11/14/23 1820   Vitals shown include unfiled device data.        Post Anesthesia Care and Evaluation    Patient location during evaluation: bedside  Patient participation: complete - patient participated  Level of consciousness: awake  Pain management: adequate    Airway patency: patent  Anesthetic complications: No anesthetic complications    Cardiovascular status: acceptable  Respiratory status: acceptable  Hydration status: acceptable    Comments: /84   Pulse 76   Temp 36.6 °C (97.8 °F) (Oral)   Resp 16   SpO2 95%

## 2023-11-15 VITALS
OXYGEN SATURATION: 93 % | BODY MASS INDEX: 26.48 KG/M2 | SYSTOLIC BLOOD PRESSURE: 105 MMHG | HEIGHT: 70 IN | HEART RATE: 84 BPM | WEIGHT: 185 LBS | DIASTOLIC BLOOD PRESSURE: 70 MMHG | TEMPERATURE: 97.4 F | RESPIRATION RATE: 16 BRPM

## 2023-11-15 LAB
ANION GAP SERPL CALCULATED.3IONS-SCNC: 7.3 MMOL/L (ref 5–15)
BUN SERPL-MCNC: 10 MG/DL (ref 8–23)
BUN/CREAT SERPL: 11.4 (ref 7–25)
CALCIUM SPEC-SCNC: 8.5 MG/DL (ref 8.6–10.5)
CHLORIDE SERPL-SCNC: 104 MMOL/L (ref 98–107)
CO2 SERPL-SCNC: 23.7 MMOL/L (ref 22–29)
CREAT SERPL-MCNC: 0.88 MG/DL (ref 0.76–1.27)
DEPRECATED RDW RBC AUTO: 38.1 FL (ref 37–54)
EGFRCR SERPLBLD CKD-EPI 2021: 89.7 ML/MIN/1.73
ERYTHROCYTE [DISTWIDTH] IN BLOOD BY AUTOMATED COUNT: 12.5 % (ref 12.3–15.4)
GLUCOSE SERPL-MCNC: 128 MG/DL (ref 65–99)
HCT VFR BLD AUTO: 33.3 % (ref 37.5–51)
HGB BLD-MCNC: 11.2 G/DL (ref 13–17.7)
MCH RBC QN AUTO: 28.6 PG (ref 26.6–33)
MCHC RBC AUTO-ENTMCNC: 33.6 G/DL (ref 31.5–35.7)
MCV RBC AUTO: 84.9 FL (ref 79–97)
PLATELET # BLD AUTO: 196 10*3/MM3 (ref 140–450)
PMV BLD AUTO: 10.4 FL (ref 6–12)
POTASSIUM SERPL-SCNC: 4.1 MMOL/L (ref 3.5–5.2)
RBC # BLD AUTO: 3.92 10*6/MM3 (ref 4.14–5.8)
SODIUM SERPL-SCNC: 135 MMOL/L (ref 136–145)
WBC NRBC COR # BLD: 9.97 10*3/MM3 (ref 3.4–10.8)

## 2023-11-15 PROCEDURE — 25010000002 CLINDAMYCIN 900 MG/50ML SOLUTION: Performed by: ORTHOPAEDIC SURGERY

## 2023-11-15 PROCEDURE — A9270 NON-COVERED ITEM OR SERVICE: HCPCS | Performed by: ORTHOPAEDIC SURGERY

## 2023-11-15 PROCEDURE — 97161 PT EVAL LOW COMPLEX 20 MIN: CPT

## 2023-11-15 PROCEDURE — 80048 BASIC METABOLIC PNL TOTAL CA: CPT | Performed by: ORTHOPAEDIC SURGERY

## 2023-11-15 PROCEDURE — G0378 HOSPITAL OBSERVATION PER HR: HCPCS

## 2023-11-15 PROCEDURE — 63710000001 ASPIRIN 325 MG TABLET DELAYED-RELEASE: Performed by: ORTHOPAEDIC SURGERY

## 2023-11-15 PROCEDURE — 63710000001 PANTOPRAZOLE 40 MG TABLET DELAYED-RELEASE: Performed by: ORTHOPAEDIC SURGERY

## 2023-11-15 PROCEDURE — 97110 THERAPEUTIC EXERCISES: CPT

## 2023-11-15 PROCEDURE — 63710000001 FERROUS SULFATE 325 (65 FE) MG TABLET: Performed by: ORTHOPAEDIC SURGERY

## 2023-11-15 PROCEDURE — 85027 COMPLETE CBC AUTOMATED: CPT | Performed by: ORTHOPAEDIC SURGERY

## 2023-11-15 PROCEDURE — 63710000001 OXYCODONE-ACETAMINOPHEN 5-325 MG TABLET: Performed by: ORTHOPAEDIC SURGERY

## 2023-11-15 RX ADMIN — CLINDAMYCIN IN 5 PERCENT DEXTROSE 900 MG: 18 INJECTION, SOLUTION INTRAVENOUS at 00:28

## 2023-11-15 RX ADMIN — CLINDAMYCIN IN 5 PERCENT DEXTROSE 900 MG: 18 INJECTION, SOLUTION INTRAVENOUS at 09:24

## 2023-11-15 RX ADMIN — PANTOPRAZOLE SODIUM 40 MG: 40 TABLET, DELAYED RELEASE ORAL at 05:45

## 2023-11-15 RX ADMIN — Medication 10 ML: at 09:24

## 2023-11-15 RX ADMIN — FERROUS SULFATE TAB 325 MG (65 MG ELEMENTAL FE) 325 MG: 325 (65 FE) TAB at 09:24

## 2023-11-15 RX ADMIN — ASPIRIN 325 MG: 325 TABLET, COATED ORAL at 09:24

## 2023-11-15 RX ADMIN — OXYCODONE HYDROCHLORIDE AND ACETAMINOPHEN 2 TABLET: 5; 325 TABLET ORAL at 05:45

## 2023-11-15 NOTE — DISCHARGE PLACEMENT REQUEST
"Brian Noguera (73 y.o. Male)       Date of Birth   1950    Social Security Number       Address   [address] [place] [address] [address] [place] [place] [id number]    Home Phone   [phone]    MRN   [id number]       [facility]    Marital Status                               Admission Date   11/14/23    Admission Type   Elective    Admitting Provider   Bola Ramires MD    Attending Provider   Bola Ramires MD    Department, Room/Bed   [address], 94/1       Discharge Date       Discharge Disposition   Home or Self Care    Discharge Destination                                 Attending Provider: Bola Ramires MD    Allergies: Codeine    Isolation: None   Infection: None   Code Status: CPR    Ht: 177.8 cm (70\")   Wt: 83.9 kg (185 lb)    Admission Cmt: None   Principal Problem: Primary osteoarthritis of left knee [M17.12]                   Active Insurance as of 11/14/2023       Primary Coverage       Payor Plan Insurance Group Employer/Plan Group    MEDICARE MEDICARE A & B        Payor Plan Address Payor Plan Phone Number Payor Plan Fax Number Effective Dates    [address] [phone]  5/1/2013 - None Entered    [place] [address]         Subscriber Name Subscriber Birth Date Member ID       BRIAN NOGUERA 1950 [id number]                     Emergency Contacts        (Rel.) Home Phone Work Phone Mobile Phone    [place] (Spouse) [phone] -- [phone]              "

## 2023-11-15 NOTE — CASE MANAGEMENT/SOCIAL WORK
Discharge Planning Assessment  [facility]     Patient Name: Feliciano Noguera  MRN: [id number]  Today's Date: 11/15/2023    Admit Date: 11/14/2023    Plan: Home with family support & [facility].   Discharge Needs Assessment       Row Name 11/15/23 1128       Living Environment    People in Home spouse    Name(s) of People in Home Wife/Lili.    Current Living Arrangements home    Primary Care Provided by self    Provides Primary Care For no one    Family Caregiver if Needed spouse    Quality of Family Relationships helpful;involved;supportive    Able to Return to Prior Arrangements yes       Resource/Environmental Concerns    Transportation Concerns none       Transition Planning    Patient/Family Anticipates Transition to home with family;home with help/services    Patient/Family Anticipated Services at Transition     Transportation Anticipated family or friend will provide       Discharge Needs Assessment    Readmission Within the Last 30 Days no previous admission in last 30 days    Equipment Currently Used at Home none    Discharge Facility/Level of Care Needs home with home health    Provided Post Acute Provider List? N/A    N/A Provider List Comment Declined; requests [facility].    Patient's Choice of Community Agency(s) [facility].                   Discharge Plan       Row Name 11/15/23 1128       Plan    Plan Home with family support & [facility].    Patient/Family in Agreement with Plan yes    Plan Comments Spoke with the patient, verified current information and explained the role of the CCP. Patient said he lives with his wife/Lili and has family support. He has no history with DME/RH/HH. Patient plans to d/c home with family support and [facility]. Careplan received from [facility] which plans for [facility]. Discussed with the zeferino. Referral sent in [facility] [employer] [facility]. Spoke with Julia/Johnnie. Patient plans for his family to transport him home at d/c. No other needs identified. CCP  will follow.                  Continued Care and Services - Admitted Since 11/14/2023       Home Medical Care       Service Provider Request Status Selected Services Address Phone Fax Patient Preferred    [facility] Pending - Request Sent N/A [address] [facility] [address] [phone] --                  Expected Discharge Date and Time       Expected Discharge Date Expected Discharge Time    Nov 15, 2023            Demographic Summary       Row Name 11/15/23 1127       General Information    Admission Type observation    Reason for Consult discharge planning    Preferred Language English       Contact Information    Permission Granted to Share Info With ;family/designee                   Functional Status       Row Name 11/15/23 1127       Functional Status    Usual Activity Tolerance good       Functional Status, IADL    Medications independent    Meal Preparation independent    Housekeeping independent    Laundry independent    Shopping independent       Mental Status    General Appearance WDL WDL       Mental Status Summary    Recent Changes in Mental Status/Cognitive Functioning no changes                   Psychosocial       Row Name 11/15/23 1128       Intellectual Performance WDL    Level of Consciousness Alert       Coping/Stress    Patient Personal Strengths able to adapt    Sources of Support spouse    Reaction to Health Status accepting    Understanding of Condition and Treatment adequate understanding of medical condition;adequate understanding of treatment       Developmental Stage (Eriksson's)    Developmental Stage Stage 8 (65 years-death/Late Adulthood) Integrity vs. Despair                    Blank BLACKBURN, RN

## 2023-11-15 NOTE — PLAN OF CARE
Goal Outcome Evaluation:  Plan of Care Reviewed With: patient        Progress: improving  Outcome Evaluation: Pt seen for PT this am. He is POD 1 L TKR and presents w expected post op pain and weakness. HE is doing well this morning w minimal complaints. Pt able to perform bed mobility w SBA. He stood w CGA using Rwx and was able to ambulate approx 180 ft w Rwx and SBA/CGA. No unsteadiness noted. Pt also tolerating all knee exercises well. He plans home later today w family and HHPT. ALl questions answered at this time. He is safe to DC home from PT standpoint.      Anticipated Discharge Disposition (PT): home with assist, home with home health

## 2023-11-15 NOTE — PLAN OF CARE
Goal Outcome Evaluation:  Plan of Care Reviewed With: patient           Outcome Evaluation: Pt pleasanyt and cooperative with care. Able to ambuate per walker w/ assist x1 from stretcher to bed, multiple times from bed to the restroom. Pt was able to ambulate with Ambulation tech in the hallway without difficulty. Pt able to make needs known, ask for PRN pain meds as needed. Pt was able to void without difficulty prior to due time. DRessing was C,D & I , dressing was changed this morning per MD order.

## 2023-11-15 NOTE — THERAPY EVALUATION
Patient Name: Feliciano Noguera  : 1950    MRN: [id number]                              Today's Date: 11/15/2023       Admit Date: 2023    Visit Dx:     ICD-10-CM ICD-9-CM   1. Primary osteoarthritis of left knee  M17.12 715.16     Patient Active Problem List   Diagnosis    Precordial pain    Fatigue    Snoring    Uncontrolled daytime somnolence    Benign prostatic hyperplasia without lower urinary tract symptoms    Tobacco abuse    Right upper quadrant abdominal pain    Hematochezia    Epigastric pain    Diarrhea    OA (osteoarthritis) of knee    Facial numbness    Paresthesias    Myelopathy    Primary osteoarthritis of left knee     Past Medical History:   Diagnosis Date    Arthritis     Enlarged prostate     GERD (gastroesophageal reflux disease)     [place] (hard of hearing)     DEAF IN RIGHT EAR    Knee pain, left      Past Surgical History:   Procedure Laterality Date    CHOLECYSTECTOMY WITH INTRAOPERATIVE CHOLANGIOGRAM N/A 2017    Procedure: CHOLECYSTECTOMY LAPAROSCOPIC INTRAOPERATIVE CHOLANGIOGRAM;  Surgeon: Bola Patel MD;  Location: [facility];  Service:     COLONOSCOPY N/A 2009    Dr. Jamison Johnson    COLONOSCOPY N/A 2018    Procedure: COLONOSCOPY into cecum with biopsy;  Surgeon: Bola Patel MD;  Location: [facility] ENDOSCOPY;  Service: General    ENDOSCOPY N/A 12/15/2008    Dr. Jamison Johnson    ENDOSCOPY N/A 2018    Procedure: ESOPHAGOGASTRODUODENOSCOPY with biopsy;  Surgeon: Bola Patel MD;  Location: [facility] ENDOSCOPY;  Service: General    KNEE ARTHROSCOPY Left     PROSTATE BIOPSY      ROTATOR CUFF REPAIR Right 2009    Dr. Arik Harris    TOTAL KNEE ARTHROPLASTY Right 2019    Procedure: RIGHT TOTAL KNEE ARTHROPLASTY;  Surgeon: Bola Ramires MD;  Location: [facility] OR;  Service: Orthopedics      General Information       Row Name 11/15/23 1023          Physical Therapy Time and Intention    Document Type evaluation  -EJ      Mode of Treatment physical therapy  -EJ       Row Name 11/15/23 1023          General Information    Patient Profile Reviewed yes  -EJ     Prior Level of Function independent:;all household mobility;community mobility;ADL's  -EJ     Existing Precautions/Restrictions no known precautions/restrictions  -EJ     Barriers to Rehab none identified  -EJ       Row Name 11/15/23 1023          Living Environment    People in Home spouse  -EJ       Row Name 11/15/23 1023          Home Main Entrance    Number of Stairs, Main Entrance none  -EJ       Row Name 11/15/23 1023          Stairs Within Home, Primary    Number of Stairs, Within Home, Primary none  -EJ       [facility] Name 11/15/23 1023          Cognition    Orientation Status (Cognition) oriented x 4  -EJ       Row Name 11/15/23 1023          Safety Issues, Functional Mobility    Impairments Affecting Function (Mobility) strength;endurance/activity tolerance;range of motion (ROM)  -EJ               User Key  (r) = Recorded By, (t) = Taken By, (c) = Cosigned By      Initials Name Provider Type    EJ Mikaela Reid, PT Physical Therapist                   Mobility       Row Name 11/15/23 1024          Bed Mobility    Bed Mobility bed mobility (all) activities  -EJ     All Activities, [place] (Bed Mobility) standby assist  -       Row Name 11/15/23 1024          Sit-Stand Transfer    Sit-Stand [place] (Transfers) verbal cues;contact guard  -EJ     Assistive Device (Sit-Stand Transfers) walker, front-wheeled  -EJ       [facility] Name 11/15/23 1024          Gait/Stairs (Locomotion)    [place] Level (Gait) verbal cues;standby assist;contact guard  -EJ     Assistive Device (Gait) walker, front-wheeled  -EJ     Distance in Feet (Gait) 180  -EJ     Deviations/Abnormal Patterns (Gait) odilon decreased;antalgic;stride length decreased  -EJ     Bilateral Gait Deviations forward flexed posture;heel strike decreased  -EJ     Comment, (Gait/Stairs) cues for sequence  -EJ                User Key  (r) = Recorded By, (t) = Taken By, (c) = Cosigned By      Initials Name Provider Type    EJ Mikaela Reid, PT Physical Therapist                   Obj/Interventions       Row Name 11/15/23 1025          Range of Motion Comprehensive    General Range of Motion no range of motion deficits identified  -EJ     Comment, General Range of Motion x L knee  -EJ       Row Name 11/15/23 1025          Strength Comprehensive (MMT)    Comment, General Manual Muscle Testing (MMT) Assessment post op weakness  -       Row Name 11/15/23 1025          Motor Skills    Therapeutic Exercise --  L TKR protocol x 5 reps  -EJ               User Key  (r) = Recorded By, (t) = Taken By, (c) = Cosigned By      Initials Name Provider Type    Mikaela Jiang, PT Physical Therapist                   Goals/Plan    No documentation.                  Clinical Impression       Row Name 11/15/23 1026          Pain    Pretreatment Pain Rating 0/10 - no pain  -       Row Name 11/15/23 1026          Plan of Care Review    Plan of Care Reviewed With patient  -EJ     Progress improving  -EJ     Outcome Evaluation Pt seen for PT this am. He is POD 1 L TKR and presents w expected post op pain and weakness. HE is doing well this morning w minimal complaints. Pt able to perform bed mobility w SBA. He stood w CGA using Rwx and was able to ambulate approx 180 ft w Rwx and SBA/CGA. No unsteadiness noted. Pt also tolerating all knee exercises well. He plans home later today w family and HHPT. ALl questions answered at this time. He is safe to DC home from PT standpoint.  -       Row Name 11/15/23 1026          Therapy Assessment/Plan (PT)    Criteria for Skilled Interventions Met (PT) other (see comments)  DC home  -EJ     Therapy Frequency (PT) evaluation only  -       Row Name 11/15/23 1026          Positioning and Restraints    Pre-Treatment Position in bed  -EJ     Post Treatment Position bed  -EJ     In Bed notified  nsg;supine;call light within reach;encouraged to call for assist;exit alarm on  -EJ               User Key  (r) = Recorded By, (t) = Taken By, (c) = Cosigned By      Initials Name Provider Type    Mikaela Jiang, PT Physical Therapist                   Outcome Measures       Row Name 11/15/23 1030          How much help from another person do you currently need...    Turning from your back to your side while in flat bed without using bedrails? 4  -EJ     Moving from lying on back to sitting on the side of a flat bed without bedrails? 4  -EJ     Moving to and from a bed to a chair (including a wheelchair)? 3  -EJ     Standing up from a chair using your arms (e.g., wheelchair, bedside chair)? 3  -EJ     Climbing 3-5 steps with a railing? 3  -EJ     To walk in hospital room? 3  -EJ     AM-PAC 6 Clicks Score (PT) 20  -EJ     Highest Level of Mobility Goal 6 --> Walk 10 steps or more  -       Row Name 11/15/23 1030          Functional Assessment    Outcome Measure Options AM-PAC 6 Clicks Basic Mobility (PT)  -EJ               User Key  (r) = Recorded By, (t) = Taken By, (c) = Cosigned By      Initials Name Provider Type    Mikaela Jiang, PT Physical Therapist                                   PT Recommendation and Plan     Plan of Care Reviewed With: patient  Progress: improving  Outcome Evaluation: Pt seen for PT this am. He is POD 1 L TKR and presents w expected post op pain and weakness. HE is doing well this morning w minimal complaints. Pt able to perform bed mobility w SBA. He stood w CGA using Rwx and was able to ambulate approx 180 ft w Rwx and SBA/CGA. No unsteadiness noted. Pt also tolerating all knee exercises well. He plans home later today w family and HHPT. ALl questions answered at this time. He is safe to DC home from PT standpoint.     Time Calculation:         PT Charges       Row Name 11/15/23 1031             Time Calculation    Start Time 1008  -EJ      Stop Time 1021  -EJ      Time  Calculation (min) 13 min  -EJ      PT Received On 11/15/23  -EJ         Time Calculation- PT    Total Timed Code Minutes- PT 8 minute(s)  -EJ                User Key  (r) = Recorded By, (t) = Taken By, (c) = Cosigned By      Initials Name Provider Type    Mikaela Jiang, PT Physical Therapist                  Therapy Charges for Today       Code Description Service Date Service Provider Modifiers Qty    83021966917 HC PT EVAL LOW COMPLEXITY 3 11/15/2023 Mikaela Reid, PT GP 1    56842578394 HC PT THER PROC EA 15 MIN 11/15/2023 Mikaela Reid, PT GP 1            PT G-Codes  Outcome Measure Options: AM-PAC 6 Clicks Basic Mobility (PT)  AM-PAC 6 Clicks Score (PT): 20  PT Discharge Summary  Anticipated Discharge Disposition (PT): home with assist, home with home health    Mikaela Reid, PT  11/15/2023

## 2023-11-15 NOTE — PLAN OF CARE
Problem: Adult Inpatient Plan of Care  Goal: Plan of Care Review  Outcome: Adequate for Care Transition   Goal Outcome Evaluation:

## 2023-11-15 NOTE — PROGRESS NOTES
"  Orthopaedic Surgery  Progress Note  11/15/2023    Patients Name:  Feliciano Noguera  YOB: 1950  Age: 73 y.o.  Medical Records Number:  [id number]  Date of Admission: 11/14/2023    No complaints except appropriate pain and stiffness in the left knee    Vitals:  Vitals:    11/14/23 1900 11/14/23 1915 11/14/23 1930 11/15/23 0541   BP: 145/88 146/96 147/88 101/67   BP Location:    Left arm   Patient Position:    Lying   Pulse: 90 84 92 83   Resp: 18 18 18 16   Temp:   98.4 °F (36.9 °C) 97.8 °F (36.6 °C)   TempSrc:   Oral Oral   SpO2: 100% 100% 97% 95%   Weight:   83.9 kg (185 lb)    Height:   177.8 cm (70\")        LLE:  NVI, calf nontender, Sensation intact  No signs of DVT    Incision: clean, no signs of infection    Lab Results (last 24 hours)       Procedure Component Value Units Date/Time    Basic Metabolic Panel [[id number]]  (Abnormal) Collected: 11/15/23 0355    Specimen: Blood Updated: 11/15/23 0441     Glucose 128 mg/dL      BUN 10 mg/dL      Creatinine 0.88 mg/dL      Sodium 135 mmol/L      Potassium 4.1 mmol/L      Chloride 104 mmol/L      CO2 23.7 mmol/L      Calcium 8.5 mg/dL      BUN/Creatinine Ratio 11.4     Anion Gap 7.3 mmol/L      eGFR 89.7 mL/min/1.73     Narrative:      GFR Normal >60  Chronic Kidney Disease <60  Kidney Failure <15    The GFR formula is only valid for adults with stable renal function between ages 18 and 70.    CBC (No Diff) [[id number]]  (Abnormal) Collected: 11/15/23 0355    Specimen: Blood Updated: 11/15/23 0425     WBC 9.97 10*3/mm3      RBC 3.92 10*6/mm3      Hemoglobin 11.2 g/dL      Hematocrit 33.3 %      MCV 84.9 fL      MCH 28.6 pg      MCHC 33.6 g/dL      RDW 12.5 %      RDW-SD 38.1 fl      MPV 10.4 fL      Platelets 196 10*3/mm3             XR Knee 1 or 2 View Left    Result Date: 11/14/2023  Narrative: XR KNEE 1 OR 2 VW LEFT-11/14/2023  HISTORY: Postop left knee arthroplasty.  Distal femoral and proximal tibia components of the left knee prosthesis are well " seated with no abnormal surrounding bony lucencies. Soft tissue air and skin staples are seen. No unexpected findings are noted.      Impression: 1. Satisfactory postoperative appearance of the left knee.   This report was finalized on 11/14/2023 6:03 PM by Dr. Froilan Bowles M.D on Workstation: [id number]      XR Chest PA & Lateral, XR Knee 1 or 2 View Left    Result Date: 11/8/2023  Narrative: XR KNEE 1 OR 2 VW LEFT-, XR CHEST PA AND LATERAL-  HISTORY: 73-year-old male. Preoperative evaluation.  FINDINGS:  CHEST: Compared with 08/09/2021 and 05/24/2019 there is no convincing evidence for acute pneumonia, effusions, or CHF.  LEFT KNEE: There are advanced osteoarthritic changes, most prominent at the lateral tibiofemoral compartment. There may be a tiny joint effusion.  This report was finalized on 11/8/2023 7:28 AM by Dr. Audra Garza M.D on Workstation: [id number]       Assesment/Plan:    Procedures:  Left TKA  Postoperative Day: 1  Weightbearing Status:  WBAT with walker  DVT Prophylaxis:  ASA for DVT prophylaxis    Disposition:  Home with home health after PT today, if comfortable and mobilizing safely    Bola Orlando PA-C  [facility]  [address] [place], [place] [id number] [phone]    11/15/2023

## 2023-11-16 NOTE — CASE MANAGEMENT/SOCIAL WORK
Case Management Discharge Note      Final Note: dc home with Johnnie GUEVARA    Provided Post Acute Provider List?: N/A  N/A Provider List Comment: Declined; requests Johnnie GUEVARA.    Selected Continued Care - Discharged on 11/15/2023 Admission date: 11/14/2023 - Discharge disposition: Home or Self Care      Destination    No services have been selected for the patient.                Durable Medical Equipment    No services have been selected for the patient.                Dialysis/Infusion    No services have been selected for the patient.                Home Medical Care Coordination complete.      Service Provider Selected Services Address Phone Fax Patient Preferred    [facility] [address] [place] [id number] [phone] --              Therapy    No services have been selected for the patient.                Community Resources    No services have been selected for the patient.                Community & DME    No services have been selected for the patient.                    Transportation Services  Private: Car    Final Discharge Disposition Code: 06 - home with home health care

## 2024-01-10 ENCOUNTER — TELEPHONE (OUTPATIENT)
Dept: ORTHOPEDIC SURGERY | Facility: HOSPITAL | Age: 74
End: 2024-01-10
Payer: MEDICARE

## 2024-01-10 NOTE — TELEPHONE ENCOUNTER
Called and spoke with  Ms. Noguera as Mr. Noguera was at PT at this time. Called to see if he would be interested in going home after his upcoming shoulder surgery 1/17. She said they had told them that he would be staying overnight. He is still recovering from a recent knee surgery in November. Mr. Noguera is to be an overnight stay at this time. They were given my contact information should they need anything.

## 2024-01-11 ENCOUNTER — PRE-ADMISSION TESTING (OUTPATIENT)
Dept: PREADMISSION TESTING | Facility: HOSPITAL | Age: 74
End: 2024-01-11
Payer: MEDICARE

## 2024-01-11 ENCOUNTER — HOSPITAL ENCOUNTER (OUTPATIENT)
Dept: GENERAL RADIOLOGY | Facility: HOSPITAL | Age: 74
Discharge: HOME | End: 2024-01-11
Payer: MEDICARE

## 2024-01-11 VITALS
BODY MASS INDEX: 25.99 KG/M2 | OXYGEN SATURATION: 98 % | DIASTOLIC BLOOD PRESSURE: 84 MMHG | HEART RATE: 84 BPM | RESPIRATION RATE: 18 BRPM | HEIGHT: 68 IN | SYSTOLIC BLOOD PRESSURE: 134 MMHG | WEIGHT: 171.5 LBS | TEMPERATURE: 96.8 F

## 2024-01-11 LAB
ALBUMIN SERPL-MCNC: 4.3 G/DL (ref 3.5–5.2)
ALBUMIN/GLOB SERPL: 1.9 G/DL
ALP SERPL-CCNC: 86 U/L (ref 39–117)
ALT SERPL W P-5'-P-CCNC: 13 U/L (ref 1–41)
ANION GAP SERPL CALCULATED.3IONS-SCNC: 9 MMOL/L (ref 5–15)
APTT PPP: 27.4 SECONDS (ref 22.7–35.4)
AST SERPL-CCNC: 15 U/L (ref 1–40)
BACTERIA UR QL AUTO: NORMAL /HPF
BILIRUB SERPL-MCNC: 0.5 MG/DL (ref 0–1.2)
BILIRUB UR QL STRIP: NEGATIVE
BUN SERPL-MCNC: 13 MG/DL (ref 8–23)
BUN/CREAT SERPL: 12.1 (ref 7–25)
CALCIUM SPEC-SCNC: 9.5 MG/DL (ref 8.6–10.5)
CHLORIDE SERPL-SCNC: 104 MMOL/L (ref 98–107)
CLARITY UR: CLEAR
CO2 SERPL-SCNC: 24 MMOL/L (ref 22–29)
COLOR UR: YELLOW
CREAT SERPL-MCNC: 1.07 MG/DL (ref 0.76–1.27)
DEPRECATED RDW RBC AUTO: 38.6 FL (ref 37–54)
EGFRCR SERPLBLD CKD-EPI 2021: 72.4 ML/MIN/1.73
ERYTHROCYTE [DISTWIDTH] IN BLOOD BY AUTOMATED COUNT: 12.8 % (ref 12.3–15.4)
GLOBULIN UR ELPH-MCNC: 2.3 GM/DL
GLUCOSE SERPL-MCNC: 101 MG/DL (ref 65–99)
GLUCOSE UR STRIP-MCNC: NEGATIVE MG/DL
HCT VFR BLD AUTO: 39.5 % (ref 37.5–51)
HGB BLD-MCNC: 12.6 G/DL (ref 13–17.7)
HGB UR QL STRIP.AUTO: NEGATIVE
HYALINE CASTS UR QL AUTO: NORMAL /LPF
INR PPP: 1 (ref 0.9–1.1)
KETONES UR QL STRIP: NEGATIVE
LEUKOCYTE ESTERASE UR QL STRIP.AUTO: NEGATIVE
MCH RBC QN AUTO: 26.4 PG (ref 26.6–33)
MCHC RBC AUTO-ENTMCNC: 31.9 G/DL (ref 31.5–35.7)
MCV RBC AUTO: 82.8 FL (ref 79–97)
NITRITE UR QL STRIP: NEGATIVE
PH UR STRIP.AUTO: 5.5 [PH] (ref 5–8)
PLATELET # BLD AUTO: 238 10*3/MM3 (ref 140–450)
PMV BLD AUTO: 9.6 FL (ref 6–12)
POTASSIUM SERPL-SCNC: 4.4 MMOL/L (ref 3.5–5.2)
PROT SERPL-MCNC: 6.6 G/DL (ref 6–8.5)
PROT UR QL STRIP: NEGATIVE
PROTHROMBIN TIME: 13.3 SECONDS (ref 11.7–14.2)
RBC # BLD AUTO: 4.77 10*6/MM3 (ref 4.14–5.8)
RBC # UR STRIP: NORMAL /HPF
REF LAB TEST METHOD: NORMAL
SODIUM SERPL-SCNC: 137 MMOL/L (ref 136–145)
SP GR UR STRIP: 1.02 (ref 1–1.03)
SQUAMOUS #/AREA URNS HPF: NORMAL /HPF
UROBILINOGEN UR QL STRIP: NORMAL
WBC # UR STRIP: NORMAL /HPF
WBC NRBC COR # BLD AUTO: 5.37 10*3/MM3 (ref 3.4–10.8)

## 2024-01-11 PROCEDURE — 85027 COMPLETE CBC AUTOMATED: CPT

## 2024-01-11 PROCEDURE — 81001 URINALYSIS AUTO W/SCOPE: CPT | Performed by: ORTHOPAEDIC SURGERY

## 2024-01-11 PROCEDURE — 36415 COLL VENOUS BLD VENIPUNCTURE: CPT

## 2024-01-11 PROCEDURE — 80053 COMPREHEN METABOLIC PANEL: CPT

## 2024-01-11 PROCEDURE — 85610 PROTHROMBIN TIME: CPT

## 2024-01-11 PROCEDURE — 71046 X-RAY EXAM CHEST 2 VIEWS: CPT

## 2024-01-11 PROCEDURE — 85730 THROMBOPLASTIN TIME PARTIAL: CPT

## 2024-01-11 RX ORDER — MECLIZINE HYDROCHLORIDE 25 MG/1
25 TABLET ORAL 3 TIMES DAILY PRN
COMMUNITY
Start: 2023-11-29

## 2024-01-11 RX ORDER — TRAMADOL HYDROCHLORIDE 50 MG/1
50 TABLET ORAL AS NEEDED
COMMUNITY
Start: 2024-01-04 | End: 2024-01-18 | Stop reason: HOSPADM

## 2024-01-11 NOTE — DISCHARGE INSTRUCTIONS
Take the following medications the morning of surgery:  OMEPRAZOLE    THE HOSPITAL WILL CALL YOU 1-2 DAYS PRIOR TO SURGERY WITH YOUR ARRIVAL TIME.    If you are on prescription narcotic pain medication to control your pain you may also take that medication the morning of surgery.    General Instructions:  Do not eat solid food after midnight the night before surgery.  You may drink clear liquids day of surgery but must stop at least one hour before your hospital arrival time.  It is beneficial for you to have a clear drink that contains carbohydrates the day of surgery.  We suggest a 12 to 20 ounce bottle of Gatorade or Powerade for non-diabetic patients or a 12 to 20 ounce bottle of G2 or Powerade Zero for diabetic patients. (Pediatric patients, are not advised to drink a 12 to 20 ounce carbohydrate drink)    Clear liquids are liquids you can see through.  Nothing red in color.     Plain water                               Sports drinks  Sodas                                   Gelatin (Jell-O)  Fruit juices without pulp such as white grape juice and apple juice  Popsicles that contain no fruit or yogurt  Tea or coffee (no cream or milk added)  Gatorade / Powerade  G2 / Powerade Zero    Infants may have breast milk up to four hours before surgery.  Infants drinking formula may drink formula up to six hours before surgery.   Patients who avoid smoking, chewing tobacco and alcohol for 4 weeks prior to surgery have a reduced risk of post-operative complications.  Quit smoking as many days before surgery as you can.  Do not smoke, use chewing tobacco or drink alcohol the day of surgery.   If applicable bring your C-PAP/ BI-PAP machine in with you to preop day of surgery.  Bring any papers given to you in the doctor’s office.  Wear clean comfortable clothes.  Do not wear contact lenses, false eyelashes or make-up.  Bring a case for your glasses.   Bring crutches or walker if applicable.  Remove all piercings.  Leave  jewelry and any other valuables at home.  Hair extensions with metal clips must be removed prior to surgery.  The Pre-Admission Testing nurse will instruct you to bring medications if unable to obtain an accurate list in Pre-Admission Testing.        If you were given a blood bank ID arm band remember to bring it with you the day of surgery.    Day of surgery:  Your arrival time is approximately two hours before your scheduled surgery time.  Upon arrival, a Pre-op nurse and Anesthesiologist will review your health history, obtain vital signs, and answer questions you may have.  The only belongings needed at this time will be a list of your home medications and if applicable your C-PAP/BI-PAP machine.  A Pre-op nurse will start an IV and you may receive medication in preparation for surgery, including something to help you relax.     Please be aware that surgery does come with discomfort.  We want to make every effort to control your discomfort so please discuss any uncontrolled symptoms with your nurse.   Your doctor will most likely have prescribed pain medications.      If you are going home after surgery you will receive individualized written care instructions before being discharged.  A responsible adult must drive you to and from the hospital on the day of your surgery and stay with you for 24 hours.  Discharge prescriptions can be filled by the hospital pharmacy during regular pharmacy hours.  If you are having surgery late in the day/evening your prescription may be e-prescribed to your pharmacy.  Please verify your pharmacy hours or chose a 24 hour pharmacy to avoid not having access to your prescription because your pharmacy has closed for the day.    If you are staying overnight following surgery, you will be transported to your hospital room following the recovery period.  [facility] has all private rooms.    If you have any questions please call Pre-Admission Testing at  [phone].  Deductibles and co-payments are collected on the day of service. Please be prepared to pay the required co-pay, deductible or deposit on the day of service as defined by your plan.    Call your surgeon immediately if you experience any of the following symptoms:  Sore Throat  Shortness of Breath or difficulty breathing  Cough  Chills  Body soreness or muscle pain  Headache  Fever  New loss of taste or smell  Do not arrive for your surgery ill.  Your procedure will need to be rescheduled to another time.  You will need to call your physician before the day of surgery to avoid any unnecessary exposure to hospital staff as well as other patients.        PREVENTING INFECTION IN SHOULDER SURGICAL SITES     C. acnes is a bacteria that lives deep within follicles and pores of the skin. It is found in large numbers on the skin of the face, axilla (armpit), chest and back and is the primary bacteria to cause a surgical site infection after shoulder surgery.      Use of a Benzoyl Peroxide solution prior to shoulder surgery decreases C. acnes and reduces post-op infections.   Your surgeon has ordered 5% Benzoyl Peroxide wash to be used three times prior to your surgery.     Please read the following instructions carefully and bring this form with you the day of surgery.     General bathing instructions starting two days before your surgery:    Shower using a fresh bar of anti-bacterial soap (such as Dial) and clean washcloth.  Pay special attention to the neck, shoulder and armpit area.   Wash your hair as usual with your regular shampoo.   Rinse hair and body thoroughly with warm water (not hot water) to remove shampoo and residue.   Dry with a clean towel.              Sleep in a clean bed with clean clothing.  Do not allow pets to sleep with you.     For 2 days before surgery, avoid shaving with a razor because the razor can irritate skin and make it easier to develop an infection.    Any areas of open skin  can increase the risk of a post-operative wound infection by allowing bacteria to enter and travel throughout the body.  Notify your surgeon if you have any skin wounds / rashes even if it is not near the expected surgical site.  The area will need assessed to determine if surgery should be delayed until it is healed.      First application of 5% Benzoyl Peroxide Wash two nights before surgery:                                                                Wash neck, shoulder (front, back, side) and armpit   with warm water, rinse and dry - see picture.  Gently wash the same areas with the Benzoyl Peroxide   cleanser going away from the neck for 10-20 seconds.   Work into a full lather and leave on the skin for   2 minutes for greatest effect.  Rinse thoroughly with warm water, not hot water.                     Pat dry with a clean towel.                                                            Wash your hands thoroughly.  Do not apply lotion, powder, perfume or deodorant.   Put on clean clothes.    Second application the night before surgery:  Repeat the above steps.        Third application morning of surgery:  Repeat the above steps.    Due to shoulder pain or decreased range of motion of your shoulder and arm, you may need assistance washing under the arm or the back portion of your shoulder.     Avoid further washing the areas of the skin treated with Benzoyl Peroxide for at least 1 hour.    For your convenience, you may purchase Benzoyl Peroxide at [facility] retail pharmacy.     Warning:  Let your physician know if you are allergic to Benzoyl Peroxide or have very sensitive skin and cannot use it.   Stop using and contact your surgeon if you experience any excessive scaling, itching, swelling, skin irritation or other signs of a reaction.  Keep out of eyes, ears, nose and mouth.  Do not apply to sunburned, irritated or broken area on the skin.  Avoid unwanted problems with bleaching effect by following  these tips:  Wash hands after each use.  Avoid contact with hair, clothing, furnishings or carpeting.  Wear clean, old t-shirt or clothing to bed.   Use clean, old white pillow cases and sheets to avoid discoloring your bed linens.                                                                                                                                                                                                                                                                                   Please complete the checklist below, bring it with you to the hospital                               the day of surgery and give to the Pre-op Nurse     Preoperative Skin Prep Checklist        Patient Name Label             Enter dates and ?  boxes to indicate completed    Surgery Date: _______________ Regular Shower  Benzoyl Peroxide Wash   First Application:  2 days before_______________  (Stop shaving all body parts)           Morning or Evening                        Evening    Second Application:  1 day  before________________        Morning or Evening                          Evening   Third Application:  Day of Surgery______________                           Morning                   Morning

## 2024-01-17 ENCOUNTER — APPOINTMENT (OUTPATIENT)
Dept: GENERAL RADIOLOGY | Facility: HOSPITAL | Age: 74
End: 2024-01-17
Payer: MEDICARE

## 2024-01-17 ENCOUNTER — HOSPITAL ENCOUNTER (INPATIENT)
Facility: HOSPITAL | Age: 74
LOS: 1 days | Discharge: HOME | End: 2024-01-18
Attending: ORTHOPAEDIC SURGERY | Admitting: ORTHOPAEDIC SURGERY
Payer: MEDICARE

## 2024-01-17 ENCOUNTER — ANESTHESIA (OUTPATIENT)
Dept: PERIOP | Facility: HOSPITAL | Age: 74
End: 2024-01-17
Payer: MEDICARE

## 2024-01-17 ENCOUNTER — ANESTHESIA EVENT (OUTPATIENT)
Dept: PERIOP | Facility: HOSPITAL | Age: 74
End: 2024-01-17
Payer: MEDICARE

## 2024-01-17 DIAGNOSIS — Z96.612 S/P REVERSE TOTAL SHOULDER ARTHROPLASTY, LEFT: Primary | ICD-10-CM

## 2024-01-17 LAB — GLUCOSE BLDC GLUCOMTR-MCNC: 127 MG/DL (ref 70–130)

## 2024-01-17 PROCEDURE — C1713 ANCHOR/SCREW BN/BN,TIS/BN: HCPCS | Performed by: ORTHOPAEDIC SURGERY

## 2024-01-17 PROCEDURE — 25010000002 SUGAMMADEX 200 MG/2ML SOLUTION: Performed by: NURSE ANESTHETIST, CERTIFIED REGISTERED

## 2024-01-17 PROCEDURE — 25010000002 ROPIVACAINE PER 1 MG: Performed by: STUDENT IN AN ORGANIZED HEALTH CARE EDUCATION/TRAINING PROGRAM

## 2024-01-17 PROCEDURE — 93005 ELECTROCARDIOGRAM TRACING: CPT | Performed by: ORTHOPAEDIC SURGERY

## 2024-01-17 PROCEDURE — C1776 JOINT DEVICE (IMPLANTABLE): HCPCS | Performed by: ORTHOPAEDIC SURGERY

## 2024-01-17 PROCEDURE — 25010000002 ONDANSETRON PER 1 MG: Performed by: NURSE ANESTHETIST, CERTIFIED REGISTERED

## 2024-01-17 PROCEDURE — 25810000003 LACTATED RINGERS PER 1000 ML: Performed by: STUDENT IN AN ORGANIZED HEALTH CARE EDUCATION/TRAINING PROGRAM

## 2024-01-17 PROCEDURE — 93010 ELECTROCARDIOGRAM REPORT: CPT | Performed by: INTERNAL MEDICINE

## 2024-01-17 PROCEDURE — 25010000002 PROPOFOL 200 MG/20ML EMULSION: Performed by: NURSE ANESTHETIST, CERTIFIED REGISTERED

## 2024-01-17 PROCEDURE — 82948 REAGENT STRIP/BLOOD GLUCOSE: CPT

## 2024-01-17 PROCEDURE — 25010000002 HYDROMORPHONE PER 4 MG: Performed by: NURSE ANESTHETIST, CERTIFIED REGISTERED

## 2024-01-17 PROCEDURE — 73020 X-RAY EXAM OF SHOULDER: CPT

## 2024-01-17 PROCEDURE — 0RRK00Z REPLACEMENT OF LEFT SHOULDER JOINT WITH REVERSE BALL AND SOCKET SYNTHETIC SUBSTITUTE, OPEN APPROACH: ICD-10-PCS | Performed by: ORTHOPAEDIC SURGERY

## 2024-01-17 PROCEDURE — 25010000002 ESMOLOL 100 MG/10ML SOLUTION: Performed by: NURSE ANESTHETIST, CERTIFIED REGISTERED

## 2024-01-17 PROCEDURE — 25010000002 POTASSIUM CHLORIDE PER 2 MEQ: Performed by: ORTHOPAEDIC SURGERY

## 2024-01-17 PROCEDURE — 25010000002 DEXAMETHASONE PER 1 MG: Performed by: STUDENT IN AN ORGANIZED HEALTH CARE EDUCATION/TRAINING PROGRAM

## 2024-01-17 PROCEDURE — 25010000002 MIDAZOLAM PER 1 MG: Performed by: STUDENT IN AN ORGANIZED HEALTH CARE EDUCATION/TRAINING PROGRAM

## 2024-01-17 PROCEDURE — 25810000003 SODIUM CHLORIDE PER 500 ML: Performed by: ORTHOPAEDIC SURGERY

## 2024-01-17 PROCEDURE — 25010000002 CEFAZOLIN IN DEXTROSE 2-4 GM/100ML-% SOLUTION: Performed by: ORTHOPAEDIC SURGERY

## 2024-01-17 DEVICE — IMPLANTABLE DEVICE
Type: IMPLANTABLE DEVICE | Site: SHOULDER | Status: FUNCTIONAL
Brand: COMPREHENSIVE REVERSE SHOULDER

## 2024-01-17 DEVICE — IMPLANTABLE DEVICE
Type: IMPLANTABLE DEVICE | Site: SHOULDER | Status: FUNCTIONAL
Brand: COMPREHENSIVE® REVERSE SHOULDER

## 2024-01-17 DEVICE — KNOTLESS TISSUE CONTROL DEVICE, UNDYED UNIDIRECTIONAL (ANTIBACTERIAL) SYNTHETIC ABSORBABLE DEVICE
Type: IMPLANTABLE DEVICE | Site: SHOULDER | Status: FUNCTIONAL
Brand: STRATAFIX

## 2024-01-17 DEVICE — IMPLANTABLE DEVICE
Type: IMPLANTABLE DEVICE | Site: SHOULDER | Status: FUNCTIONAL
Brand: COMPREHENSIVE® SHOULDER SYSTEM

## 2024-01-17 DEVICE — TRY HUM/SHLDR COMPREHENSIVE/REVERSE MINI COCR STD PLS6: Type: IMPLANTABLE DEVICE | Site: SHOULDER | Status: FUNCTIONAL

## 2024-01-17 DEVICE — BEAR HUM PROLNG PLS3 36MM: Type: IMPLANTABLE DEVICE | Site: SHOULDER | Status: FUNCTIONAL

## 2024-01-17 DEVICE — TOTL SHLDER REV: Type: IMPLANTABLE DEVICE | Status: FUNCTIONAL

## 2024-01-17 RX ORDER — OXYCODONE HYDROCHLORIDE AND ACETAMINOPHEN 5; 325 MG/1; MG/1
1 TABLET ORAL EVERY 4 HOURS PRN
Status: DISCONTINUED | OUTPATIENT
Start: 2024-01-17 | End: 2024-01-18 | Stop reason: HOSPADM

## 2024-01-17 RX ORDER — ACETAMINOPHEN 650 MG/1
650 SUPPOSITORY RECTAL EVERY 4 HOURS PRN
Status: DISCONTINUED | OUTPATIENT
Start: 2024-01-17 | End: 2024-01-18 | Stop reason: HOSPADM

## 2024-01-17 RX ORDER — ONDANSETRON 2 MG/ML
4 INJECTION INTRAMUSCULAR; INTRAVENOUS ONCE AS NEEDED
Status: DISCONTINUED | OUTPATIENT
Start: 2024-01-17 | End: 2024-01-17 | Stop reason: HOSPADM

## 2024-01-17 RX ORDER — ROPIVACAINE HYDROCHLORIDE 5 MG/ML
INJECTION, SOLUTION EPIDURAL; INFILTRATION; PERINEURAL
Status: COMPLETED | OUTPATIENT
Start: 2024-01-17 | End: 2024-01-17

## 2024-01-17 RX ORDER — EPHEDRINE SULFATE 50 MG/ML
5 INJECTION, SOLUTION INTRAVENOUS ONCE AS NEEDED
Status: DISCONTINUED | OUTPATIENT
Start: 2024-01-17 | End: 2024-01-17 | Stop reason: HOSPADM

## 2024-01-17 RX ORDER — PROPOFOL 10 MG/ML
INJECTION, EMULSION INTRAVENOUS AS NEEDED
Status: DISCONTINUED | OUTPATIENT
Start: 2024-01-17 | End: 2024-01-17 | Stop reason: SURG

## 2024-01-17 RX ORDER — OXYCODONE HYDROCHLORIDE AND ACETAMINOPHEN 5; 325 MG/1; MG/1
2 TABLET ORAL EVERY 4 HOURS PRN
Status: DISCONTINUED | OUTPATIENT
Start: 2024-01-17 | End: 2024-01-18 | Stop reason: HOSPADM

## 2024-01-17 RX ORDER — ESMOLOL HYDROCHLORIDE 10 MG/ML
INJECTION INTRAVENOUS AS NEEDED
Status: DISCONTINUED | OUTPATIENT
Start: 2024-01-17 | End: 2024-01-17 | Stop reason: SURG

## 2024-01-17 RX ORDER — DIPHENHYDRAMINE HYDROCHLORIDE 50 MG/ML
12.5 INJECTION INTRAMUSCULAR; INTRAVENOUS
Status: DISCONTINUED | OUTPATIENT
Start: 2024-01-17 | End: 2024-01-17 | Stop reason: HOSPADM

## 2024-01-17 RX ORDER — SODIUM CHLORIDE 0.9 % (FLUSH) 0.9 %
3 SYRINGE (ML) INJECTION EVERY 12 HOURS SCHEDULED
Status: DISCONTINUED | OUTPATIENT
Start: 2024-01-17 | End: 2024-01-17 | Stop reason: HOSPADM

## 2024-01-17 RX ORDER — DROPERIDOL 2.5 MG/ML
0.62 INJECTION, SOLUTION INTRAMUSCULAR; INTRAVENOUS
Status: DISCONTINUED | OUTPATIENT
Start: 2024-01-17 | End: 2024-01-17 | Stop reason: HOSPADM

## 2024-01-17 RX ORDER — DOCUSATE SODIUM 100 MG/1
200 CAPSULE, LIQUID FILLED ORAL DAILY
Status: DISCONTINUED | OUTPATIENT
Start: 2024-01-17 | End: 2024-01-18 | Stop reason: HOSPADM

## 2024-01-17 RX ORDER — LABETALOL HYDROCHLORIDE 5 MG/ML
5 INJECTION, SOLUTION INTRAVENOUS
Status: DISCONTINUED | OUTPATIENT
Start: 2024-01-17 | End: 2024-01-17 | Stop reason: HOSPADM

## 2024-01-17 RX ORDER — PROMETHAZINE HYDROCHLORIDE 25 MG/1
25 SUPPOSITORY RECTAL ONCE AS NEEDED
Status: DISCONTINUED | OUTPATIENT
Start: 2024-01-17 | End: 2024-01-17 | Stop reason: HOSPADM

## 2024-01-17 RX ORDER — TAMSULOSIN HYDROCHLORIDE 0.4 MG/1
0.4 CAPSULE ORAL NIGHTLY
Status: DISCONTINUED | OUTPATIENT
Start: 2024-01-17 | End: 2024-01-18 | Stop reason: HOSPADM

## 2024-01-17 RX ORDER — ROCURONIUM BROMIDE 10 MG/ML
INJECTION, SOLUTION INTRAVENOUS AS NEEDED
Status: DISCONTINUED | OUTPATIENT
Start: 2024-01-17 | End: 2024-01-17 | Stop reason: SURG

## 2024-01-17 RX ORDER — DOCUSATE SODIUM 100 MG/1
100 CAPSULE, LIQUID FILLED ORAL 2 TIMES DAILY PRN
Status: DISCONTINUED | OUTPATIENT
Start: 2024-01-17 | End: 2024-01-18 | Stop reason: HOSPADM

## 2024-01-17 RX ORDER — NALOXONE HCL 0.4 MG/ML
0.1 VIAL (ML) INJECTION
Status: DISCONTINUED | OUTPATIENT
Start: 2024-01-17 | End: 2024-01-18 | Stop reason: HOSPADM

## 2024-01-17 RX ORDER — ONDANSETRON 2 MG/ML
INJECTION INTRAMUSCULAR; INTRAVENOUS AS NEEDED
Status: DISCONTINUED | OUTPATIENT
Start: 2024-01-17 | End: 2024-01-17 | Stop reason: SURG

## 2024-01-17 RX ORDER — ONDANSETRON 2 MG/ML
4 INJECTION INTRAMUSCULAR; INTRAVENOUS EVERY 6 HOURS PRN
Status: DISCONTINUED | OUTPATIENT
Start: 2024-01-17 | End: 2024-01-18 | Stop reason: HOSPADM

## 2024-01-17 RX ORDER — PANTOPRAZOLE SODIUM 40 MG/1
40 TABLET, DELAYED RELEASE ORAL
Status: DISCONTINUED | OUTPATIENT
Start: 2024-01-18 | End: 2024-01-18 | Stop reason: HOSPADM

## 2024-01-17 RX ORDER — DEXAMETHASONE SODIUM PHOSPHATE 4 MG/ML
INJECTION, SOLUTION INTRA-ARTICULAR; INTRALESIONAL; INTRAMUSCULAR; INTRAVENOUS; SOFT TISSUE
Status: COMPLETED | OUTPATIENT
Start: 2024-01-17 | End: 2024-01-17

## 2024-01-17 RX ORDER — SODIUM CHLORIDE 9 MG/ML
INJECTION, SOLUTION INTRAVENOUS AS NEEDED
Status: DISCONTINUED | OUTPATIENT
Start: 2024-01-17 | End: 2024-01-17 | Stop reason: HOSPADM

## 2024-01-17 RX ORDER — MECLIZINE HYDROCHLORIDE 25 MG/1
25 TABLET ORAL 3 TIMES DAILY PRN
Status: DISCONTINUED | OUTPATIENT
Start: 2024-01-17 | End: 2024-01-18 | Stop reason: HOSPADM

## 2024-01-17 RX ORDER — FENTANYL CITRATE 50 UG/ML
25 INJECTION, SOLUTION INTRAMUSCULAR; INTRAVENOUS
Status: DISCONTINUED | OUTPATIENT
Start: 2024-01-17 | End: 2024-01-17 | Stop reason: HOSPADM

## 2024-01-17 RX ORDER — ONDANSETRON 4 MG/1
4 TABLET, ORALLY DISINTEGRATING ORAL EVERY 6 HOURS PRN
Status: DISCONTINUED | OUTPATIENT
Start: 2024-01-17 | End: 2024-01-18 | Stop reason: HOSPADM

## 2024-01-17 RX ORDER — OXYCODONE HYDROCHLORIDE AND ACETAMINOPHEN 5; 325 MG/1; MG/1
1 TABLET ORAL EVERY 4 HOURS PRN
Qty: 40 TABLET | Refills: 0 | Status: SHIPPED | OUTPATIENT
Start: 2024-01-17 | End: 2024-01-17 | Stop reason: ALTCHOICE

## 2024-01-17 RX ORDER — SODIUM CHLORIDE 0.9 % (FLUSH) 0.9 %
3-10 SYRINGE (ML) INJECTION AS NEEDED
Status: DISCONTINUED | OUTPATIENT
Start: 2024-01-17 | End: 2024-01-17 | Stop reason: HOSPADM

## 2024-01-17 RX ORDER — NALOXONE HCL 0.4 MG/ML
0.2 VIAL (ML) INJECTION AS NEEDED
Status: DISCONTINUED | OUTPATIENT
Start: 2024-01-17 | End: 2024-01-17 | Stop reason: HOSPADM

## 2024-01-17 RX ORDER — PHENYLEPHRINE HCL IN 0.9% NACL 1 MG/10 ML
SYRINGE (ML) INTRAVENOUS AS NEEDED
Status: DISCONTINUED | OUTPATIENT
Start: 2024-01-17 | End: 2024-01-17 | Stop reason: SURG

## 2024-01-17 RX ORDER — LIDOCAINE HYDROCHLORIDE 10 MG/ML
0.5 INJECTION, SOLUTION INFILTRATION; PERINEURAL ONCE AS NEEDED
Status: DISCONTINUED | OUTPATIENT
Start: 2024-01-17 | End: 2024-01-17 | Stop reason: HOSPADM

## 2024-01-17 RX ORDER — FLUMAZENIL 0.1 MG/ML
0.2 INJECTION INTRAVENOUS AS NEEDED
Status: DISCONTINUED | OUTPATIENT
Start: 2024-01-17 | End: 2024-01-17 | Stop reason: HOSPADM

## 2024-01-17 RX ORDER — HYDRALAZINE HYDROCHLORIDE 20 MG/ML
5 INJECTION INTRAMUSCULAR; INTRAVENOUS
Status: DISCONTINUED | OUTPATIENT
Start: 2024-01-17 | End: 2024-01-17 | Stop reason: HOSPADM

## 2024-01-17 RX ORDER — PROMETHAZINE HYDROCHLORIDE 25 MG/1
25 TABLET ORAL ONCE AS NEEDED
Status: DISCONTINUED | OUTPATIENT
Start: 2024-01-17 | End: 2024-01-17 | Stop reason: HOSPADM

## 2024-01-17 RX ORDER — IPRATROPIUM BROMIDE AND ALBUTEROL SULFATE 2.5; .5 MG/3ML; MG/3ML
3 SOLUTION RESPIRATORY (INHALATION) ONCE AS NEEDED
Status: DISCONTINUED | OUTPATIENT
Start: 2024-01-17 | End: 2024-01-17 | Stop reason: HOSPADM

## 2024-01-17 RX ORDER — MIDAZOLAM HYDROCHLORIDE 1 MG/ML
0.5 INJECTION INTRAMUSCULAR; INTRAVENOUS
Status: DISCONTINUED | OUTPATIENT
Start: 2024-01-17 | End: 2024-01-17 | Stop reason: HOSPADM

## 2024-01-17 RX ORDER — CEFAZOLIN SODIUM 2 G/100ML
2000 INJECTION, SOLUTION INTRAVENOUS EVERY 8 HOURS
Qty: 200 ML | Refills: 0 | Status: COMPLETED | OUTPATIENT
Start: 2024-01-17 | End: 2024-01-18

## 2024-01-17 RX ORDER — MAGNESIUM HYDROXIDE 1200 MG/15ML
LIQUID ORAL AS NEEDED
Status: DISCONTINUED | OUTPATIENT
Start: 2024-01-17 | End: 2024-01-17 | Stop reason: HOSPADM

## 2024-01-17 RX ORDER — LIDOCAINE HYDROCHLORIDE 20 MG/ML
INJECTION, SOLUTION EPIDURAL; INFILTRATION; INTRACAUDAL; PERINEURAL AS NEEDED
Status: DISCONTINUED | OUTPATIENT
Start: 2024-01-17 | End: 2024-01-17 | Stop reason: SURG

## 2024-01-17 RX ORDER — HYDROMORPHONE HYDROCHLORIDE 1 MG/ML
0.25 INJECTION, SOLUTION INTRAMUSCULAR; INTRAVENOUS; SUBCUTANEOUS
Status: DISCONTINUED | OUTPATIENT
Start: 2024-01-17 | End: 2024-01-17 | Stop reason: HOSPADM

## 2024-01-17 RX ORDER — ACETAMINOPHEN 325 MG/1
650 TABLET ORAL EVERY 4 HOURS PRN
Status: DISCONTINUED | OUTPATIENT
Start: 2024-01-17 | End: 2024-01-18 | Stop reason: HOSPADM

## 2024-01-17 RX ORDER — PROMETHAZINE HYDROCHLORIDE 25 MG/1
25 TABLET ORAL EVERY 6 HOURS PRN
Status: DISCONTINUED | OUTPATIENT
Start: 2024-01-17 | End: 2024-01-18 | Stop reason: HOSPADM

## 2024-01-17 RX ORDER — CEFAZOLIN SODIUM 2 G/100ML
2000 INJECTION, SOLUTION INTRAVENOUS ONCE
Status: COMPLETED | OUTPATIENT
Start: 2024-01-17 | End: 2024-01-17

## 2024-01-17 RX ORDER — SODIUM CHLORIDE, SODIUM LACTATE, POTASSIUM CHLORIDE, CALCIUM CHLORIDE 600; 310; 30; 20 MG/100ML; MG/100ML; MG/100ML; MG/100ML
9 INJECTION, SOLUTION INTRAVENOUS CONTINUOUS
Status: DISCONTINUED | OUTPATIENT
Start: 2024-01-17 | End: 2024-01-18 | Stop reason: HOSPADM

## 2024-01-17 RX ORDER — TRAMADOL HYDROCHLORIDE 50 MG/1
50 TABLET ORAL ONCE AS NEEDED
Status: COMPLETED | OUTPATIENT
Start: 2024-01-17 | End: 2024-01-17

## 2024-01-17 RX ORDER — SODIUM CHLORIDE AND POTASSIUM CHLORIDE 150; 450 MG/100ML; MG/100ML
50 INJECTION, SOLUTION INTRAVENOUS CONTINUOUS
Status: DISCONTINUED | OUTPATIENT
Start: 2024-01-17 | End: 2024-01-18 | Stop reason: HOSPADM

## 2024-01-17 RX ADMIN — Medication 100 MCG: at 09:59

## 2024-01-17 RX ADMIN — PROPOFOL 150 MG: 10 INJECTION, EMULSION INTRAVENOUS at 08:50

## 2024-01-17 RX ADMIN — PROPOFOL 50 MG: 10 INJECTION, EMULSION INTRAVENOUS at 09:51

## 2024-01-17 RX ADMIN — ROCURONIUM BROMIDE 10 MG: 10 INJECTION, SOLUTION INTRAVENOUS at 09:51

## 2024-01-17 RX ADMIN — ROCURONIUM BROMIDE 50 MG: 10 INJECTION, SOLUTION INTRAVENOUS at 08:50

## 2024-01-17 RX ADMIN — CEFAZOLIN SODIUM 2000 MG: 2 INJECTION, SOLUTION INTRAVENOUS at 08:37

## 2024-01-17 RX ADMIN — MIDAZOLAM 1 MG: 1 INJECTION INTRAMUSCULAR; INTRAVENOUS at 07:33

## 2024-01-17 RX ADMIN — CEFAZOLIN SODIUM 2000 MG: 2 INJECTION, SOLUTION INTRAVENOUS at 16:55

## 2024-01-17 RX ADMIN — DEXAMETHASONE SODIUM PHOSPHATE 4 MG: 4 INJECTION, SOLUTION INTRA-ARTICULAR; INTRALESIONAL; INTRAMUSCULAR; INTRAVENOUS; SOFT TISSUE at 07:35

## 2024-01-17 RX ADMIN — DOCUSATE SODIUM 100 MG: 100 CAPSULE, LIQUID FILLED ORAL at 20:32

## 2024-01-17 RX ADMIN — HYDROMORPHONE HYDROCHLORIDE 0.25 MG: 1 INJECTION, SOLUTION INTRAMUSCULAR; INTRAVENOUS; SUBCUTANEOUS at 11:15

## 2024-01-17 RX ADMIN — POTASSIUM CHLORIDE AND SODIUM CHLORIDE 50 ML/HR: 450; 150 INJECTION, SOLUTION INTRAVENOUS at 20:38

## 2024-01-17 RX ADMIN — CEFAZOLIN SODIUM 2000 MG: 2 INJECTION, SOLUTION INTRAVENOUS at 23:55

## 2024-01-17 RX ADMIN — OXYCODONE HYDROCHLORIDE AND ACETAMINOPHEN 1 TABLET: 5; 325 TABLET ORAL at 15:14

## 2024-01-17 RX ADMIN — ROPIVACAINE HYDROCHLORIDE 20 ML: 5 INJECTION EPIDURAL; INFILTRATION; PERINEURAL at 07:35

## 2024-01-17 RX ADMIN — SUGAMMADEX 300 MG: 100 INJECTION, SOLUTION INTRAVENOUS at 10:04

## 2024-01-17 RX ADMIN — HYDROMORPHONE HYDROCHLORIDE 0.25 MG: 1 INJECTION, SOLUTION INTRAMUSCULAR; INTRAVENOUS; SUBCUTANEOUS at 11:27

## 2024-01-17 RX ADMIN — LIDOCAINE HYDROCHLORIDE 100 MG: 20 INJECTION, SOLUTION EPIDURAL; INFILTRATION; INTRACAUDAL; PERINEURAL at 08:50

## 2024-01-17 RX ADMIN — OXYCODONE HYDROCHLORIDE AND ACETAMINOPHEN 1 TABLET: 5; 325 TABLET ORAL at 20:30

## 2024-01-17 RX ADMIN — TRAMADOL HYDROCHLORIDE 50 MG: 50 TABLET ORAL at 11:15

## 2024-01-17 RX ADMIN — Medication 100 MCG: at 09:24

## 2024-01-17 RX ADMIN — TAMSULOSIN HYDROCHLORIDE 0.4 MG: 0.4 CAPSULE ORAL at 20:31

## 2024-01-17 RX ADMIN — ESMOLOL HYDROCHLORIDE 35 MG: 100 INJECTION, SOLUTION INTRAVENOUS at 08:50

## 2024-01-17 RX ADMIN — ONDANSETRON 4 MG: 2 INJECTION INTRAMUSCULAR; INTRAVENOUS at 10:02

## 2024-01-17 RX ADMIN — SODIUM CHLORIDE, POTASSIUM CHLORIDE, SODIUM LACTATE AND CALCIUM CHLORIDE: 600; 310; 30; 20 INJECTION, SOLUTION INTRAVENOUS at 09:18

## 2024-01-17 RX ADMIN — SODIUM CHLORIDE, POTASSIUM CHLORIDE, SODIUM LACTATE AND CALCIUM CHLORIDE 9 ML/HR: 600; 310; 30; 20 INJECTION, SOLUTION INTRAVENOUS at 07:25

## 2024-01-17 NOTE — ANESTHESIA PREPROCEDURE EVALUATION
Anesthesia Evaluation     Patient summary reviewed and Nursing notes reviewed   no history of anesthetic complications:   NPO Solid Status: > 8 hours  NPO Liquid Status: > 2 hours           Airway   Mallampati: II  TM distance: >3 FB  Neck ROM: full  Dental      Pulmonary    Cardiovascular     ECG reviewed      ROS comment: EKG SR w/ RBBB    Neuro/Psych  GI/Hepatic/Renal/Endo    (+) GERD    Musculoskeletal     Abdominal    Substance History      OB/GYN          Other   arthritis,                       Anesthesia Plan    ASA 2     general with block     intravenous induction     Anesthetic plan, risks, benefits, and alternatives have been provided, discussed and informed consent has been obtained with: patient.        CODE STATUS:

## 2024-01-17 NOTE — OP NOTE
TOTAL SHOULDER REVERSE ARTHROPLASTY  Procedure Note    Feliciano Noguera  1/17/2024    Pre-op Diagnosis:   1.  Left rotator cuff arthropathy  2.   3.     Post-op Diagnosis:     1.  Same  2.   3.     Procedure(s):  1.  Left reverse total shoulder arthroplasty  2.   3.   4.     Surgeon(s):  Anoop Soliman MD    Anesthesia: General with Block  Anesthesiologist: Aries Novak MD  CRNA: Niurka Bain CRNA    Staff:   Circulator: Bobbi Newsome RN  Scrub Person: Víctor Pitts  Vendor Representative: Aubrey Gutierrez  Assistant: Collin Gotti PA-C      Drains: None    Estimated Blood Loss: minimal    Specimen: * No orders in the log *    Description of Procedure: Following induction of anesthesia the patient was placed in the beachchair position.  The left shoulder was then prepped and draped in a sterile fashion.  I marked the skin for deltopectoral approach and cover the skin with Ioban.  I incised beginning just proximal and lateral to the coracoid process toward the axillary fold.  Subcutaneous tissue was bluntly dissected exposing the deltopectoral interval.  The cephalic vein and deltoid were retracted laterally while the pectoralis was retracted medially.  I released the superior 2 cm of the pectoralis tendon.  The clavipectoral fascia was incised and the strap muscles were retracted medially.  The coracohumeral ligament was then sharply released.  The humeral head was found to be high riding and in contact with the coracoid process.  No biceps tendon was identified.  I used the cautery to perform a T-shaped capsulotomy releasing the superior scarred capsule and the anterior remnant of the subscapularis.  I then released the capsule inferiorly allowing us to actually rotate the articular surface into the operative field.  I then placed the cutting guide for the Biomet comprehensive system against the anterior cortex of the humerus and pinned it in place.  We made our proximal humeral cut with the  oscillating saw.  We then prepared the proximal humeral canal for implantation of the stem.  Using first the T-handled awl and then broaches we found that the size 14 mini comprehensive broach gave the best fit of the surrounding bone.  The broach was left in place while we address the glenoid.     I placed 2 posterior and 1 anterior glenoid retractor.  He was found to have some mild anterior wear and some scapular protraction but the glenoid was in otherwise good condition.  I placed the guide for the baseplate into the central portion of the glenoid and placed our Steinmann pin to the appropriate coronal angle into the appropriate depth.  We then overreamed the Steinmann pin then impacted our baseplate into place.  The baseplate was then secured with 1 large central and 4 peripheral locking screws.      I then performed a trial placement of the glenosphere and found that the C offset position rotated posteriorly gave the best coverage of the glenoid surface.  We then dried the trunnion of the baseplate and impacted our glenosphere into place.  I performed a trial reduction with the trial stem and found that the +6 offset standard thickness tray with +3 bearing gave good range of motion and stability.      We then removed the trial stem, irrigated and dried the proximal humeral canal then impacted our size 14 mini comprehensive stem into place.  I then repeated our trial reduction and again found the +6 offset tray with +3 thickness bearing gave good stability and range of motion.  We removed the trial tray, dried the trunnion of the humeral stem then impacted our tray and bearing into place.      The shoulder was then reduced and extensively irrigated.  We then irrigated and performed closure using 0 Vicryl in a running fashion to close the deltopectoral interval, 2-0 strata fix to close subcutaneous tissue and staples to close the skin.  He was then placed into a soft sterile dressing in his postop sling.  He  will be discharged to recovery and then home and his prognosis is good.     Collin Gotti PA-C assisted me in this procedure.  His presence was necessary to help with holding the patient's limb and the retractors.  He allowed me to perform the procedure in a much quicker fashion resulting in less morbidity and risk for the patient.  An extra set of skilled sugical hands was necessary to effectively perform this procedure.     Findings: See Dictation     Complications: None        Anoop Soliman MD    Findings: See Dictation    Complications: None      Anoop Soliman MD     Date: 1/17/2024  Time: 10:04 EST

## 2024-01-17 NOTE — ANESTHESIA PROCEDURE NOTES
Peripheral Block    Pre-sedation assessment completed: 1/17/2024 7:30 AM    Patient reassessed immediately prior to procedure    Patient location during procedure: pre-op  Start time: 1/17/2024 7:35 AM  Stop time: 1/17/2024 7:40 AM  Reason for block: at surgeon's request and post-op pain management  Performed by  Anesthesiologist: Aries Novak MD  Preanesthetic Checklist  Completed: patient identified, IV checked, site marked, risks and benefits discussed, surgical consent, monitors and equipment checked, pre-op evaluation and timeout performed  Prep:  Pt Position: supine  Sterile barriers:cap, gloves and mask  Prep: ChloraPrep  Patient monitoring: blood pressure monitoring, continuous pulse oximetry and EKG  Procedure    Sedation: yes  Performed under: local infiltration  Guidance:ultrasound guided    ULTRASOUND INTERPRETATION.  Using ultrasound guidance a 21 G gauge needle was placed in close proximity to the nerve, at which point, under ultrasound guidance anesthetic was injected in the area of the nerve and spread of the anesthesia was seen on ultrasound in close proximity thereto.  There were no abnormalities seen on ultrasound; a digital image was taken; and the patient tolerated the procedure with no complications. Images:still images obtained, printed/placed on chart    Laterality:left  Block Type:interscalene  Injection Technique:single-shot  Needle Type:echogenic and Tuohy  Needle Gauge:21 G  Resistance on Injection: none    Medications Used: dexamethasone (DECADRON) injection - Injection   4 mg - 1/17/2024 7:35:00 AM  ropivacaine (NAROPIN) 0.5 % injection - Injection   20 mL - 1/17/2024 7:35:00 AM      Post Assessment  Injection Assessment: negative aspiration for heme, no paresthesia on injection and incremental injection  Patient Tolerance:comfortable throughout block  Complications:no  Additional Notes  Ultrasound guidance used to visualize nerve anatomy, guide needle placement and verify local  anesthetic disbursement.

## 2024-01-17 NOTE — PLAN OF CARE
Goal Outcome Evaluation:  Plan of Care Reviewed With: patient        Progress: improving  Outcome Evaluation: Admit s/p LRTSA. A&Ox4. Algaaciq. Bulky dressing in place, CDI. Sling in place. SB assist. Voiding function intact. Pain controlled. Plans to D/C home with family. Education provided.

## 2024-01-17 NOTE — ANESTHESIA POSTPROCEDURE EVALUATION
Patient: Feliciano Noguera    Procedure Summary       Date: 01/17/24 Room / Location:  [facility] [address] [place] [address] [facility]    Anesthesia Start: 0842 Anesthesia Stop: 1027    Procedure: TOTAL SHOULDER REVERSE ARTHROPLASTY (Left: Shoulder) Diagnosis:     Surgeons: Anoop Soliman MD Provider: Aries Novak MD    Anesthesia Type: general with block ASA Status: 2            Anesthesia Type: general with block    Vitals  Vitals Value Taken Time   /79 01/17/24 1045   Temp 36.3 °C (97.4 °F) 01/17/24 1024   Pulse 83 01/17/24 1045   Resp 14 01/17/24 1030   SpO2 100 % 01/17/24 1045   Vitals shown include unfiled device data.        Post Anesthesia Care and Evaluation    Patient location during evaluation: bedside  Patient participation: complete - patient participated  Level of consciousness: awake and alert  Pain management: adequate    Airway patency: patent  Anesthetic complications: No anesthetic complications  PONV Status: controlled  Cardiovascular status: blood pressure returned to baseline and acceptable  Respiratory status: acceptable  Hydration status: acceptable

## 2024-01-17 NOTE — ANESTHESIA PROCEDURE NOTES
Airway  Urgency: elective    Date/Time: 1/17/2024 8:54 AM  Airway not difficult    General Information and Staff    Patient location during procedure: OR  Anesthesiologist: Aries Novak MD  CRNA/CAA: Niurka Bain CRNA    Indications and Patient Condition  Indications for airway management: airway protection    Preoxygenated: yes  MILS maintained throughout  Mask difficulty assessment: 2 - vent by mask + OA or adjuvant +/- NMBA    Final Airway Details  Final airway type: endotracheal airway      Successful airway: ETT  Cuffed: yes   Successful intubation technique: direct laryngoscopy  Facilitating devices/methods: intubating stylet and cricoid pressure  Endotracheal tube insertion site: oral  Blade: Dixon  Blade size: 2  ETT size (mm): 8.0  Cormack-Lehane Classification: grade I - full view of glottis  Placement verified by: chest auscultation and capnometry   Cuff volume (mL): 10  Measured from: teeth  ETT/EBT  to teeth (cm): 21  Number of attempts at approach: 1  Assessment: lips, teeth, and gum same as pre-op and atraumatic intubation

## 2024-01-17 NOTE — SIGNIFICANT NOTE
01/17/24 1251   OTHER   Discipline occupational therapist   Rehab Time/Intention   Session Not Performed other (see comments)  (follow-up POD1 s/p TSA)   Recommendation   OT - Next Appointment 01/18/24

## 2024-01-18 VITALS
DIASTOLIC BLOOD PRESSURE: 83 MMHG | OXYGEN SATURATION: 96 % | SYSTOLIC BLOOD PRESSURE: 130 MMHG | WEIGHT: 171.52 LBS | HEIGHT: 68 IN | RESPIRATION RATE: 18 BRPM | HEART RATE: 89 BPM | BODY MASS INDEX: 25.99 KG/M2 | TEMPERATURE: 97.6 F

## 2024-01-18 PROBLEM — R00.0 SINUS TACHYCARDIA: Status: RESOLVED | Noted: 2024-01-18 | Resolved: 2024-01-18

## 2024-01-18 PROBLEM — R00.0 SINUS TACHYCARDIA: Status: ACTIVE | Noted: 2024-01-18

## 2024-01-18 LAB
HCT VFR BLD AUTO: 30.7 % (ref 37.5–51)
HGB BLD-MCNC: 10.2 G/DL (ref 13–17.7)
QT INTERVAL: 332 MS
QTC INTERVAL: 464 MS

## 2024-01-18 PROCEDURE — 25010000002 ONDANSETRON PER 1 MG: Performed by: ORTHOPAEDIC SURGERY

## 2024-01-18 PROCEDURE — 85014 HEMATOCRIT: CPT | Performed by: ORTHOPAEDIC SURGERY

## 2024-01-18 PROCEDURE — 97166 OT EVAL MOD COMPLEX 45 MIN: CPT

## 2024-01-18 PROCEDURE — 85018 HEMOGLOBIN: CPT | Performed by: ORTHOPAEDIC SURGERY

## 2024-01-18 PROCEDURE — 97535 SELF CARE MNGMENT TRAINING: CPT

## 2024-01-18 RX ORDER — OXYCODONE HYDROCHLORIDE AND ACETAMINOPHEN 5; 325 MG/1; MG/1
1 TABLET ORAL EVERY 4 HOURS PRN
Qty: 40 TABLET | Refills: 0 | Status: SHIPPED | OUTPATIENT
Start: 2024-01-18

## 2024-01-18 RX ADMIN — OXYCODONE HYDROCHLORIDE AND ACETAMINOPHEN 1 TABLET: 5; 325 TABLET ORAL at 08:16

## 2024-01-18 RX ADMIN — PANTOPRAZOLE SODIUM 40 MG: 40 TABLET, DELAYED RELEASE ORAL at 05:54

## 2024-01-18 RX ADMIN — OXYCODONE HYDROCHLORIDE AND ACETAMINOPHEN 1 TABLET: 5; 325 TABLET ORAL at 04:32

## 2024-01-18 RX ADMIN — OXYCODONE HYDROCHLORIDE AND ACETAMINOPHEN 2 TABLET: 5; 325 TABLET ORAL at 12:02

## 2024-01-18 RX ADMIN — ONDANSETRON 4 MG: 2 INJECTION INTRAMUSCULAR; INTRAVENOUS at 04:32

## 2024-01-18 RX ADMIN — DOCUSATE SODIUM 200 MG: 100 CAPSULE, LIQUID FILLED ORAL at 08:16

## 2024-01-18 NOTE — PLAN OF CARE
Goal Outcome Evaluation:  Plan of Care Reviewed With: patient           Outcome Evaluation: Pt seen for OT eval s/p L RTSA. Pt familiar with post-op instructions 2/2 R shoulder surger in past. Pt able to complete seated pendulums all variations x 10 reps with good tolerance. Pt educated on don/doff of sling/immobilizer as well as button up shirt and LB clothing. Pt able to complete transfers with CGA-SBA without AD. Pt with no further questions/concerns at this time and left with HEP handout. OT will complete orders at this time and recommend d/c to home with prn assist.      Anticipated Discharge Disposition (OT): home with assist

## 2024-01-18 NOTE — DISCHARGE SUMMARY
Date of Admission: 01/17/24  Date of Discharge:  1/18/2024    Admitting Diagnosis: S/P reverse total shoulder arthroplasty, left [Z96.612]  Discharge Diagnosis: Procedure(s):  TOTAL SHOULDER REVERSE ARTHROPLASTY    Hospital Course  Patient is a 73 y.o. male presented with S/P reverse total shoulder arthroplasty, left [Z96.612]. The patient did well postoperatively. At the time of discharge, the patient was afebrile, tolerating a regular diet, having normal bowel function, and adequate pain control on p.o. pain medication only.  He was instructed in Codman exercises prior to discharge.    Procedures Performed  Procedure(s):  TOTAL SHOULDER REVERSE ARTHROPLASTY        Disposition: Discharge to home today.      Followup: Follow up in a period of 2 weeks.     Discharge Medications: Percocet.     Follow-up Appointments  No future appointments.  Additional Instructions for the Follow-ups that You Need to Schedule       Discharge Follow-up with Specialty: [facility] - Dr. Soliman/Collin Gotti PA-C; 2 Weeks   As directed      Specialty: [facility] - Dr. Soliman/Collin Gotti PA-C   Follow Up: 2 Weeks   Follow Up Details: [phone]                Anoop Soliman MD  01/18/24  07:52 EST

## 2024-01-18 NOTE — PROGRESS NOTES
Orthopedic Progress Note    Subjective     Post-Operative Day: 1 post-left reverse shoulder arthroplasty  Systemic or Specific Complaints: No Complaints, no problems with eating, bowel movements, voiding, or wound drainage and pain is well controlled    Objective     Vital signs in last 24 hours:  Temp:  [97.4 °F (36.3 °C)-97.9 °F (36.6 °C)] 97.6 °F (36.4 °C)  Heart Rate:  [] 89  Resp:  [14-20] 18  BP: (109-156)/() 130/83    General: alert, appears stated age, and cooperative   Neurovascular: Intact   Wound: Wound clean and dry no evidence of infection.   Range of Motion: Limited flexion     Data Review  CBC:  Results from last 7 days   Lab Units 01/18/24  0342 01/11/24  0846   WBC 10*3/mm3  --  5.37   RBC 10*6/mm3  --  4.77   HEMOGLOBIN g/dL 10.2* 12.6*   HEMATOCRIT % 30.7* 39.5   PLATELETS 10*3/mm3  --  238       Assessment & Plan     Status post-left reverse shoulder arthroplasty: Doing well postoperatively.     Pain Relief: some relief    Discharge today, Return to Clinic: 8 days    Activity: out of bed and ambulate     LOS: 1 day     Anoop Soliman MD    Date: 1/18/2024  Time: 07:49 EST

## 2024-01-18 NOTE — CODE DOCUMENTATION
Rapid response called for tachycardia. Primary RN has noted that patient has become progressively more tachycardic through night.  Primary RN already spoke to MD and [facility] already consulted.   EKG already completed. Showing sinus tach with rate of 117. Right bundle noted but is not new according to previous EKG.  Patient has no chest pain. No soa.    Primary RN to update [facility].

## 2024-01-18 NOTE — CONSULTS
Patient Name:  Feliciano Noguera  YOB: 1950  MRN:  [id number]  Date of Admission:  1/17/2024  Date of Consult:  1/18/2024  Patient Care Team:  Daniel Mahan MD as PCP - General (Family Medicine)    Inpatient Hospitalist Consult  Consult performed by: Britt Lewis APRN  Consult ordered by: Anoop Soliman MD  Reason for consult: Evaluate status and make recommendations regarding treatment for tachycardia.        Subjective   History of Present Illness  Mr. Noguera is a 73 y.o. male that has been admitted to [facility] following elective TOTAL SHOULDER REVERSE ARTHROPLASTY.  He has been admitted to an orthopedic floor following surgery and we were asked to see and assist with his medical problems, specifically relating to his postoperative tachycardia.  Yesterday evening he developed tachycardia with a heart rate in the 120s.  Tachycardia was not associated with chest pain or tightness, shortness of breath, palpitations, lightheadedness, diaphoresis.  He denies any history of PE or DVT. Patient states he was tachycardic because the temperature in the room was entirely too high and he was very hot.  Once the room cooled off and blankets removed he felt much better.  EKG is unremarkable with sinus tachycardia and known right bundle branch block.  Overnight his heart rate has decreased to the 80s to 90s and he remains asymptomatic.  He has been discharged by orthopedic surgery to home with family.  Patient declines further workup and states he will follow-up with his PCP for further evaluation if needed.  He has tolerated a diet.  He does complain of expected postoperative discomfort.  He reports being in a normal state of health leading up to surgery.       Past Medical History:   Diagnosis Date    Arthritis     Enlarged prostate     GERD (gastroesophageal reflux disease)     [place] (hard of hearing)     DEAF IN RIGHT EAR    Knee pain, left     KNEE REPLACED 11/2023    Left  shoulder pain      Past Surgical History:   Procedure Laterality Date    CHOLECYSTECTOMY WITH INTRAOPERATIVE CHOLANGIOGRAM N/A 8/1/2017    Procedure: CHOLECYSTECTOMY LAPAROSCOPIC INTRAOPERATIVE CHOLANGIOGRAM;  Surgeon: Bola Patel MD;  Location:  [facility];  Service:     COLONOSCOPY N/A 09/24/2009    Dr. Jamison Johnson    COLONOSCOPY N/A 11/14/2018    Procedure: COLONOSCOPY into cecum with biopsy;  Surgeon: Bola Patel MD;  [facility];  Service: General    ENDOSCOPY N/A 12/15/2008    Dr. Jamison Johnson    ENDOSCOPY N/A 11/14/2018    Procedure: ESOPHAGOGASTRODUODENOSCOPY with biopsy;  Surgeon: Bola Patel MD;  [facility];  Service: General    KNEE ARTHROSCOPY Left     PROSTATE BIOPSY      ROTATOR CUFF REPAIR Right 09/29/2009    Dr. Arik Harris    TOTAL KNEE ARTHROPLASTY Right 5/28/2019    Procedure: RIGHT TOTAL KNEE ARTHROPLASTY;  Surgeon: Bola Ramires MD;  Location: [facility] MAIN OR;  Service: Orthopedics    TOTAL KNEE ARTHROPLASTY Left 11/14/2023    Procedure: LEFT TOTAL KNEE ARTHROPLASTY;  Surgeon: Bola Ramires MD;  Location:  [facility];  Service: Orthopedics;  Laterality: Left;     Family History   Problem Relation Age of Onset    Diabetes Mother     Tuberculosis Father     Breast cancer Sister     Bone cancer Sister     Lung cancer Brother     Diabetes Brother     Heart disease Brother     Malig Hyperthermia Neg Hx      Social History     Tobacco Use    Smoking status: Former     Types: Cigars, Cigarettes    Smokeless tobacco: Never    Tobacco comments:     2 CIGARS PER DAY, SOMETIMES LESS QUIT 11/2023     QUIT CIGARETTES SEVERAL YEARS AGO   Vaping Use    Vaping Use: Never used   Substance Use Topics    Alcohol use: Yes     Comment: RARELY    Drug use: No     Medications Prior to Admission   Medication Sig Dispense Refill Last Dose    omeprazole (priLOSEC) 40 MG capsule Take 1 capsule by mouth Daily.   1/17/2024    traMADol (ULTRAM) 50 MG  tablet Take 1 tablet by mouth As Needed (EVERY 4-8 HOURS PRN).   1/16/2024    docusate sodium (COLACE) 250 MG capsule Take 1 capsule by mouth 2 (Two) Times a Day As Needed for Constipation. 30 capsule 1 More than a month    meclizine (ANTIVERT) 25 MG tablet Take 1 tablet by mouth 3 (Three) Times a Day As Needed.   More than a month    tamsulosin (FLOMAX) 0.4 MG capsule 24 hr capsule Take 1 capsule by mouth Every Night.   1/15/2024     Allergies:  Benadryl [diphenhydramine] and Codeine    Review of Systems   Constitutional:  Negative for appetite change and unexpected weight change.   HENT:  Negative for trouble swallowing.    Respiratory:  Negative for cough, choking, chest tightness and shortness of breath.    Cardiovascular:  Negative for chest pain, palpitations and leg swelling.   Gastrointestinal:  Negative for abdominal pain, nausea and vomiting.   Skin:  Negative for color change.   Neurological:  Negative for dizziness.   Hematological:  Does not bruise/bleed easily.   Psychiatric/Behavioral: Negative.         Objective      Vital Signs  Temp:  [97.5 °F (36.4 °C)-97.9 °F (36.6 °C)] 97.6 °F (36.4 °C)  Heart Rate:  [] 89  Resp:  [16-20] 18  BP: (109-156)/(69-90) 130/83  Body mass index is 26.45 kg/m².    Physical Exam  Vitals and nursing note reviewed.   Constitutional:       General: He is not in acute distress.     Appearance: Normal appearance. He is well-developed. He is not ill-appearing or toxic-appearing.   HENT:      Head: Normocephalic and atraumatic.   Eyes:      Conjunctiva/sclera: Conjunctivae normal.   Neck:      Trachea: No tracheal deviation.   Cardiovascular:      Rate and Rhythm: Normal rate and regular rhythm.   Pulmonary:      Effort: Pulmonary effort is normal. No respiratory distress.      Breath sounds: Normal breath sounds. No wheezing, rhonchi or rales.   Abdominal:      General: Bowel sounds are normal. There is no distension.      Palpations: Abdomen is soft.      Tenderness:  There is no abdominal tenderness. There is no guarding.   Musculoskeletal:         General: No tenderness. Normal range of motion.      Cervical back: Normal range of motion.      Right lower leg: No edema.      Left lower leg: No edema.   Skin:     General: Skin is warm and dry.      Coloration: Pallor: /mnac.   Neurological:      General: No focal deficit present.      Mental Status: He is alert and oriented to person, place, and time. Mental status is at baseline.   Psychiatric:         Judgment: Judgment normal.         Results Review:   I reviewed the patient's new clinical results.  I reviewed the patient's new imaging results and agree with the interpretation.  I reviewed the patient's other test results and agree with the interpretation         Assessment/Plan     Active Hospital Problems    Diagnosis  POA    **S/P reverse total shoulder arthroplasty, left [Z96.612]  Not Applicable      Resolved Hospital Problems    Diagnosis Date Resolved POA    Sinus tachycardia [R00.0] 01/18/2024 Yes       Mr. Noguera is a 73 y.o. male who is s/p TOTAL SHOULDER REVERSE ARTHROPLASTY.    He seems to be doing well thus far postoperatively.   [facility] was consulted for tachycardia which has resolved. Suspect tachycardia secondary to postoperative discomfort/room temperature changes as reported by patient.  Tachycardia now resolved. RBBB on EKG is not new. Follow up with PCP. No further workup. Ok for DC from medicine standpoint.         Thank you very much for asking [facility] to be involved in this patient's care. We will follow along with you.      DEBRA Fuentes  [facility]  01/18/24  11:59 EST

## 2024-01-18 NOTE — PLAN OF CARE
Problem: Adult Inpatient Plan of Care  Goal: Plan of Care Review  Outcome: Adequate for Care Transition  Goal: Patient-Specific Goal (Individualized)  Outcome: Adequate for Care Transition  Goal: Absence of Hospital-Acquired Illness or Injury  Outcome: Adequate for Care Transition  Intervention: Identify and Manage Fall Risk  Recent Flowsheet Documentation  Taken 1/18/2024 1000 by Lacey Red RN  Safety Promotion/Fall Prevention:   activity supervised   assistive device/personal items within reach   clutter free environment maintained   fall prevention program maintained   nonskid shoes/slippers when out of bed   room organization consistent   safety round/check completed  Taken 1/18/2024 0816 by Lacey Red RN  Safety Promotion/Fall Prevention:   activity supervised   assistive device/personal items within reach   clutter free environment maintained   fall prevention program maintained   nonskid shoes/slippers when out of bed   room organization consistent   safety round/check completed  Intervention: Prevent Skin Injury  Recent Flowsheet Documentation  Taken 1/18/2024 1000 by Lacey Red RN  Body Position:   supine   position changed independently  Taken 1/18/2024 0816 by Lacey Red RN  Body Position: supine  Intervention: Prevent and Manage VTE (Venous Thromboembolism) Risk  Recent Flowsheet Documentation  Taken 1/18/2024 0816 by Lacey Red RN  Activity Management: activity encouraged  Goal: Optimal Comfort and Wellbeing  Outcome: Adequate for Care Transition  Goal: Readiness for Transition of Care  Outcome: Adequate for Care Transition     Problem: Adjustment to Surgery (Shoulder Arthroplasty)  Goal: Optimal Coping  Outcome: Adequate for Care Transition     Problem: Bleeding (Shoulder Arthroplasty)  Goal: Absence of Bleeding  Outcome: Adequate for Care Transition     Problem: Bowel Motility Impaired (Shoulder Arthroplasty)  Goal: Effective Bowel Elimination  Outcome:  Adequate for Care Transition     Problem: Fluid and Electrolyte Imbalance (Shoulder Arthroplasty)  Goal: Fluid and Electrolyte Balance  Outcome: Adequate for Care Transition     Problem: Functional Ability Impaired (Shoulder Arthroplasty)  Goal: Optimal Functional Ability  Outcome: Adequate for Care Transition  Intervention: Promote Optimal Functional Status  Recent Flowsheet Documentation  Taken 1/18/2024 0816 by Lacey Red RN  Activity Management: activity encouraged     Problem: Infection (Shoulder Arthroplasty)  Goal: Absence of Infection Signs and Symptoms  Outcome: Adequate for Care Transition     Problem: Neurovascular Compromise (Shoulder Arthroplasty)  Goal: Intact Neurovascular Status  Outcome: Adequate for Care Transition     Problem: Ongoing Anesthesia Effects (Shoulder Arthroplasty)  Goal: Anesthesia/Sedation Recovery  Outcome: Adequate for Care Transition  Intervention: Optimize Anesthesia Recovery  Recent Flowsheet Documentation  Taken 1/18/2024 1000 by Lacey Red RN  Safety Promotion/Fall Prevention:   activity supervised   assistive device/personal items within reach   clutter free environment maintained   fall prevention program maintained   nonskid shoes/slippers when out of bed   room organization consistent   safety round/check completed  Taken 1/18/2024 0816 by Lacey Red RN  Safety Promotion/Fall Prevention:   activity supervised   assistive device/personal items within reach   clutter free environment maintained   fall prevention program maintained   nonskid shoes/slippers when out of bed   room organization consistent   safety round/check completed     Problem: Pain (Shoulder Arthroplasty)  Goal: Acceptable Pain Control  Outcome: Adequate for Care Transition     Problem: Postoperative Nausea and Vomiting (Shoulder Arthroplasty)  Goal: Nausea and Vomiting Relief  Outcome: Adequate for Care Transition     Problem: Postoperative Urinary Retention (Shoulder  Arthroplasty)  Goal: Effective Urinary Elimination  Outcome: Adequate for Care Transition   Goal Outcome Evaluation:

## 2024-01-18 NOTE — PLAN OF CARE
Goal Outcome Evaluation:  Plan of Care Reviewed With: patient           Outcome Evaluation: Pt A&OX4. Pt was doing well in the beginning of shift- he ambulated in the room without dificulty and said he was only having moderate pain. At 9 pm pt called saying he was not not feeling well. He was found sweating profusely and his HR was in the 140's. ECG was orderede, attending called, vitals checked. A RRT was also called. ECG showed ST rhythm with BBB (which was not new). Spoke with DEBRA Allen and received oeder to consult A for medical management. Meanwhile pt's HR improved to <120, but still tachy. A called , spoke with ESTEFANY Hooks and informed her of the event; no new orders received. Over night I observed that pt became tachycardic with movement/ activity (HR>120).

## 2024-01-18 NOTE — THERAPY DISCHARGE NOTE
Acute Care - Occupational Therapy Discharge  [facility]    Patient Name: Feliciano Noguera  : 1950    MRN: [id number]                              Today's Date: 2024       Admit Date: 2024    Visit Dx:     ICD-10-CM ICD-9-CM   1. S/P reverse total shoulder arthroplasty, left  Z96.612 V43.61     Patient Active Problem List   Diagnosis    Precordial pain    Fatigue    Snoring    Uncontrolled daytime somnolence    Benign prostatic hyperplasia without lower urinary tract symptoms    Tobacco abuse    Right upper quadrant abdominal pain    Hematochezia    Epigastric pain    Diarrhea    OA (osteoarthritis) of knee    Facial numbness    Paresthesias    Myelopathy    Primary osteoarthritis of left knee    S/P reverse total shoulder arthroplasty, left     Past Medical History:   Diagnosis Date    Arthritis     Enlarged prostate     GERD (gastroesophageal reflux disease)     [place] (hard of hearing)     DEAF IN RIGHT EAR    Knee pain, left     KNEE REPLACED 2023    Left shoulder pain      Past Surgical History:   Procedure Laterality Date    CHOLECYSTECTOMY WITH INTRAOPERATIVE CHOLANGIOGRAM N/A 2017    Procedure: CHOLECYSTECTOMY LAPAROSCOPIC INTRAOPERATIVE CHOLANGIOGRAM;  Surgeon: Bola Patel MD;  Location[place] [facility] [place] [facility] [place];  Service:     COLONOSCOPY N/A 2009    Dr. Jamison Johnson    COLONOSCOPY N/A 2018    Procedure: COLONOSCOPY into cecum with biopsy;  Surgeon: Bola Patel MD;  Location: [facility] ENDOSCOPY;  Service: General    ENDOSCOPY N/A 12/15/2008    Dr. Jamison Johnson    ENDOSCOPY N/A 2018    Procedure: ESOPHAGOGASTRODUODENOSCOPY with biopsy;  Surgeon: Bola Patel MD;  Location: [facility] ENDOSCOPY;  Service: General    KNEE ARTHROSCOPY Left     PROSTATE BIOPSY      ROTATOR CUFF REPAIR Right 2009    Dr. Arik Harris    TOTAL KNEE ARTHROPLASTY Right 2019    Procedure: RIGHT TOTAL KNEE ARTHROPLASTY;  Surgeon: Bola Ramires MD;  Location: [facility]  MAIN OR;  Service: Orthopedics    TOTAL KNEE ARTHROPLASTY Left 11/14/2023    Procedure: LEFT TOTAL KNEE ARTHROPLASTY;  Surgeon: Bola Ramires MD;  Location: [facility] OR [facility] [place];  Service: Orthopedics;  Laterality: Left;      General Information       Row Name 01/18/24 0924          OT Time and Intention    Document Type discharge evaluation/summary  -CE     Mode of Treatment occupational therapy  -CE       Row Name 01/18/24 0924          General Information    Patient Profile Reviewed yes  -CE     Prior Level of Function independent:;ADL's;transfer;community mobility  -CE     Existing Precautions/Restrictions non-weight bearing;shoulder;left  s/p L RTSA, sling AAT  -CE     Barriers to Rehab none identified  -CE       Row Name 01/18/24 0924          Living Environment    People in Home spouse  -CE       Row Name 01/18/24 0924          Home Main Entrance    Number of Stairs, Main Entrance none  -CE       Row Name 01/18/24 0924          Stairs Within Home, Primary    Number of Stairs, Within Home, Primary none  -CE       Row Name 01/18/24 0924          Cognition    Orientation Status (Cognition) oriented x 4  -CE       Row Name 01/18/24 0924          Safety Issues, Functional Mobility    Impairments Affecting Function (Mobility) endurance/activity tolerance;pain  -CE               User Key  (r) = Recorded By, (t) = Taken By, (c) = Cosigned By      Initials Name Provider Type    CE Lucinda Salazar OT Occupational Therapist                   Mobility/ADL's       Row Name 01/18/24 0925          Bed Mobility    Bed Mobility supine-sit  -CE     Supine-Sit [place] (Bed Mobility) 1 person assist;minimum assist (75% patient effort)  -CE     Assistive Device (Bed Mobility) head of bed elevated  -CE       Row Name 01/18/24 0925          Transfers    Transfers sit-stand transfer;bed-chair transfer  -CE       Row Name 01/18/24 0925          Bed-Chair Transfer    Bed-Chair [place] (Transfers) 1 person assist;contact  guard  -CE     Comment, (Bed-Chair Transfer) no AD  -CE       Row Name 01/18/24 0925          Sit-Stand Transfer    Sit-Stand [place] (Transfers) standby assist  -CE       Row Name 01/18/24 0925          Functional Mobility    Functional Mobility- Comment Pt able to take a few step from EOB>chair with CGA and no AD; no LOB noted  -CE       Row Name 01/18/24 0925          Activities of Daily Living    BADL Assessment/Intervention upper body dressing;lower body dressing  -CE       Row Name 01/18/24 0925          Mobility    Extremity Weight-bearing Status left upper extremity  -CE     Left Upper Extremity (Weight-bearing Status) non weight-bearing (NWB);other (see comments)  s/p RTSA; donjoy sling/immobilizer at all times except exercise and hygiene  -CE       Row Name 01/18/24 0925          Upper Body Dressing Assessment/Training    [place] Level (Upper Body Dressing) don;doff;front opening garment;maximum assist (25% patient effort);moderate assist (50% patient effort);verbal cues  and sling  -CE     Position (Upper Body Dressing) edge of bed sitting  -CE     Comment, (Upper Body Dressing) cues for technique to maintain shoulder precautions  -CE       Row Name 01/18/24 0925          Lower Body Dressing Assessment/Training    [place] Level (Lower Body Dressing) don;pants/bottoms;minimum assist (75% patient effort)  -CE               User Key  (r) = Recorded By, (t) = Taken By, (c) = Cosigned By      Initials Name Provider Type    Lucinda Mendes OT Occupational Therapist                   Obj/Interventions       Row Name 01/18/24 0927          Sensory Assessment (Somatosensory)    Sensory Assessment (Somatosensory) sensation intact  -     Sensory Assessment pt reporting mild tingling in tips of fingers, but otherwise intact  -CE       Row Name 01/18/24 0927          Vision Assessment/Intervention    Visual Impairment/Limitations WFL;corrective lenses for distance;corrective lenses for reading   -CE       Row Name 01/18/24 0927          Strength Comprehensive (MMT)    Comment, General Manual Muscle Testing (MMT) Assessment RUE >/= 3+/5  -CE       Row Name 01/18/24 0927          Balance    Balance Assessment sitting dynamic balance  -CE     Dynamic Sitting Balance supervision  -CE     Position, Sitting Balance unsupported  -CE               User Key  (r) = Recorded By, (t) = Taken By, (c) = Cosigned By      Initials Name Provider Type    CE Lucinda Salazar OT Occupational Therapist                   Goals/Plan       Row Name 01/18/24 0930          Problem Specific Goal 1 (OT)    Problem Specific Goal 1 (OT) Pt will independently demonstrate understanding of UE HEP, pendulumes 5-6x/day x 10 reps each variation.  -CE     Time Frame (Problem Specific Goal 1, OT) short term goal (STG);1 day  -CE     Progress/Outcome (Problem Specific Goal 1, OT) goal met  -CE               User Key  (r) = Recorded By, (t) = Taken By, (c) = Cosigned By      Initials Name Provider Type    Lucinda Mendes OT Occupational Therapist                   Clinical Impression       Row Name 01/18/24 0928          Pain Assessment    Pretreatment Pain Rating 5/10  -CE     Posttreatment Pain Rating 5/10  -CE     Pain Location - Side/Orientation Left  -CE     Pain Location - shoulder  -CE     Pain Intervention(s) Rest;Repositioned;Cold applied  -CE       Row Name 01/18/24 0928          Plan of Care Review    Plan of Care Reviewed With patient  -CE     Outcome Evaluation Pt seen for OT eval s/p L RTSA. Pt familiar with post-op instructions 2/2 R shoulder surger in past. Pt able to complete seated pendulums all variations x 10 reps with good tolerance. Pt educated on don/doff of sling/immobilizer as well as button up shirt and LB clothing. Pt able to complete transfers with CGA-SBA without AD. Pt with no further questions/concerns at this time and left with HEP handout. OT will complete orders at this time and recommend d/c to home with  prn assist.  -CE       Row Name 01/18/24 0928          Therapy Plan Review/Discharge Plan (OT)    Anticipated Discharge Disposition (OT) home with assist  -CE       Row Name 01/18/24 0928          Vital Signs    O2 Delivery Pre Treatment room air  -CE     O2 Delivery Intra Treatment room air  -CE     O2 Delivery Post Treatment room air  -CE     Pre Patient Position Supine  -CE     Intra Patient Position Standing  -CE     Post Patient Position Sitting  -CE       Row Name 01/18/24 0928          Positioning and Restraints    Pre-Treatment Position in bed  -CE     Post Treatment Position chair  -CE     In Chair notified nsg;reclined;call light within reach;encouraged to call for assist;exit alarm on  -CE               User Key  (r) = Recorded By, (t) = Taken By, (c) = Cosigned By      Initials Name Provider Type    Lucinda Mendes OT Occupational Therapist                   Outcome Measures       Row Name 01/18/24 0932          How much help from another is currently needed...    Putting on and taking off regular lower body clothing? 3  -CE     Bathing (including washing, rinsing, and drying) 3  -CE     Toileting (which includes using toilet bed pan or urinal) 3  -CE     Putting on and taking off regular upper body clothing 2  -CE     Taking care of personal grooming (such as brushing teeth) 3  -CE     Eating meals 4  -CE     AM-PAC 6 Clicks Score (OT) 18  -CE       Row Name 01/18/24 0932          Functional Assessment    Outcome Measure Options AM-PAC 6 Clicks Daily Activity (OT)  -CE               User Key  (r) = Recorded By, (t) = Taken By, (c) = Cosigned By      Initials Name Provider Type    Lucinda Mendes OT Occupational Therapist                  Occupational Therapy Education       Title: PT OT SLP Therapies (Done)       Topic: Occupational Therapy (Done)       Point: ADL training (Done)       Description:   Instruct learner(s) on proper safety adaptation and remediation techniques during self care  or transfers.   Instruct in proper use of assistive devices.                  Learning Progress Summary             Patient Acceptance, E, VU,DU by CE at 1/18/2024 0932                         Point: Home exercise program (Done)       Description:   Instruct learner(s) on appropriate technique for monitoring, assisting and/or progressing therapeutic exercises/activities.                  Learning Progress Summary             Patient Acceptance, E, VU,DU by CE at 1/18/2024 0932                         Point: Precautions (Done)       Description:   Instruct learner(s) on prescribed precautions during self-care and functional transfers.                  Learning Progress Summary             Patient Acceptance, E, VU,DU by CE at 1/18/2024 0932                         Point: Body mechanics (Done)       Description:   Instruct learner(s) on proper positioning and spine alignment during self-care, functional mobility activities and/or exercises.                  Learning Progress Summary             Patient Acceptance, E, VU,DU by CE at 1/18/2024 0932                                         User Key       Initials Effective Dates Name Provider Type Discipline    CE 10/17/22 -  Lucinda Salazar OT Occupational Therapist OT                  OT Recommendation and Plan     Plan of Care Review  Plan of Care Reviewed With: patient  Outcome Evaluation: Pt seen for OT eval s/p L RTSA. Pt familiar with post-op instructions 2/2 R shoulder surger in past. Pt able to complete seated pendulums all variations x 10 reps with good tolerance. Pt educated on don/doff of sling/immobilizer as well as button up shirt and LB clothing. Pt able to complete transfers with CGA-SBA without AD. Pt with no further questions/concerns at this time and left with HEP handout. OT will complete orders at this time and recommend d/c to home with prn assist.  Plan of Care Reviewed With: patient  Outcome Evaluation: Pt seen for OT eval s/p L RTSA. Pt  familiar with post-op instructions 2/2 R shoulder surger in past. Pt able to complete seated pendulums all variations x 10 reps with good tolerance. Pt educated on don/doff of sling/immobilizer as well as button up shirt and LB clothing. Pt able to complete transfers with CGA-SBA without AD. Pt with no further questions/concerns at this time and left with HEP handout. OT will complete orders at this time and recommend d/c to home with prn assist.     Time Calculation:   Evaluation Complexity (OT)  Review Occupational Profile/Medical/Therapy History Complexity: expanded/moderate complexity  Assessment, Occupational Performance/Identification of Deficit Complexity: 3-5 performance deficits  Overall Complexity of Evaluation (OT): moderate complexity     Time Calculation- OT       Row Name 01/18/24 0933             Time Calculation- OT    OT Start Time 0827  -CE      OT Stop Time 0850  -CE      OT Time Calculation (min) 23 min  -CE      Total Timed Code Minutes- OT 15 minute(s)  -CE      OT Received On 01/18/24  -CE         Timed Charges    04864 - OT Self Care/Mgmt Minutes 15  -CE         Total Minutes    Timed Charges Total Minutes 15  -CE       Total Minutes 15  -CE                User Key  (r) = Recorded By, (t) = Taken By, (c) = Cosigned By      Initials Name Provider Type    CE Lucinda Salazar OT Occupational Therapist                  Therapy Charges for Today       Code Description Service Date Service Provider Modifiers Qty    [id number]  OT SELF CARE/MGMT/TRAIN EA 15 MIN 1/18/2024 Lucinda Salazar OT GO 1    [id number]  OT EVAL MOD COMPLEXITY 2 1/18/2024 Lucinda Salazar OT GO 1               OT Discharge Summary  Anticipated Discharge Disposition (OT): home with assist    Lucinda Salazar OT  1/18/2024

## 2024-01-19 NOTE — CASE MANAGEMENT/SOCIAL WORK
Case Management Discharge Note      Final Note: dc home         Selected Continued Care - Discharged on 1/18/2024 Admission date: 1/17/2024 - Discharge disposition: Home or Self Care      Destination    No services have been selected for the patient.                Durable Medical Equipment    No services have been selected for the patient.                Dialysis/Infusion    No services have been selected for the patient.                Home Medical Care    No services have been selected for the patient.                Therapy    No services have been selected for the patient.                Community Resources    No services have been selected for the patient.                Community & DME    No services have been selected for the patient.                    Selected Continued Care - Prior Encounters Includes continued care and service providers with selected services from prior encounters from 10/19/2023 to 1/18/2024      Discharged on 11/15/2023 Admission date: 11/14/2023 - Discharge disposition: Home or Self Care      Home Medical Care       Service Provider Selected Services Address Phone Fax Patient Preferred    [facility] [address] [place] [phone] --                          Transportation Services  Private: Car    Final Discharge Disposition Code: 01 - home or self-care

## 2024-02-06 ENCOUNTER — TRANSCRIBE ORDERS (OUTPATIENT)
Dept: LAB | Facility: HOSPITAL | Age: 74
End: 2024-02-06
Payer: MEDICARE

## 2024-02-06 ENCOUNTER — LAB (OUTPATIENT)
Dept: LAB | Facility: HOSPITAL | Age: 74
End: 2024-02-06
Payer: MEDICARE

## 2024-02-06 DIAGNOSIS — Z96.612 STATUS POST TOTAL SHOULDER ARTHROPLASTY, LEFT: Primary | ICD-10-CM

## 2024-02-06 DIAGNOSIS — Z96.612 STATUS POST TOTAL SHOULDER ARTHROPLASTY, LEFT: ICD-10-CM

## 2024-02-06 LAB
ALBUMIN SERPL-MCNC: 3.9 G/DL (ref 3.5–5.2)
ALBUMIN/GLOB SERPL: 1.3 G/DL
ALP SERPL-CCNC: 107 U/L (ref 39–117)
ALT SERPL W P-5'-P-CCNC: 10 U/L (ref 1–41)
ANION GAP SERPL CALCULATED.3IONS-SCNC: 8.6 MMOL/L (ref 5–15)
AST SERPL-CCNC: 13 U/L (ref 1–40)
BASOPHILS # BLD AUTO: 0.06 10*3/MM3 (ref 0–0.2)
BASOPHILS NFR BLD AUTO: 1 % (ref 0–1.5)
BILIRUB SERPL-MCNC: 0.4 MG/DL (ref 0–1.2)
BUN SERPL-MCNC: 12 MG/DL (ref 8–23)
BUN/CREAT SERPL: 13.6 (ref 7–25)
CALCIUM SPEC-SCNC: 9.2 MG/DL (ref 8.6–10.5)
CHLORIDE SERPL-SCNC: 102 MMOL/L (ref 98–107)
CO2 SERPL-SCNC: 28.4 MMOL/L (ref 22–29)
CREAT SERPL-MCNC: 0.88 MG/DL (ref 0.76–1.27)
DEPRECATED RDW RBC AUTO: 36.8 FL (ref 37–54)
EGFRCR SERPLBLD CKD-EPI 2021: 89.7 ML/MIN/1.73
EOSINOPHIL # BLD AUTO: 0.11 10*3/MM3 (ref 0–0.4)
EOSINOPHIL NFR BLD AUTO: 1.8 % (ref 0.3–6.2)
ERYTHROCYTE [DISTWIDTH] IN BLOOD BY AUTOMATED COUNT: 12.9 % (ref 12.3–15.4)
GLOBULIN UR ELPH-MCNC: 2.9 GM/DL
GLUCOSE SERPL-MCNC: 88 MG/DL (ref 65–99)
HCT VFR BLD AUTO: 34.3 % (ref 37.5–51)
HGB BLD-MCNC: 10.9 G/DL (ref 13–17.7)
IMM GRANULOCYTES # BLD AUTO: 0.01 10*3/MM3 (ref 0–0.05)
IMM GRANULOCYTES NFR BLD AUTO: 0.2 % (ref 0–0.5)
LYMPHOCYTES # BLD AUTO: 1.09 10*3/MM3 (ref 0.7–3.1)
LYMPHOCYTES NFR BLD AUTO: 17.6 % (ref 19.6–45.3)
MCH RBC QN AUTO: 25.4 PG (ref 26.6–33)
MCHC RBC AUTO-ENTMCNC: 31.8 G/DL (ref 31.5–35.7)
MCV RBC AUTO: 80 FL (ref 79–97)
MONOCYTES # BLD AUTO: 0.49 10*3/MM3 (ref 0.1–0.9)
MONOCYTES NFR BLD AUTO: 7.9 % (ref 5–12)
NEUTROPHILS NFR BLD AUTO: 4.45 10*3/MM3 (ref 1.7–7)
NEUTROPHILS NFR BLD AUTO: 71.5 % (ref 42.7–76)
NRBC BLD AUTO-RTO: 0 /100 WBC (ref 0–0.2)
PLATELET # BLD AUTO: 356 10*3/MM3 (ref 140–450)
PMV BLD AUTO: 9.5 FL (ref 6–12)
POTASSIUM SERPL-SCNC: 3.9 MMOL/L (ref 3.5–5.2)
PROT SERPL-MCNC: 6.8 G/DL (ref 6–8.5)
RBC # BLD AUTO: 4.29 10*6/MM3 (ref 4.14–5.8)
SODIUM SERPL-SCNC: 139 MMOL/L (ref 136–145)
WBC NRBC COR # BLD AUTO: 6.21 10*3/MM3 (ref 3.4–10.8)

## 2024-02-06 PROCEDURE — 85025 COMPLETE CBC W/AUTO DIFF WBC: CPT

## 2024-02-06 PROCEDURE — 80053 COMPREHEN METABOLIC PANEL: CPT

## 2024-02-06 PROCEDURE — 36415 COLL VENOUS BLD VENIPUNCTURE: CPT

## 2024-02-07 ENCOUNTER — APPOINTMENT (OUTPATIENT)
Dept: GENERAL RADIOLOGY | Facility: HOSPITAL | Age: 74
End: 2024-02-07
Payer: MEDICARE

## 2024-02-07 ENCOUNTER — ANESTHESIA EVENT (OUTPATIENT)
Dept: PERIOP | Facility: HOSPITAL | Age: 74
End: 2024-02-07
Payer: MEDICARE

## 2024-02-07 ENCOUNTER — HOSPITAL ENCOUNTER (OUTPATIENT)
Facility: HOSPITAL | Age: 74
Setting detail: HOSPITAL OUTPATIENT SURGERY
Discharge: HOME | End: 2024-02-07
Attending: ORTHOPAEDIC SURGERY | Admitting: ORTHOPAEDIC SURGERY
Payer: MEDICARE

## 2024-02-07 ENCOUNTER — ANESTHESIA (OUTPATIENT)
Dept: PERIOP | Facility: HOSPITAL | Age: 74
End: 2024-02-07
Payer: MEDICARE

## 2024-02-07 VITALS
OXYGEN SATURATION: 99 % | SYSTOLIC BLOOD PRESSURE: 165 MMHG | TEMPERATURE: 97.6 F | HEART RATE: 87 BPM | RESPIRATION RATE: 16 BRPM | DIASTOLIC BLOOD PRESSURE: 104 MMHG

## 2024-02-07 PROCEDURE — 25810000003 LACTATED RINGERS PER 1000 ML: Performed by: ANESTHESIOLOGY

## 2024-02-07 PROCEDURE — 25010000002 PROPOFOL 200 MG/20ML EMULSION: Performed by: REGISTERED NURSE

## 2024-02-07 PROCEDURE — 76000 FLUOROSCOPY <1 HR PHYS/QHP: CPT

## 2024-02-07 RX ORDER — ONDANSETRON 2 MG/ML
4 INJECTION INTRAMUSCULAR; INTRAVENOUS ONCE AS NEEDED
Status: DISCONTINUED | OUTPATIENT
Start: 2024-02-07 | End: 2024-02-07 | Stop reason: HOSPADM

## 2024-02-07 RX ORDER — HYDROCODONE BITARTRATE AND ACETAMINOPHEN 7.5; 325 MG/1; MG/1
1 TABLET ORAL EVERY 4 HOURS PRN
Status: DISCONTINUED | OUTPATIENT
Start: 2024-02-07 | End: 2024-02-07 | Stop reason: HOSPADM

## 2024-02-07 RX ORDER — HYDRALAZINE HYDROCHLORIDE 20 MG/ML
5 INJECTION INTRAMUSCULAR; INTRAVENOUS
Status: DISCONTINUED | OUTPATIENT
Start: 2024-02-07 | End: 2024-02-07 | Stop reason: HOSPADM

## 2024-02-07 RX ORDER — PROMETHAZINE HYDROCHLORIDE 25 MG/1
25 SUPPOSITORY RECTAL ONCE AS NEEDED
Status: DISCONTINUED | OUTPATIENT
Start: 2024-02-07 | End: 2024-02-07 | Stop reason: HOSPADM

## 2024-02-07 RX ORDER — PROPOFOL 10 MG/ML
INJECTION, EMULSION INTRAVENOUS AS NEEDED
Status: DISCONTINUED | OUTPATIENT
Start: 2024-02-07 | End: 2024-02-07 | Stop reason: SURG

## 2024-02-07 RX ORDER — NALOXONE HCL 0.4 MG/ML
0.2 VIAL (ML) INJECTION AS NEEDED
Status: DISCONTINUED | OUTPATIENT
Start: 2024-02-07 | End: 2024-02-07 | Stop reason: HOSPADM

## 2024-02-07 RX ORDER — MIDAZOLAM HYDROCHLORIDE 1 MG/ML
0.5 INJECTION INTRAMUSCULAR; INTRAVENOUS
Status: DISCONTINUED | OUTPATIENT
Start: 2024-02-07 | End: 2024-02-07 | Stop reason: SDUPTHER

## 2024-02-07 RX ORDER — SUCCINYLCHOLINE/SOD CL,ISO/PF 200MG/10ML
SYRINGE (ML) INTRAVENOUS AS NEEDED
Status: DISCONTINUED | OUTPATIENT
Start: 2024-02-07 | End: 2024-02-07 | Stop reason: SURG

## 2024-02-07 RX ORDER — FAMOTIDINE 10 MG/ML
20 INJECTION, SOLUTION INTRAVENOUS ONCE
Status: DISCONTINUED | OUTPATIENT
Start: 2024-02-07 | End: 2024-02-07 | Stop reason: SDUPTHER

## 2024-02-07 RX ORDER — LABETALOL HYDROCHLORIDE 5 MG/ML
5 INJECTION, SOLUTION INTRAVENOUS
Status: DISCONTINUED | OUTPATIENT
Start: 2024-02-07 | End: 2024-02-07 | Stop reason: HOSPADM

## 2024-02-07 RX ORDER — SODIUM CHLORIDE, SODIUM LACTATE, POTASSIUM CHLORIDE, CALCIUM CHLORIDE 600; 310; 30; 20 MG/100ML; MG/100ML; MG/100ML; MG/100ML
9 INJECTION, SOLUTION INTRAVENOUS CONTINUOUS
Status: DISCONTINUED | OUTPATIENT
Start: 2024-02-07 | End: 2024-02-07 | Stop reason: SDUPTHER

## 2024-02-07 RX ORDER — DROPERIDOL 2.5 MG/ML
0.62 INJECTION, SOLUTION INTRAMUSCULAR; INTRAVENOUS
Status: DISCONTINUED | OUTPATIENT
Start: 2024-02-07 | End: 2024-02-07 | Stop reason: HOSPADM

## 2024-02-07 RX ORDER — HYDROCODONE BITARTRATE AND ACETAMINOPHEN 5; 325 MG/1; MG/1
1 TABLET ORAL ONCE AS NEEDED
Status: DISCONTINUED | OUTPATIENT
Start: 2024-02-07 | End: 2024-02-07 | Stop reason: HOSPADM

## 2024-02-07 RX ORDER — SODIUM CHLORIDE 0.9 % (FLUSH) 0.9 %
3 SYRINGE (ML) INJECTION EVERY 12 HOURS SCHEDULED
Status: DISCONTINUED | OUTPATIENT
Start: 2024-02-07 | End: 2024-02-07 | Stop reason: HOSPADM

## 2024-02-07 RX ORDER — FAMOTIDINE 10 MG/ML
20 INJECTION, SOLUTION INTRAVENOUS ONCE
Status: COMPLETED | OUTPATIENT
Start: 2024-02-07 | End: 2024-02-07

## 2024-02-07 RX ORDER — IPRATROPIUM BROMIDE AND ALBUTEROL SULFATE 2.5; .5 MG/3ML; MG/3ML
3 SOLUTION RESPIRATORY (INHALATION) ONCE AS NEEDED
Status: DISCONTINUED | OUTPATIENT
Start: 2024-02-07 | End: 2024-02-07 | Stop reason: HOSPADM

## 2024-02-07 RX ORDER — ROCURONIUM BROMIDE 10 MG/ML
INJECTION, SOLUTION INTRAVENOUS AS NEEDED
Status: DISCONTINUED | OUTPATIENT
Start: 2024-02-07 | End: 2024-02-07 | Stop reason: SURG

## 2024-02-07 RX ORDER — SODIUM CHLORIDE, SODIUM LACTATE, POTASSIUM CHLORIDE, CALCIUM CHLORIDE 600; 310; 30; 20 MG/100ML; MG/100ML; MG/100ML; MG/100ML
9 INJECTION, SOLUTION INTRAVENOUS CONTINUOUS
Status: DISCONTINUED | OUTPATIENT
Start: 2024-02-07 | End: 2024-02-07 | Stop reason: HOSPADM

## 2024-02-07 RX ORDER — MIDAZOLAM HYDROCHLORIDE 1 MG/ML
0.5 INJECTION INTRAMUSCULAR; INTRAVENOUS
Status: DISCONTINUED | OUTPATIENT
Start: 2024-02-07 | End: 2024-02-07 | Stop reason: HOSPADM

## 2024-02-07 RX ORDER — LIDOCAINE HYDROCHLORIDE 10 MG/ML
0.5 INJECTION, SOLUTION INFILTRATION; PERINEURAL ONCE AS NEEDED
Status: DISCONTINUED | OUTPATIENT
Start: 2024-02-07 | End: 2024-02-07 | Stop reason: HOSPADM

## 2024-02-07 RX ORDER — PROMETHAZINE HYDROCHLORIDE 25 MG/1
25 TABLET ORAL ONCE AS NEEDED
Status: DISCONTINUED | OUTPATIENT
Start: 2024-02-07 | End: 2024-02-07 | Stop reason: HOSPADM

## 2024-02-07 RX ORDER — FENTANYL CITRATE 50 UG/ML
50 INJECTION, SOLUTION INTRAMUSCULAR; INTRAVENOUS ONCE AS NEEDED
Status: DISCONTINUED | OUTPATIENT
Start: 2024-02-07 | End: 2024-02-07 | Stop reason: SDUPTHER

## 2024-02-07 RX ORDER — LIDOCAINE HYDROCHLORIDE 20 MG/ML
INJECTION, SOLUTION EPIDURAL; INFILTRATION; INTRACAUDAL; PERINEURAL AS NEEDED
Status: DISCONTINUED | OUTPATIENT
Start: 2024-02-07 | End: 2024-02-07 | Stop reason: SURG

## 2024-02-07 RX ORDER — SODIUM CHLORIDE 0.9 % (FLUSH) 0.9 %
3-10 SYRINGE (ML) INJECTION AS NEEDED
Status: DISCONTINUED | OUTPATIENT
Start: 2024-02-07 | End: 2024-02-07 | Stop reason: HOSPADM

## 2024-02-07 RX ORDER — FENTANYL CITRATE 50 UG/ML
25 INJECTION, SOLUTION INTRAMUSCULAR; INTRAVENOUS
Status: DISCONTINUED | OUTPATIENT
Start: 2024-02-07 | End: 2024-02-07 | Stop reason: HOSPADM

## 2024-02-07 RX ORDER — FLUMAZENIL 0.1 MG/ML
0.2 INJECTION INTRAVENOUS AS NEEDED
Status: DISCONTINUED | OUTPATIENT
Start: 2024-02-07 | End: 2024-02-07 | Stop reason: HOSPADM

## 2024-02-07 RX ORDER — HYDROMORPHONE HYDROCHLORIDE 1 MG/ML
0.25 INJECTION, SOLUTION INTRAMUSCULAR; INTRAVENOUS; SUBCUTANEOUS
Status: DISCONTINUED | OUTPATIENT
Start: 2024-02-07 | End: 2024-02-07 | Stop reason: HOSPADM

## 2024-02-07 RX ORDER — FENTANYL CITRATE 50 UG/ML
50 INJECTION, SOLUTION INTRAMUSCULAR; INTRAVENOUS ONCE AS NEEDED
Status: DISCONTINUED | OUTPATIENT
Start: 2024-02-07 | End: 2024-02-07 | Stop reason: HOSPADM

## 2024-02-07 RX ORDER — LIDOCAINE HYDROCHLORIDE 10 MG/ML
0.5 INJECTION, SOLUTION INFILTRATION; PERINEURAL ONCE AS NEEDED
Status: DISCONTINUED | OUTPATIENT
Start: 2024-02-07 | End: 2024-02-07 | Stop reason: SDUPTHER

## 2024-02-07 RX ORDER — EPHEDRINE SULFATE 50 MG/ML
5 INJECTION, SOLUTION INTRAVENOUS ONCE AS NEEDED
Status: DISCONTINUED | OUTPATIENT
Start: 2024-02-07 | End: 2024-02-07 | Stop reason: HOSPADM

## 2024-02-07 RX ADMIN — PROPOFOL 100 MG: 10 INJECTION, EMULSION INTRAVENOUS at 14:57

## 2024-02-07 RX ADMIN — SODIUM CHLORIDE, POTASSIUM CHLORIDE, SODIUM LACTATE AND CALCIUM CHLORIDE 9 ML/HR: 600; 310; 30; 20 INJECTION, SOLUTION INTRAVENOUS at 13:43

## 2024-02-07 RX ADMIN — Medication 200 MG: at 15:13

## 2024-02-07 RX ADMIN — LIDOCAINE HYDROCHLORIDE 50 MG: 20 INJECTION, SOLUTION EPIDURAL; INFILTRATION; INTRACAUDAL; PERINEURAL at 14:57

## 2024-02-07 RX ADMIN — FAMOTIDINE 20 MG: 10 INJECTION INTRAVENOUS at 13:42

## 2024-02-07 RX ADMIN — ROCURONIUM BROMIDE 40 MG: 10 INJECTION, SOLUTION INTRAVENOUS at 14:58

## 2024-02-07 NOTE — ANESTHESIA POSTPROCEDURE EVALUATION
Patient: Feliciano Noguera    Procedure Summary       Date: 02/07/24 Room / Location:  [facility] /  [facility]    Anesthesia Start: 1449 Anesthesia Stop: 1526    Procedure: CLOSED REDUCTION INTERNAL FIXATION LEFT REVERSE SHOULDER ARTHROPLASTY (Left: Shoulder) Diagnosis:     Surgeons: Anoop Soliman MD Provider: Aries Novak MD    Anesthesia Type: general ASA Status: 2            Anesthesia Type: general    Vitals  Vitals Value Taken Time   /92 02/07/24 1530   Temp 36.4 °C (97.6 °F) 02/07/24 1523   Pulse 84 02/07/24 1538   Resp 18 02/07/24 1523   SpO2 100 % 02/07/24 1538   Vitals shown include unfiled device data.        Post Anesthesia Care and Evaluation    Patient location during evaluation: bedside  Patient participation: complete - patient participated  Level of consciousness: awake and alert  Pain management: adequate    Airway patency: patent  Anesthetic complications: No anesthetic complications  PONV Status: controlled  Cardiovascular status: blood pressure returned to baseline and acceptable  Respiratory status: acceptable  Hydration status: acceptable

## 2024-02-07 NOTE — SIGNIFICANT NOTE
Patient states coldness and numbness in left hand is improving. Radial pulse, capillary refill are both normal. Patient can moved fingers and feel me touch is hand.

## 2024-02-07 NOTE — SIGNIFICANT NOTE
Patient states hand feels numb and cold. Upon assessment hand is warm, with good capillary refill, patient can moderately squeeze my fingers. Sling adjusted for better fit. Call placed to Dr. Soliman both on his cell and office, messages left. Will continue to monitor.

## 2024-02-07 NOTE — OP NOTE
SHOULDER CLOSED REDUCTION  Procedure Note    Feliciano Noguera  2/7/2024    Pre-op Diagnosis:   1.  Dislocated left reverse total shoulder  2.   3.     Post-op Diagnosis:     1.  Same  2.   3.     Procedure(s):  1.  Closed reduction of dislocated left reverse total shoulder under anesthesia  2.   3.   4.     Surgeon(s):  Anoop Soliman MD    Anesthesia: General  Anesthesiologist: Aries Novak MD  CRNA: Jass Ingram CRNA    Staff:   Circulator: Car Hall RN; Sean Hamilton RN  Radiology Technologist: Mckenzie Reich  Scrub Person: Yadira Adams      Drains: None    Estimated Blood Loss: minimal    Specimen: * No orders in the log *    Description of Procedure: Following induction of anesthesia that included a muscle relaxer I performed examination of the left shoulder.  It did appear that the humeral component was dislocated anterior to the glenosphere.  I performed a gentle reduction maneuver with inline traction and internal/external rotation while holding lateral pressure on the humerus.  There is a reduction moment in the range of motion and appearance improved.  We then used the C arm to verify that the glenohumeral joint was well reduced.  I then observed it under fluoroscopy and placed through a range of motion and it appeared stable.  We then placed him into an abduction pillow sling and he was discharged to recovery in good condition.    Findings: See Dictation    Complications: None      Anoop Soliman MD     Date: 2/7/2024  Time: 15:13 EST

## 2024-02-07 NOTE — ANESTHESIA PREPROCEDURE EVALUATION
Anesthesia Evaluation     NPO Solid Status: > 8 hours  NPO Liquid Status: > 8 hours           Airway   Mallampati: I  No difficulty expected  Dental      Pulmonary    Cardiovascular   Exercise tolerance: good (4-7 METS)        Neuro/Psych  (+) numbness  GI/Hepatic/Renal/Endo    (+) GERD    Musculoskeletal     Abdominal    Substance History      OB/GYN          Other   arthritis,                 Anesthesia Plan    ASA 2     general     intravenous induction     Anesthetic plan, risks, benefits, and alternatives have been provided, discussed and informed consent has been obtained with: patient.    CODE STATUS:

## 2024-02-07 NOTE — ANESTHESIA PROCEDURE NOTES
Airway  Urgency: elective    Date/Time: 2/7/2024 2:59 PM  Airway not difficult    General Information and Staff    Patient location during procedure: OR  Anesthesiologist: Aries Novak MD  CRNA/CAA: Jass Ingram CRNA    Indications and Patient Condition  Indications for airway management: airway protection    Preoxygenated: yes  MILS not maintained throughout  Mask difficulty assessment: 1 - vent by mask    Final Airway Details  Final airway type: endotracheal airway      Successful airway: ETT  Cuffed: yes   Successful intubation technique: direct laryngoscopy  Endotracheal tube insertion site: oral  Blade: Sanket  Blade size: 4  ETT size (mm): 7.5  Cormack-Lehane Classification: grade I - full view of glottis  Placement verified by: chest auscultation and capnometry   Cuff volume (mL): 10  Measured from: teeth  ETT/EBT  to teeth (cm): 22  Number of attempts at approach: 1  Assessment: lips, teeth, and gum same as pre-op and atraumatic intubation

## 2024-09-27 ENCOUNTER — HOSPITAL ENCOUNTER (OUTPATIENT)
Facility: HOSPITAL | Age: 74
Setting detail: OBSERVATION
Discharge: HOME | End: 2024-09-28
Attending: EMERGENCY MEDICINE | Admitting: EMERGENCY MEDICINE
Payer: MEDICARE

## 2024-09-27 ENCOUNTER — APPOINTMENT (OUTPATIENT)
Dept: MRI IMAGING | Facility: HOSPITAL | Age: 74
End: 2024-09-27
Payer: MEDICARE

## 2024-09-27 DIAGNOSIS — R11.2 NAUSEA AND VOMITING, UNSPECIFIED VOMITING TYPE: ICD-10-CM

## 2024-09-27 DIAGNOSIS — R42 DIZZINESS: Primary | ICD-10-CM

## 2024-09-27 LAB
ALBUMIN SERPL-MCNC: 4.1 G/DL (ref 3.5–5.2)
ALBUMIN SERPL-MCNC: 4.1 G/DL (ref 3.5–5.2)
ALBUMIN/GLOB SERPL: 1.5 G/DL
ALBUMIN/GLOB SERPL: 1.5 G/DL
ALP SERPL-CCNC: 97 U/L (ref 39–117)
ALP SERPL-CCNC: 97 U/L (ref 39–117)
ALT SERPL W P-5'-P-CCNC: 13 U/L (ref 1–41)
ALT SERPL W P-5'-P-CCNC: 13 U/L (ref 1–41)
ANION GAP SERPL CALCULATED.3IONS-SCNC: 11.9 MMOL/L (ref 5–15)
ANION GAP SERPL CALCULATED.3IONS-SCNC: 11.9 MMOL/L (ref 5–15)
AST SERPL-CCNC: 15 U/L (ref 1–40)
AST SERPL-CCNC: 15 U/L (ref 1–40)
BASOPHILS # BLD AUTO: 0.05 10*3/MM3 (ref 0–0.2)
BASOPHILS # BLD AUTO: 0.05 10*3/MM3 (ref 0–0.2)
BASOPHILS NFR BLD AUTO: 0.9 % (ref 0–1.5)
BASOPHILS NFR BLD AUTO: 0.9 % (ref 0–1.5)
BILIRUB SERPL-MCNC: 0.6 MG/DL (ref 0–1.2)
BILIRUB SERPL-MCNC: 0.6 MG/DL (ref 0–1.2)
BUN SERPL-MCNC: 14 MG/DL (ref 8–23)
BUN SERPL-MCNC: 14 MG/DL (ref 8–23)
BUN/CREAT SERPL: 12.8 (ref 7–25)
BUN/CREAT SERPL: 12.8 (ref 7–25)
CALCIUM SPEC-SCNC: 9.8 MG/DL (ref 8.6–10.5)
CALCIUM SPEC-SCNC: 9.8 MG/DL (ref 8.6–10.5)
CHLORIDE SERPL-SCNC: 103 MMOL/L (ref 98–107)
CHLORIDE SERPL-SCNC: 103 MMOL/L (ref 98–107)
CO2 SERPL-SCNC: 24.1 MMOL/L (ref 22–29)
CO2 SERPL-SCNC: 24.1 MMOL/L (ref 22–29)
CREAT SERPL-MCNC: 1.09 MG/DL (ref 0.76–1.27)
CREAT SERPL-MCNC: 1.09 MG/DL (ref 0.76–1.27)
DEPRECATED RDW RBC AUTO: 40.7 FL (ref 37–54)
DEPRECATED RDW RBC AUTO: 40.7 FL (ref 37–54)
EGFRCR SERPLBLD CKD-EPI 2021: 70.3 ML/MIN/1.73
EGFRCR SERPLBLD CKD-EPI 2021: 70.3 ML/MIN/1.73
EOSINOPHIL # BLD AUTO: 0.19 10*3/MM3 (ref 0–0.4)
EOSINOPHIL # BLD AUTO: 0.19 10*3/MM3 (ref 0–0.4)
EOSINOPHIL NFR BLD AUTO: 3.5 % (ref 0.3–6.2)
EOSINOPHIL NFR BLD AUTO: 3.5 % (ref 0.3–6.2)
ERYTHROCYTE [DISTWIDTH] IN BLOOD BY AUTOMATED COUNT: 13.2 % (ref 12.3–15.4)
ERYTHROCYTE [DISTWIDTH] IN BLOOD BY AUTOMATED COUNT: 13.2 % (ref 12.3–15.4)
GLOBULIN UR ELPH-MCNC: 2.7 GM/DL
GLOBULIN UR ELPH-MCNC: 2.7 GM/DL
GLUCOSE SERPL-MCNC: 114 MG/DL (ref 65–99)
GLUCOSE SERPL-MCNC: 114 MG/DL (ref 65–99)
HCT VFR BLD AUTO: 39 % (ref 37.5–51)
HCT VFR BLD AUTO: 39 % (ref 37.5–51)
HGB BLD-MCNC: 12.8 G/DL (ref 13–17.7)
HGB BLD-MCNC: 12.8 G/DL (ref 13–17.7)
IMM GRANULOCYTES # BLD AUTO: 0.01 10*3/MM3 (ref 0–0.05)
IMM GRANULOCYTES # BLD AUTO: 0.01 10*3/MM3 (ref 0–0.05)
IMM GRANULOCYTES NFR BLD AUTO: 0.2 % (ref 0–0.5)
IMM GRANULOCYTES NFR BLD AUTO: 0.2 % (ref 0–0.5)
LYMPHOCYTES # BLD AUTO: 0.93 10*3/MM3 (ref 0.7–3.1)
LYMPHOCYTES # BLD AUTO: 0.93 10*3/MM3 (ref 0.7–3.1)
LYMPHOCYTES NFR BLD AUTO: 17.2 % (ref 19.6–45.3)
LYMPHOCYTES NFR BLD AUTO: 17.2 % (ref 19.6–45.3)
MAGNESIUM SERPL-MCNC: 2 MG/DL (ref 1.6–2.4)
MAGNESIUM SERPL-MCNC: 2 MG/DL (ref 1.6–2.4)
MCH RBC QN AUTO: 27.5 PG (ref 26.6–33)
MCH RBC QN AUTO: 27.5 PG (ref 26.6–33)
MCHC RBC AUTO-ENTMCNC: 32.8 G/DL (ref 31.5–35.7)
MCHC RBC AUTO-ENTMCNC: 32.8 G/DL (ref 31.5–35.7)
MCV RBC AUTO: 83.9 FL (ref 79–97)
MCV RBC AUTO: 83.9 FL (ref 79–97)
MONOCYTES # BLD AUTO: 0.27 10*3/MM3 (ref 0.1–0.9)
MONOCYTES # BLD AUTO: 0.27 10*3/MM3 (ref 0.1–0.9)
MONOCYTES NFR BLD AUTO: 5 % (ref 5–12)
MONOCYTES NFR BLD AUTO: 5 % (ref 5–12)
NEUTROPHILS NFR BLD AUTO: 3.95 10*3/MM3 (ref 1.7–7)
NEUTROPHILS NFR BLD AUTO: 3.95 10*3/MM3 (ref 1.7–7)
NEUTROPHILS NFR BLD AUTO: 73.2 % (ref 42.7–76)
NEUTROPHILS NFR BLD AUTO: 73.2 % (ref 42.7–76)
NRBC BLD AUTO-RTO: 0 /100 WBC (ref 0–0.2)
NRBC BLD AUTO-RTO: 0 /100 WBC (ref 0–0.2)
PLATELET # BLD AUTO: 232 10*3/MM3 (ref 140–450)
PLATELET # BLD AUTO: 232 10*3/MM3 (ref 140–450)
PMV BLD AUTO: 9.8 FL (ref 6–12)
PMV BLD AUTO: 9.8 FL (ref 6–12)
POTASSIUM SERPL-SCNC: 4.3 MMOL/L (ref 3.5–5.2)
POTASSIUM SERPL-SCNC: 4.3 MMOL/L (ref 3.5–5.2)
PROT SERPL-MCNC: 6.8 G/DL (ref 6–8.5)
PROT SERPL-MCNC: 6.8 G/DL (ref 6–8.5)
QT INTERVAL: 421 MS
QT INTERVAL: 421 MS
QTC INTERVAL: 438 MS
QTC INTERVAL: 438 MS
RBC # BLD AUTO: 4.65 10*6/MM3 (ref 4.14–5.8)
RBC # BLD AUTO: 4.65 10*6/MM3 (ref 4.14–5.8)
SODIUM SERPL-SCNC: 139 MMOL/L (ref 136–145)
SODIUM SERPL-SCNC: 139 MMOL/L (ref 136–145)
TROPONIN T SERPL HS-MCNC: 10 NG/L
TROPONIN T SERPL HS-MCNC: 10 NG/L
WBC NRBC COR # BLD AUTO: 5.4 10*3/MM3 (ref 3.4–10.8)
WBC NRBC COR # BLD AUTO: 5.4 10*3/MM3 (ref 3.4–10.8)

## 2024-09-27 PROCEDURE — 25810000003 SODIUM CHLORIDE 0.9 % SOLUTION: Performed by: EMERGENCY MEDICINE

## 2024-09-27 PROCEDURE — 25010000002 DIPHENHYDRAMINE PER 50 MG: Performed by: EMERGENCY MEDICINE

## 2024-09-27 PROCEDURE — 25010000002 PROCHLORPERAZINE 10 MG/2ML SOLUTION: Performed by: EMERGENCY MEDICINE

## 2024-09-27 PROCEDURE — 85025 COMPLETE CBC W/AUTO DIFF WBC: CPT | Performed by: EMERGENCY MEDICINE

## 2024-09-27 PROCEDURE — 84484 ASSAY OF TROPONIN QUANT: CPT | Performed by: EMERGENCY MEDICINE

## 2024-09-27 PROCEDURE — G0378 HOSPITAL OBSERVATION PER HR: HCPCS

## 2024-09-27 PROCEDURE — 99285 EMERGENCY DEPT VISIT HI MDM: CPT

## 2024-09-27 PROCEDURE — 70551 MRI BRAIN STEM W/O DYE: CPT

## 2024-09-27 PROCEDURE — 25010000002 LORAZEPAM PER 2 MG: Performed by: EMERGENCY MEDICINE

## 2024-09-27 PROCEDURE — 25810000003 LACTATED RINGERS PER 1000 ML: Performed by: NURSE PRACTITIONER

## 2024-09-27 PROCEDURE — 96376 TX/PRO/DX INJ SAME DRUG ADON: CPT

## 2024-09-27 PROCEDURE — 25010000002 MAGNESIUM SULFATE IN D5W 1G/100ML (PREMIX) 1-5 GM/100ML-% SOLUTION: Performed by: EMERGENCY MEDICINE

## 2024-09-27 PROCEDURE — 25010000002 ONDANSETRON PER 1 MG: Performed by: EMERGENCY MEDICINE

## 2024-09-27 PROCEDURE — 25010000002 ONDANSETRON PER 1 MG: Performed by: NURSE PRACTITIONER

## 2024-09-27 PROCEDURE — 83735 ASSAY OF MAGNESIUM: CPT | Performed by: EMERGENCY MEDICINE

## 2024-09-27 PROCEDURE — 80053 COMPREHEN METABOLIC PANEL: CPT | Performed by: EMERGENCY MEDICINE

## 2024-09-27 PROCEDURE — 93010 ELECTROCARDIOGRAM REPORT: CPT | Performed by: INTERNAL MEDICINE

## 2024-09-27 PROCEDURE — 96375 TX/PRO/DX INJ NEW DRUG ADDON: CPT

## 2024-09-27 PROCEDURE — 96374 THER/PROPH/DIAG INJ IV PUSH: CPT

## 2024-09-27 PROCEDURE — 93005 ELECTROCARDIOGRAM TRACING: CPT | Performed by: EMERGENCY MEDICINE

## 2024-09-27 RX ORDER — ACETAMINOPHEN 325 MG/1
650 TABLET ORAL EVERY 6 HOURS PRN
Status: DISCONTINUED | OUTPATIENT
Start: 2024-09-27 | End: 2024-09-28 | Stop reason: HOSPADM

## 2024-09-27 RX ORDER — MAGNESIUM SULFATE 1 G/100ML
1 INJECTION INTRAVENOUS ONCE
Status: COMPLETED | OUTPATIENT
Start: 2024-09-27 | End: 2024-09-27

## 2024-09-27 RX ORDER — PANTOPRAZOLE SODIUM 40 MG/1
40 TABLET, DELAYED RELEASE ORAL
Status: DISCONTINUED | OUTPATIENT
Start: 2024-09-28 | End: 2024-09-28 | Stop reason: HOSPADM

## 2024-09-27 RX ORDER — LORAZEPAM 2 MG/ML
0.5 INJECTION INTRAMUSCULAR ONCE
Status: COMPLETED | OUTPATIENT
Start: 2024-09-27 | End: 2024-09-27

## 2024-09-27 RX ORDER — ONDANSETRON 4 MG/1
4 TABLET, ORALLY DISINTEGRATING ORAL EVERY 6 HOURS PRN
Status: DISCONTINUED | OUTPATIENT
Start: 2024-09-27 | End: 2024-09-28 | Stop reason: HOSPADM

## 2024-09-27 RX ORDER — SODIUM CHLORIDE 0.9 % (FLUSH) 0.9 %
10 SYRINGE (ML) INJECTION AS NEEDED
Status: DISCONTINUED | OUTPATIENT
Start: 2024-09-27 | End: 2024-09-28 | Stop reason: HOSPADM

## 2024-09-27 RX ORDER — OMEPRAZOLE 40 MG/1
40 CAPSULE, DELAYED RELEASE ORAL DAILY
COMMUNITY

## 2024-09-27 RX ORDER — BISACODYL 10 MG
10 SUPPOSITORY, RECTAL RECTAL DAILY PRN
Status: DISCONTINUED | OUTPATIENT
Start: 2024-09-27 | End: 2024-09-28 | Stop reason: HOSPADM

## 2024-09-27 RX ORDER — SODIUM CHLORIDE, SODIUM LACTATE, POTASSIUM CHLORIDE, CALCIUM CHLORIDE 600; 310; 30; 20 MG/100ML; MG/100ML; MG/100ML; MG/100ML
50 INJECTION, SOLUTION INTRAVENOUS CONTINUOUS
Status: ACTIVE | OUTPATIENT
Start: 2024-09-27 | End: 2024-09-28

## 2024-09-27 RX ORDER — AMOXICILLIN 250 MG
2 CAPSULE ORAL 2 TIMES DAILY PRN
Status: DISCONTINUED | OUTPATIENT
Start: 2024-09-27 | End: 2024-09-28 | Stop reason: HOSPADM

## 2024-09-27 RX ORDER — BISACODYL 5 MG/1
5 TABLET, DELAYED RELEASE ORAL DAILY PRN
Status: DISCONTINUED | OUTPATIENT
Start: 2024-09-27 | End: 2024-09-28 | Stop reason: HOSPADM

## 2024-09-27 RX ORDER — SODIUM CHLORIDE 9 MG/ML
40 INJECTION, SOLUTION INTRAVENOUS AS NEEDED
Status: DISCONTINUED | OUTPATIENT
Start: 2024-09-27 | End: 2024-09-28 | Stop reason: HOSPADM

## 2024-09-27 RX ORDER — MECLIZINE HYDROCHLORIDE 25 MG/1
25 TABLET ORAL 3 TIMES DAILY PRN
Status: DISCONTINUED | OUTPATIENT
Start: 2024-09-27 | End: 2024-09-28 | Stop reason: HOSPADM

## 2024-09-27 RX ORDER — DIPHENHYDRAMINE HYDROCHLORIDE 50 MG/ML
25 INJECTION INTRAMUSCULAR; INTRAVENOUS ONCE
Status: COMPLETED | OUTPATIENT
Start: 2024-09-27 | End: 2024-09-27

## 2024-09-27 RX ORDER — PROCHLORPERAZINE EDISYLATE 5 MG/ML
5 INJECTION INTRAMUSCULAR; INTRAVENOUS ONCE
Status: COMPLETED | OUTPATIENT
Start: 2024-09-27 | End: 2024-09-27

## 2024-09-27 RX ORDER — ONDANSETRON 2 MG/ML
4 INJECTION INTRAMUSCULAR; INTRAVENOUS ONCE
Status: COMPLETED | OUTPATIENT
Start: 2024-09-27 | End: 2024-09-27

## 2024-09-27 RX ORDER — NAPROXEN 500 MG/1
500 TABLET ORAL 2 TIMES DAILY PRN
COMMUNITY

## 2024-09-27 RX ORDER — TAMSULOSIN HYDROCHLORIDE 0.4 MG/1
0.4 CAPSULE ORAL DAILY
Status: DISCONTINUED | OUTPATIENT
Start: 2024-09-27 | End: 2024-09-28 | Stop reason: HOSPADM

## 2024-09-27 RX ORDER — ONDANSETRON 2 MG/ML
4 INJECTION INTRAMUSCULAR; INTRAVENOUS EVERY 6 HOURS PRN
Status: DISCONTINUED | OUTPATIENT
Start: 2024-09-27 | End: 2024-09-28 | Stop reason: HOSPADM

## 2024-09-27 RX ORDER — POLYETHYLENE GLYCOL 3350 17 G/17G
17 POWDER, FOR SOLUTION ORAL DAILY PRN
Status: DISCONTINUED | OUTPATIENT
Start: 2024-09-27 | End: 2024-09-28 | Stop reason: HOSPADM

## 2024-09-27 RX ORDER — SODIUM CHLORIDE 0.9 % (FLUSH) 0.9 %
10 SYRINGE (ML) INJECTION EVERY 12 HOURS SCHEDULED
Status: DISCONTINUED | OUTPATIENT
Start: 2024-09-27 | End: 2024-09-28 | Stop reason: HOSPADM

## 2024-09-27 RX ORDER — TAMSULOSIN HYDROCHLORIDE 0.4 MG/1
1 CAPSULE ORAL DAILY
COMMUNITY

## 2024-09-27 RX ADMIN — Medication 10 ML: at 21:23

## 2024-09-27 RX ADMIN — ACETAMINOPHEN 325MG 650 MG: 325 TABLET ORAL at 17:47

## 2024-09-27 RX ADMIN — ONDANSETRON 4 MG: 2 INJECTION, SOLUTION INTRAMUSCULAR; INTRAVENOUS at 11:24

## 2024-09-27 RX ADMIN — Medication 10 ML: at 17:48

## 2024-09-27 RX ADMIN — PROCHLORPERAZINE EDISYLATE 5 MG: 5 INJECTION INTRAMUSCULAR; INTRAVENOUS at 21:23

## 2024-09-27 RX ADMIN — SODIUM CHLORIDE 500 ML: 9 INJECTION, SOLUTION INTRAVENOUS at 11:23

## 2024-09-27 RX ADMIN — LORAZEPAM 0.5 MG: 2 INJECTION INTRAMUSCULAR; INTRAVENOUS at 11:25

## 2024-09-27 RX ADMIN — DIPHENHYDRAMINE HYDROCHLORIDE 25 MG: 50 INJECTION, SOLUTION INTRAMUSCULAR; INTRAVENOUS at 21:24

## 2024-09-27 RX ADMIN — MECLIZINE HYDROCHLORIDE 25 MG: 25 TABLET ORAL at 15:40

## 2024-09-27 RX ADMIN — TAMSULOSIN HYDROCHLORIDE 0.4 MG: 0.4 CAPSULE ORAL at 17:48

## 2024-09-27 RX ADMIN — Medication 10 ML: at 11:12

## 2024-09-27 RX ADMIN — LORAZEPAM 0.5 MG: 2 INJECTION INTRAMUSCULAR; INTRAVENOUS at 23:00

## 2024-09-27 RX ADMIN — MAGNESIUM SULFATE IN DEXTROSE 1 G: 10 INJECTION, SOLUTION INTRAVENOUS at 21:25

## 2024-09-27 RX ADMIN — SODIUM CHLORIDE, POTASSIUM CHLORIDE, SODIUM LACTATE AND CALCIUM CHLORIDE 50 ML/HR: 600; 310; 30; 20 INJECTION, SOLUTION INTRAVENOUS at 16:01

## 2024-09-27 RX ADMIN — ONDANSETRON 4 MG: 2 INJECTION, SOLUTION INTRAMUSCULAR; INTRAVENOUS at 15:40

## 2024-09-27 NOTE — ED PROVIDER NOTES
EMERGENCY DEPARTMENT ENCOUNTER    Room Number:  16/16  PCP: Gordon Almonte MD  Historian: Patient    I initially evaluated the patient at 1111    HPI:  Chief Complaint: Dizziness, nausea, vomiting  A complete HPI/ROS/PMH/PSH/SH/FH are unobtainable due to: Nothing  Context: Omid Manning is a 74 y.o. male with a medical history of vertigo who presents to the ED c/o acute dizziness, nausea, and vomiting.  Patient became nauseated and dizzy around 11 PM last night.  When he woke up this morning, symptoms were still present.  When he tried to walk to the bathroom, he was very unsteady and had to hold onto the wall to keep from falling.  He then had a few episodes of vomiting.  Symptoms improve but to not resolve at rest.  Symptoms are worse with standing up and movement of the head.  Patient had similar episodes after having surgery last year.  He was diagnosed with vertigo at that time.  He denies headache, chest pain, abdominal pain, syncope, palpitations, vision changes, or numbness/tingling/weakness in his extremities.            PAST MEDICAL HISTORY  Active Ambulatory Problems     Diagnosis Date Noted    No Active Ambulatory Problems     Resolved Ambulatory Problems     Diagnosis Date Noted    No Resolved Ambulatory Problems     Past Medical History:   Diagnosis Date    Vertigo          PAST SURGICAL HISTORY  History reviewed. No pertinent surgical history.      FAMILY HISTORY  History reviewed. No pertinent family history.      SOCIAL HISTORY  Social History     Socioeconomic History    Marital status:          ALLERGIES  Patient has no known allergies.    REVIEW OF SYSTEMS  Review of Systems  Included in HPI  All systems reviewed and negative except for those discussed in HPI.      PHYSICAL EXAM  ED Triage Vitals   Temp Heart Rate Resp BP SpO2   09/27/24 1055 09/27/24 1055 09/27/24 1055 09/27/24 1103 09/27/24 1055   96.8 °F (36 °C) 79 18 (!) 144/102 100 %      Temp src Heart Rate Source Patient  Position BP Location FiO2 (%)   -- 09/27/24 1103 -- -- --    Monitor          Physical Exam      GENERAL: Awake, alert, oriented x 3.  Well-developed, well-nourished elderly male.  Resting comfortably in no acute distress  HENT: NCAT, nares patent  EYES: PERRL, EOMI, there is fatigable lateral nystagmus on right lateral gaze  CV: regular rhythm, normal rate  RESPIRATORY: normal effort, clear to auscultation bilaterally  ABDOMEN: soft, nondistended, nontender  MUSCULOSKELETAL: Extremities are nontender with full range of motion  NEURO: Speech is clear and fluent.  No aphasia.  Tongue protrudes midline.  No facial droop.  Normal strength and light touch sensation in all extremities.  Normal finger-to-nose and heel shin testing bilaterally  PSYCH:  calm, cooperative  SKIN: warm, dry    Vital signs and nursing notes reviewed.          LAB RESULTS  Recent Results (from the past 24 hour(s))   Comprehensive Metabolic Panel    Collection Time: 09/27/24 11:13 AM    Specimen: Blood   Result Value Ref Range    Glucose 114 (H) 65 - 99 mg/dL    BUN 14 8 - 23 mg/dL    Creatinine 1.09 0.76 - 1.27 mg/dL    Sodium 139 136 - 145 mmol/L    Potassium 4.3 3.5 - 5.2 mmol/L    Chloride 103 98 - 107 mmol/L    CO2 24.1 22.0 - 29.0 mmol/L    Calcium 9.8 8.6 - 10.5 mg/dL    Total Protein 6.8 6.0 - 8.5 g/dL    Albumin 4.1 3.5 - 5.2 g/dL    ALT (SGPT) 13 1 - 41 U/L    AST (SGOT) 15 1 - 40 U/L    Alkaline Phosphatase 97 39 - 117 U/L    Total Bilirubin 0.6 0.0 - 1.2 mg/dL    Globulin 2.7 gm/dL    A/G Ratio 1.5 g/dL    BUN/Creatinine Ratio 12.8 7.0 - 25.0    Anion Gap 11.9 5.0 - 15.0 mmol/L    eGFR 70.3 >60.0 mL/min/1.73   CBC Auto Differential    Collection Time: 09/27/24 11:13 AM    Specimen: Blood   Result Value Ref Range    WBC 5.40 3.40 - 10.80 10*3/mm3    RBC 4.65 4.14 - 5.80 10*6/mm3    Hemoglobin 12.8 (L) 13.0 - 17.7 g/dL    Hematocrit 39.0 37.5 - 51.0 %    MCV 83.9 79.0 - 97.0 fL    MCH 27.5 26.6 - 33.0 pg    MCHC 32.8 31.5 - 35.7 g/dL     RDW 13.2 12.3 - 15.4 %    RDW-SD 40.7 37.0 - 54.0 fl    MPV 9.8 6.0 - 12.0 fL    Platelets 232 140 - 450 10*3/mm3    Neutrophil % 73.2 42.7 - 76.0 %    Lymphocyte % 17.2 (L) 19.6 - 45.3 %    Monocyte % 5.0 5.0 - 12.0 %    Eosinophil % 3.5 0.3 - 6.2 %    Basophil % 0.9 0.0 - 1.5 %    Immature Grans % 0.2 0.0 - 0.5 %    Neutrophils, Absolute 3.95 1.70 - 7.00 10*3/mm3    Lymphocytes, Absolute 0.93 0.70 - 3.10 10*3/mm3    Monocytes, Absolute 0.27 0.10 - 0.90 10*3/mm3    Eosinophils, Absolute 0.19 0.00 - 0.40 10*3/mm3    Basophils, Absolute 0.05 0.00 - 0.20 10*3/mm3    Immature Grans, Absolute 0.01 0.00 - 0.05 10*3/mm3    nRBC 0.0 0.0 - 0.2 /100 WBC   Single High Sensitivity Troponin T    Collection Time: 09/27/24 11:13 AM    Specimen: Blood   Result Value Ref Range    HS Troponin T 10 <22 ng/L   Magnesium    Collection Time: 09/27/24 11:13 AM    Specimen: Blood   Result Value Ref Range    Magnesium 2.0 1.6 - 2.4 mg/dL   ECG 12 Lead Other; Dizziness    Collection Time: 09/27/24 11:23 AM   Result Value Ref Range    QT Interval 421 ms    QTC Interval 438 ms       Ordered the above labs and reviewed the results.        RADIOLOGY  No Radiology Exams Resulted Within Past 24 Hours    Ordered the above noted radiological studies. Reviewed by me in PACS.            PROCEDURES  Procedures        OUTPATIENT MEDICATION MANAGEMENT:  Current Facility-Administered Medications Ordered in Epic   Medication Dose Route Frequency Provider Last Rate Last Admin    sennosides-docusate (PERICOLACE) 8.6-50 MG per tablet 2 tablet  2 tablet Oral BID PRN Aniceto, Anne Marie, APRN        And    polyethylene glycol (MIRALAX) packet 17 g  17 g Oral Daily PRN Aniceto, Anne Marie, APRN        And    bisacodyl (DULCOLAX) EC tablet 5 mg  5 mg Oral Daily PRN Aniceto, Anne Marie, APRN        And    bisacodyl (DULCOLAX) suppository 10 mg  10 mg Rectal Daily PRN Anne Marie Moreland, JUNIOR        lactated ringers infusion  50 mL/hr Intravenous Continuous Anne Marie Moreland, JNUIOR         meclizine (ANTIVERT) tablet 25 mg  25 mg Oral TID PRN Aniceto, Anne Marie, APRN        ondansetron ODT (ZOFRAN-ODT) disintegrating tablet 4 mg  4 mg Oral Q6H PRN Aniceto, Anne Marie, APRN        Or    ondansetron (ZOFRAN) injection 4 mg  4 mg Intravenous Q6H PRN Aniceto, Anne Marie, APRN        sodium chloride 0.9 % flush 10 mL  10 mL Intravenous PRN Bam Monsalve MD   10 mL at 09/27/24 1112    sodium chloride 0.9 % flush 10 mL  10 mL Intravenous Q12H Aniceto, Anne Marie, APRN        sodium chloride 0.9 % flush 10 mL  10 mL Intravenous PRN Aniceto, Anne Marie, APRN        sodium chloride 0.9 % infusion 40 mL  40 mL Intravenous PRN Aniceto, Anne Marie, APRN         Current Outpatient Medications Ordered in Epic   Medication Sig Dispense Refill    naproxen (NAPROSYN) 500 MG tablet Take 1 tablet by mouth 2 (Two) Times a Day As Needed for Mild Pain.      omeprazole (priLOSEC) 40 MG capsule Take 1 capsule by mouth Daily.      tamsulosin (FLOMAX) 0.4 MG capsule 24 hr capsule Take 1 capsule by mouth Daily.             MEDICATIONS GIVEN IN ER  Medications   sodium chloride 0.9 % flush 10 mL (10 mL Intravenous Given 9/27/24 1112)   sodium chloride 0.9 % flush 10 mL (has no administration in time range)   sodium chloride 0.9 % flush 10 mL (has no administration in time range)   sodium chloride 0.9 % infusion 40 mL (has no administration in time range)   ondansetron ODT (ZOFRAN-ODT) disintegrating tablet 4 mg (has no administration in time range)     Or   ondansetron (ZOFRAN) injection 4 mg (has no administration in time range)   sennosides-docusate (PERICOLACE) 8.6-50 MG per tablet 2 tablet (has no administration in time range)     And   polyethylene glycol (MIRALAX) packet 17 g (has no administration in time range)     And   bisacodyl (DULCOLAX) EC tablet 5 mg (has no administration in time range)     And   bisacodyl (DULCOLAX) suppository 10 mg (has no administration in time range)   meclizine (ANTIVERT) tablet 25 mg (has no administration in time  range)   lactated ringers infusion (has no administration in time range)   sodium chloride 0.9 % bolus 500 mL (0 mL Intravenous Stopped 9/27/24 1153)   ondansetron (ZOFRAN) injection 4 mg (4 mg Intravenous Given 9/27/24 1124)   LORazepam (ATIVAN) injection 0.5 mg (0.5 mg Intravenous Given 9/27/24 1125)                   MEDICAL DECISION MAKING, PROGRESS, and CONSULTS    All labs have been independently reviewed by me.  All radiology studies have been reviewed by me and I have also reviewed the radiology report.   EKG's independently viewed and interpreted by me.  Discussion below represents my analysis of pertinent findings related to patient's condition, differential diagnosis, treatment plan and final disposition.      Additional sources:    - Discussed/ obtained information from independent historians: None    - External (non-ED) record review: Patient was admitted here in August 2021 for paresthesias in both legs.  He was seen by neurology.  MRI of the cervical/thoracic/bar spine was negative acute.    -Prescription drug monitoring program review:     N/A    - Chronic or social conditions impacting patient care (Social Determinants of Health): None          Orders placed during this visit:  Orders Placed This Encounter   Procedures    MRI Brain Without Contrast    Comprehensive Metabolic Panel    CBC Auto Differential    Single High Sensitivity Troponin T    Magnesium    NPO Diet NPO Type: Strict NPO    Vital Signs    Up With Assistance    Advance Diet As Tolerated -    Intake & Output    Weigh Patient    Neuro Checks    Saline Lock & Maintain IV Access    Place Sequential Compression Device    Maintain Sequential Compression Device    Orthostatic Blood Pressure    Code Status and Medical Interventions: CPR (Attempt to Resuscitate); Limited Support; No intubation (DNI)    PT Consult: Eval & Treat Other; vestibular therapy    ECG 12 Lead Other; Dizziness    Telemetry Scan    Telemetry Scan    Insert Peripheral IV     Insert Peripheral IV    Initiate ED Observation Status    Fall Precautions    Aspiration Precautions    CBC & Differential         Additional orders considered but not ordered:          Differential diagnosis includes, but is not limited to:    BPPV, labyrinthitis, vestibular neuritis, TIA, CVA, orthostasis      Independent interpretation of labs, radiology studies, and discussions with consultants:  ED Course as of 09/27/24 1336   Fri Sep 27, 2024   1108 BP(!): 144/102 [WH]   1108 Temp: 96.8 °F (36 °C) [WH]   1108 Heart Rate: 79 [WH]   1108 Resp: 18 [WH]   1108 SpO2: 100 % [WH]   1108 Device (Oxygen Therapy): room air [WH]   1121 Patient presents to the ED with acute dizziness, nausea, and vomiting.  He has a history of vertigo.  On exam, there is nystagmus with right lateral gaze.  He does not have any focal weakness.  Will obtain labs and EKG for further evaluation.  He will be given IV fluids and IV medications for dizziness/nausea.  Differential diagnosis includes but is not limited to: CVA, TIA, BPPV, dehydration [WH]   1128 EKG personally interpreted by me at 11:26 AM.  My personal interpretation is:          EKG time: 11:23 AM  Rhythm/Rate: Sinus rhythm, rate 65  P waves and WI: Normal  QRS, axis: IVCD, inferior Q waves  ST and T waves: Normal    Interpreted Contemporaneously by me, independently viewed  EKG is not significantly changed compared to prior EKG done on 8/17/2024   [WH]   1145 WBC: 5.40 [WH]   1145 Hemoglobin(!): 12.8 [WH]   1207 HS Troponin T: 10 [WH]   1207 Magnesium: 2.0 [WH]   1207 Glucose(!): 114 [WH]   1207 BUN: 14 [WH]   1207 Creatinine: 1.09 [WH]   1213 Test results and diagnosis were discussed with the patient.  His nausea has improved but he still dizzy.  Shared decision making was discussed and admission was recommended.  He is agreeable with this. [WH]   1218 Case discussed with MELISA Benjamin, and she agrees to admit the patient to Dr. Herrmann.  Pertinent history, exam findings,  test results, ED course, and diagnoses were discussed with her. [WH]   1239 MDM: Patient presented to the ED complaining of acute onset of dizziness, nausea, and vomiting.  Patient has a history of vertigo.  Symptoms are most suggestive of BPPV.  Symptoms improved but did not resolve after IV fluids and IV medications.  Patient was still having difficulty walking.  He will be admitted for further evaluation/treatment. [WH]      ED Course User Index  [WH] Bam Monsalve MD         COMPLEXITY OF CARE  The patient requires admission.      DIAGNOSIS  Final diagnoses:   Dizziness   Nausea and vomiting, unspecified vomiting type         DISPOSITION  ADMISSION    Discussed treatment plan and reason for admission with pt/family and admitting physician.  Pt/family voiced understanding of the plan for admission for further testing/treatment as needed.               Latest Documented Vital Signs:  AS OF 13:36 EDT VITALS:    BP - 126/92  HR - 84  TEMP - 96.8 °F (36 °C)  O2 SATS - 93%            --    Please note that portions of this were completed with a voice recognition program.       Note Disclaimer: At [facility], we believe that sharing information builds trust and better relationships. You are receiving this note because you are receiving care at [facility] or recently visited. It is possible you will see health information before a provider has talked with you about it. This kind of information can be easy to misunderstand. To help you fully understand what it means for your health, we urge you to discuss this note with your provider.             Bam Monsalve MD  09/27/24 2640

## 2024-09-27 NOTE — PROGRESS NOTES
Clinical Pharmacy Services: Medication History    Omid Manning is a 74 y.o. male presenting to [facility] for   Chief Complaint   Patient presents with    Vomiting    Dizziness       He  has a past medical history of Vertigo.    Allergies as of 09/27/2024    (No Known Allergies)       Med[facility]   Pharmacy and Phone Number:     Prior to Admission Medications       Prescriptions Last Dose Informant Patient Reported? Taking?    naproxen (NAPROSYN) 500 MG tablet  Pharmacy Yes Yes    Take 1 tablet by mouth 2 (Two) Times a Day As Needed for Mild Pain.    omeprazole (priLOSEC) 40 MG capsule  Pharmacy Yes Yes    Take 1 capsule by mouth Daily.    tamsulosin (FLOMAX) 0.4 MG capsule 24 hr capsule  Pharmacy Yes Yes    Take 1 capsule by mouth Daily.              Medication notes:     This medication list is complete to the best of my knowledge as of 9/27/2024    Please call if questions.    Sandrine Mata  Medication History Technician   [phone]    9/27/2024 13:10 EDT

## 2024-09-27 NOTE — ED NOTES
Nursing report ED to floor  Omid Manning  74 y.o.  male    HPI :  HPI (Adult)  Stated Reason for Visit: dizziness n/v  History Obtained From: patient  Duration (Days): 1    Chief Complaint  Chief Complaint   Patient presents with    Vomiting    Dizziness       Admitting doctor:   Breezy Herrmann MD    Admitting diagnosis:   The primary encounter diagnosis was Dizziness. A diagnosis of Nausea and vomiting, unspecified vomiting type was also pertinent to this visit.    Code status:   Current Code Status       Date Active Code Status Order ID Comments User Context       9/27/2024 1248 CPR (Attempt to Resuscitate) [id number]  Anne Marie Moreland APRN ED        Question Answer    Code Status (Patient has no pulse and is not breathing) CPR (Attempt to Resuscitate)    Medical Interventions (Patient has pulse or is breathing) Limited Support    Medical Intervention Limits: No intubation (DNI)    Level Of Support Discussed With Patient                    Allergies:   Patient has no known allergies.    Isolation:   No active isolations    Intake and Output    Intake/Output Summary (Last 24 hours) at 9/27/2024 1334  Last data filed at 9/27/2024 1153  Gross per 24 hour   Intake 500 ml   Output --   Net 500 ml       Weight:       09/27/24  1055   Weight: 83.9 kg (185 lb)       Most recent vitals:   Vitals:    09/27/24 1231 09/27/24 1258 09/27/24 1301 09/27/24 1331   BP: 131/80  136/88 126/92   BP Location:       Patient Position:       Pulse: 89 100 82 84   Resp:       Temp:       SpO2: 97% 93%     Weight:       Height:           Active LDAs/IV Access:   Lines, Drains & Airways       Active LDAs       Name Placement date Placement time Site Days    Peripheral IV 09/27/24 1112 Right Antecubital 09/27/24  1112  Antecubital  less than 1                    Labs (abnormal labs have a star):   Labs Reviewed   COMPREHENSIVE METABOLIC PANEL - Abnormal; Notable for the following components:       Result Value    Glucose 114 (*)     All  other components within normal limits    Narrative:     GFR Normal >60  Chronic Kidney Disease <60  Kidney Failure <15    The GFR formula is only valid for adults with stable renal function between ages 18 and 70.   CBC WITH AUTO DIFFERENTIAL - Abnormal; Notable for the following components:    Hemoglobin 12.8 (*)     Lymphocyte % 17.2 (*)     All other components within normal limits   SINGLE HS TROPONIN T - Normal    Narrative:     High Sensitive Troponin T Reference Range:  <14.0 ng/L- Negative Female for AMI  <22.0 ng/L- Negative Male for AMI  >=14 - Abnormal Female indicating possible myocardial injury.  >=22 - Abnormal Male indicating possible myocardial injury.   Clinicians would have to utilize clinical acumen, EKG, Troponin, and serial changes to determine if it is an Acute Myocardial Infarction or myocardial injury due to an underlying chronic condition.        MAGNESIUM - Normal   CBC AND DIFFERENTIAL    Narrative:     The following orders were created for panel order CBC & Differential.  Procedure                               Abnormality         Status                     ---------                               -----------         ------                     CBC Auto Differential[[id number]]        Abnormal            Final result                 Please view results for these tests on the individual orders.       EKG:   ECG 12 Lead Other; Dizziness   Preliminary Result   HEART RATE=65  bpm   RR Vxxujzpe=367  ms   OR Onhslvdt=000  ms   P Horizontal Axis=-13  deg   P Front Axis=43  deg   QRSD Kmwjqzfc=320  ms   QT Qkerutpl=558  ms   QArU=352  ms   QRS Axis=47  deg   T Wave Axis=28  deg   - ABNORMAL ECG -   Sinus rhythm   IVCD, consider atypical RBBB   Abnormal inferior Q waves   Date and Time of Study:2024-09-27 11:23:30      Telemetry Scan   Final Result      Telemetry Scan   Final Result          Meds given in ED:   Medications   sodium chloride 0.9 % flush 10 mL (10 mL Intravenous Given 9/27/24 1112)    sodium chloride 0.9 % flush 10 mL (has no administration in time range)   sodium chloride 0.9 % flush 10 mL (has no administration in time range)   sodium chloride 0.9 % infusion 40 mL (has no administration in time range)   ondansetron ODT (ZOFRAN-ODT) disintegrating tablet 4 mg (has no administration in time range)     Or   ondansetron (ZOFRAN) injection 4 mg (has no administration in time range)   sennosides-docusate (PERICOLACE) 8.6-50 MG per tablet 2 tablet (has no administration in time range)     And   polyethylene glycol (MIRALAX) packet 17 g (has no administration in time range)     And   bisacodyl (DULCOLAX) EC tablet 5 mg (has no administration in time range)     And   bisacodyl (DULCOLAX) suppository 10 mg (has no administration in time range)   meclizine (ANTIVERT) tablet 25 mg (has no administration in time range)   lactated ringers infusion (has no administration in time range)   sodium chloride 0.9 % bolus 500 mL (0 mL Intravenous Stopped 9/27/24 1153)   ondansetron (ZOFRAN) injection 4 mg (4 mg Intravenous Given 9/27/24 1124)   LORazepam (ATIVAN) injection 0.5 mg (0.5 mg Intravenous Given 9/27/24 1125)       Imaging results:  No radiology results for the last day    Ambulatory status:   - assist    Social issues:   Social History     Socioeconomic History    Marital status:        Peripheral Neurovascular  Peripheral Neurovascular (Adult)  Peripheral Neurovascular WDL: WDL, pulse assessment  Pulse Assessment: radial    Neuro Cognitive  Neuro Cognitive (Adult)  Cognitive/Neuro/Behavioral WDL: WDL, arousability, level of consciousness, orientation  Level of Consciousness: Alert  Arousal Level: opens eyes spontaneously  Orientation: oriented x 4  Pupils  Pupil PERRLA: yes    Learning  Learning Assessment (Adult)  Learning Readiness and Ability: no barriers identified  Education Provided  Person Taught: patient  Teaching Method: verbal instruction  Teaching Focus: symptom/problem  overview    Respiratory  Respiratory WDL  Respiratory WDL: WDL, all  Rhythm/Pattern, Respiratory: no shortness of breath reported, depth regular, pattern regular, unlabored    Abdominal Pain       Pain Assessments  Pain (Adult)  (0-10) Pain Rating: Rest: 0    NIH Stroke Scale       Lila Jiménez RN  09/27/24 13:34 EDT

## 2024-09-27 NOTE — PROGRESS NOTES
Patient Care Team:  Gordon Almonte MD as PCP - General (Family Medicine)     LU[facility] BL[facility] H&P Note    I supervised care provided by the midlevel provider. We have discussed this patient's history, physical exam, and treatment plan. I have reviewed the midlevel provider's note and I agree with the midlevel provider's findings and plan of care.   SHARED VISIT: This visit was performed by BOTH a physician and an APC. The substantive portion of the medical decision making was performed by this attesting physician who made or approved the management plan and takes responsibility for patient management.   I have personally had a face to face encounter with the patient.   My personal findings are documented below:    History:  74-year-old male presents with vertigo with nausea vomiting.  Symptoms intractable in the ED.  ED workup unremarkable other than glucose 114.    Physical Exam:  General: No acute distress.  HENT: NCAT, PERRL, Nares patent.  Eyes: no scleral icterus.  Neck: trachea midline, no ROM limitations.  CV: regular rhythm, regular rate.  Respiratory: normal effort, CTAB.  Abdomen: soft, nondistended, NTTP, no rebound tenderness, no guarding or rigidity.  Musculoskeletal: no deformity.  Neuro: alert, moves all extremities, follows commands.  Skin: warm, dry.    Assessment and Plan:  Patient admitted to the observation unit, obtaining MRI brain, treating vertigo, PT consulted for vestibular therapy, antiemetics, IV fluids, monitoring.  If MRI positive for symptoms not improving will consult neuro.

## 2024-09-27 NOTE — H&P
[facility]   HISTORY AND PHYSICAL    Patient Name: Omid Manning  : 1950  MRN: [id number]  Primary Care Physician:  Gordon Almonte MD  Date of admission: 2024    Subjective   Subjective     Chief Complaint:   Chief Complaint   Patient presents with    Vomiting    Dizziness         HPI:    Omid Manning is a 74 y.o. male no significant past medical history. Presented for acute onset dizziness with nausea and vomiting. He reports coming to hospital yesterday for his wife's planned knee surgery. He states he felt fine all day until around 11pm at night when he felt some dizziness. Symptoms were present when he woke up this morning and he reports feeling unsteady with ambulation to bathroom. He denies any trauma, denies fever. No medications changes reported. He endorses similar situation post op from shoulder surgery which improved with meclizine. He states symptoms have been intermittent, mild, and short lasting and frequency is monthly. He denies any headache, chest pain, syncope, vision changes. He denies numbness, weakness, paraesthesia. On exam he does have peripheral nystagmus, no vertical nystagmus noted. No extremity weakness appreciated. Wear glasses, no vision changes reported. Labs unremarkable. EKG reviewed.         Peripheral positional vertigo     Review of Systems   All systems were reviewed and negative except for: dizziness, nausea, vomiting, difficulty ambulating due to room spinning    Personal History     Past Medical History:   Diagnosis Date    Vertigo        History reviewed. No pertinent surgical history.    Family History: family history is not on file. Otherwise pertinent FHx was reviewed and not pertinent to current issue.    Social History:      Home Medications:       Allergies:  No Known Allergies    Objective   Objective     Vitals:   Temp:  [96.8 °F (36 °C)] 96.8 °F (36 °C)  Heart Rate:  [77-79] 77  Resp:  [18] 18  BP: (144)/(102) 144/102  Physical  Exam    Constitutional: Awake, alert, elderly male   Eyes: PERRLA, sclerae anicteric, no conjunctival injection; peripheral nystagmus    HENT: NCAT, mucous membranes moist   Neck: Supple, no thyromegaly, no lymphadenopathy, trachea midline   Respiratory: Clear to auscultation bilaterally, nonlabored respirations    Cardiovascular: RRR, no murmurs, rubs, or gallops, palpable pedal pulses bilaterally   Gastrointestinal: Positive bowel sounds, soft, nontender, nondistended   Musculoskeletal: No bilateral ankle edema, no clubbing or cyanosis to extremities   Psychiatric: Appropriate affect, cooperative   Neurologic: Oriented x 3, strength symmetric in all extremities, Cranial Nerves grossly intact to confrontation, speech clear   Skin: No rashes     Result Review    Result Review:  I have personally reviewed the results from the time of this admission to 9/27/2024 12:19 EDT and agree with these findings:  [x]  Laboratory list / accordion  []  Microbiology  []  Radiology  [x]  EKG/Telemetry   []  Cardiology/Vascular   []  Pathology  []  Old records  []  Other:  Most notable findings include: glucose 114, hemoglobin 12.8. EKG reviewed      Assessment & Plan   Assessment / Plan     Brief Patient Summary:  Omid Manning is a 74 y.o. male with no significant past medical history who presented for acute onset dizziness with nausea and vomiting. He reports coming to hospital yesterday for his wife's planned knee surgery. He states he felt fine all day until around 11pm at night when he felt some dizziness. Symptoms were present when he woke up this morning and he reports feeling unsteady with ambulation to bathroom. He denies any trauma, denies fever. No medications changes reported. He endorses similar situation post op from shoulder surgery which improved with meclizine. He states symptoms have been intermittent, mild, and short lasting and frequency is monthly. He denies any headache, chest pain, syncope, vision  changes. He denies numbness, weakness, paraesthesia. On exam he does have peripheral nystagmus, no vertical nystagmus noted. No extremity weakness appreciated. Wear glasses, no vision changes reported. Labs unremarkable. EKG reviewed.    Active Hospital Problems:  There are no active hospital problems to display for this patient.    Plan:   Dizziness:  -anticholinergics - meclizine  -consult PT for vestibular therapy   -MRI brain wo  -NPO for now  -will check ortho static VS, has received saline 500mL  -will continue gentle IVF LR at 50 cc per hour  -Neuro checks every 4 hours  -vitals every 4 hours  -telemetry monitoring  -consider neurology consult if positive findings on MRI or symptoms not improving.     Nausea, vomiting:  -antiemetics, zofran if no improvement will consider phenegran, compazine    VTE Prophylaxis:  No VTE prophylaxis order currently exists.        CODE STATUS:       Admission Status:  I believe this patient meets observation status.    Electronically signed by JUNIOR Dixon, 09/27/24, 12:19 PM EDT.        75 minutes has been spent by [facility] providers in the care of this patient while under observation status      I have worn appropriate PPE during this patient encounter, sanitized my hands both with entering and exiting patient's room.    I have discussed plan of care with patient including advance care plan and/or surrogate decision maker.  Patient advises that their spouse, Brenda Manning, will be their primary surrogate decision maker

## 2024-09-28 VITALS
DIASTOLIC BLOOD PRESSURE: 72 MMHG | BODY MASS INDEX: 25.9 KG/M2 | WEIGHT: 185 LBS | OXYGEN SATURATION: 94 % | HEART RATE: 87 BPM | HEART RATE: 87 BPM | DIASTOLIC BLOOD PRESSURE: 72 MMHG | HEIGHT: 71 IN | HEIGHT: 71 IN | SYSTOLIC BLOOD PRESSURE: 120 MMHG | RESPIRATION RATE: 20 BRPM | TEMPERATURE: 97.6 F | BODY MASS INDEX: 25.9 KG/M2 | WEIGHT: 185 LBS | TEMPERATURE: 97.6 F | OXYGEN SATURATION: 94 % | SYSTOLIC BLOOD PRESSURE: 120 MMHG | RESPIRATION RATE: 20 BRPM

## 2024-09-28 LAB
ANION GAP SERPL CALCULATED.3IONS-SCNC: 9 MMOL/L (ref 5–15)
ANION GAP SERPL CALCULATED.3IONS-SCNC: 9 MMOL/L (ref 5–15)
BUN SERPL-MCNC: 14 MG/DL (ref 8–23)
BUN SERPL-MCNC: 14 MG/DL (ref 8–23)
BUN/CREAT SERPL: 14.4 (ref 7–25)
BUN/CREAT SERPL: 14.4 (ref 7–25)
CALCIUM SPEC-SCNC: 9.2 MG/DL (ref 8.6–10.5)
CALCIUM SPEC-SCNC: 9.2 MG/DL (ref 8.6–10.5)
CHLORIDE SERPL-SCNC: 107 MMOL/L (ref 98–107)
CHLORIDE SERPL-SCNC: 107 MMOL/L (ref 98–107)
CO2 SERPL-SCNC: 26 MMOL/L (ref 22–29)
CO2 SERPL-SCNC: 26 MMOL/L (ref 22–29)
CREAT SERPL-MCNC: 0.97 MG/DL (ref 0.76–1.27)
CREAT SERPL-MCNC: 0.97 MG/DL (ref 0.76–1.27)
DEPRECATED RDW RBC AUTO: 38.6 FL (ref 37–54)
DEPRECATED RDW RBC AUTO: 38.6 FL (ref 37–54)
EGFRCR SERPLBLD CKD-EPI 2021: 80.9 ML/MIN/1.73
EGFRCR SERPLBLD CKD-EPI 2021: 80.9 ML/MIN/1.73
ERYTHROCYTE [DISTWIDTH] IN BLOOD BY AUTOMATED COUNT: 12.7 % (ref 12.3–15.4)
ERYTHROCYTE [DISTWIDTH] IN BLOOD BY AUTOMATED COUNT: 12.7 % (ref 12.3–15.4)
GLUCOSE SERPL-MCNC: 136 MG/DL (ref 65–99)
GLUCOSE SERPL-MCNC: 136 MG/DL (ref 65–99)
HCT VFR BLD AUTO: 36.7 % (ref 37.5–51)
HCT VFR BLD AUTO: 36.7 % (ref 37.5–51)
HGB BLD-MCNC: 12.1 G/DL (ref 13–17.7)
HGB BLD-MCNC: 12.1 G/DL (ref 13–17.7)
MCH RBC QN AUTO: 27.4 PG (ref 26.6–33)
MCH RBC QN AUTO: 27.4 PG (ref 26.6–33)
MCHC RBC AUTO-ENTMCNC: 33 G/DL (ref 31.5–35.7)
MCHC RBC AUTO-ENTMCNC: 33 G/DL (ref 31.5–35.7)
MCV RBC AUTO: 83 FL (ref 79–97)
MCV RBC AUTO: 83 FL (ref 79–97)
PLATELET # BLD AUTO: 198 10*3/MM3 (ref 140–450)
PLATELET # BLD AUTO: 198 10*3/MM3 (ref 140–450)
PMV BLD AUTO: 10 FL (ref 6–12)
PMV BLD AUTO: 10 FL (ref 6–12)
POTASSIUM SERPL-SCNC: 3.8 MMOL/L (ref 3.5–5.2)
POTASSIUM SERPL-SCNC: 3.8 MMOL/L (ref 3.5–5.2)
RBC # BLD AUTO: 4.42 10*6/MM3 (ref 4.14–5.8)
RBC # BLD AUTO: 4.42 10*6/MM3 (ref 4.14–5.8)
SODIUM SERPL-SCNC: 142 MMOL/L (ref 136–145)
SODIUM SERPL-SCNC: 142 MMOL/L (ref 136–145)
WBC NRBC COR # BLD AUTO: 5.87 10*3/MM3 (ref 3.4–10.8)
WBC NRBC COR # BLD AUTO: 5.87 10*3/MM3 (ref 3.4–10.8)

## 2024-09-28 PROCEDURE — G0378 HOSPITAL OBSERVATION PER HR: HCPCS

## 2024-09-28 PROCEDURE — 80048 BASIC METABOLIC PNL TOTAL CA: CPT | Performed by: STUDENT IN AN ORGANIZED HEALTH CARE EDUCATION/TRAINING PROGRAM

## 2024-09-28 PROCEDURE — 85027 COMPLETE CBC AUTOMATED: CPT | Performed by: STUDENT IN AN ORGANIZED HEALTH CARE EDUCATION/TRAINING PROGRAM

## 2024-09-28 PROCEDURE — 97161 PT EVAL LOW COMPLEX 20 MIN: CPT

## 2024-09-28 RX ORDER — MECLIZINE HYDROCHLORIDE 25 MG/1
25 TABLET ORAL 3 TIMES DAILY PRN
Qty: 28 TABLET | Refills: 0 | Status: SHIPPED | OUTPATIENT
Start: 2024-09-28

## 2024-09-28 RX ADMIN — TAMSULOSIN HYDROCHLORIDE 0.4 MG: 0.4 CAPSULE ORAL at 09:17

## 2024-09-28 RX ADMIN — PANTOPRAZOLE SODIUM 40 MG: 40 TABLET, DELAYED RELEASE ORAL at 05:20

## 2024-09-28 RX ADMIN — Medication 10 ML: at 09:17

## 2024-09-28 NOTE — DISCHARGE INSTRUCTIONS
Take the prescribed meclizine 25 mg every 6 hours as needed for vertigo.  Follow-up with the physical therapy on an outpatient basis as needed for further vestibular therapy.  Follow-up closely with your primary care physician.  Return to the ED with any recurrence of symptoms, worsening symptoms, development of new symptoms we did not address at this visit, or should you have any further concerns.

## 2024-09-28 NOTE — DISCHARGE SUMMARY
ED OBSERVATION PROGRESS/DISCHARGE SUMMARY    Date of Admission: 9/27/2024   LOS: 0 days   PCP: Gordon Almonte MD      Subjective: Positional vertigo    Hospital Outcome:   74-year-old male with no significant past medical history who was admitted the observation unit for further evaluation of dizziness with nausea and vomiting.  In the ER, workup unremarkable other than a glucose at 114.  Symptoms intractable in the ED.    MRI of the brain without shows no acute intracranial abnormality.  Patient treated symptomatically for vertigo with antiemetics, IV fluids.  PT consulted for vestibular therapy.    ROS:  General: no fevers, chills  Respiratory: no cough, dyspnea  Cardiovascular: no chest pain, palpitations  Abdomen: No abdominal pain, nausea, vomiting, or diarrhea  Neurologic: No focal weakness    9/28/2024.    9:19 AM.  Patient states he is now asymptomatic.  MRI brain without contrast shows no acute intracranial abnormality.  Physical therapy has evaluated the patient.  He has been up and ambulating without any further symptoms.  He states his wife is in the hospital and he wants to be discharged at this time to go be with his spouse.  Will write for a short course of meclizine and set the patient up with vestibular therapy on an outpatient basis so he is follow-up should the symptoms recur.  Lab follow-up closely with his primary care physician.  Will have him return to the ER with any worsening symptoms, development of new symptoms we did not address, or should he have any further concerns.    Objective   Physical Exam:  I have reviewed the vital signs.  Temp:  [96.8 °F (36 °C)-97.7 °F (36.5 °C)] 97.6 °F (36.4 °C)  Heart Rate:  [] 87  Resp:  [17-20] 20  BP: (120-144)/() 120/72  General Appearance:    Alert, cooperative, no distress  Head:    Normocephalic, atraumatic  Eyes:    Sclerae anicteric  Neck:   Supple, no mass  Lungs: Clear to auscultation bilaterally, respirations unlabored  Heart:  Regular rate and rhythm, S1 and S2 normal, no murmur, rub or gallop  Abdomen:  Soft, nontender, bowel sounds active, nondistended  Extremities: No clubbing, cyanosis, or edema to lower extremities  Pulses:  2+ and symmetric in distal lower extremities  Skin: No rashes   Neurologic: Oriented x3, Normal strength to extremities    Results Review:    I have reviewed the labs, radiology results and diagnostic studies.    Results from last 7 days   Lab Units 09/28/24  0336   WBC 10*3/mm3 5.87   HEMOGLOBIN g/dL 12.1*   HEMATOCRIT % 36.7*   PLATELETS 10*3/mm3 198     Results from last 7 days   Lab Units 09/28/24  0336 09/27/24  1113   SODIUM mmol/L 142 139   POTASSIUM mmol/L 3.8 4.3   CHLORIDE mmol/L 107 103   CO2 mmol/L 26.0 24.1   BUN mg/dL 14 14   CREATININE mg/dL 0.97 1.09   CALCIUM mg/dL 9.2 9.8   BILIRUBIN mg/dL  --  0.6   ALK PHOS U/L  --  97   ALT (SGPT) U/L  --  13   AST (SGOT) U/L  --  15   GLUCOSE mg/dL 136* 114*     Imaging Results (Last 24 Hours)       Procedure Component Value Units Date/Time    MRI Brain Without Contrast [[id number]] Collected: 09/28/24 0148     Updated: 09/28/24 0201    Narrative:      BRAIN MRI WITHOUT CONTRAST     HISTORY: dizziness with peripheral nystagmus; R42-Dizziness and  giddiness; R11.2-Nausea with vomiting, unspecified     COMPARISON: None.     FINDINGS:  Multiplanar images of the head were obtained without  gadolinium. No areas of restricted diffusion are seen to suggest acute  infarct. There is atrophy. There is periventricular and deep white  matter microangiopathic change. There is no midline shift or mass  effect. Intracranial flow voids appear intact. Small focus of  susceptibility artifact is noted within the left lyndsey. It may be related  to chronic hemosiderin deposition. Mucosal thickening is noted within  the paranasal sinuses.       Impression:      1. No acute intracranial abnormality.     This report was finalized on 9/28/2024 1:58 AM by Dr. Coretta Armijo M.D on  Workstation: [id number]               I have reviewed the medications.  ---------------------------------------------------------------------------------------------  Assessment & Plan   Assessment/Problem List    Dizziness      Plan:    Vertigo  -MRI brain without negative acute  -Antiemetics as needed  -Meclizine as needed  -Migraine cocktail with IV Compazine, Benadryl, magnesium sulfate given  -PT for vestibular therapy  -Neurochecks every 4 hours  -Telemetry monitoring  -Patient asymptomatic.  Physical therapy is seen.  Plan as above.      Disposition: Discharge    Follow-up after Discharge: Primary care, vestibular PT    This note will serve as discharge summary and progress note      30 minutes have been spent by [facility] providers in the care of this patient while under observation status.    Appropriate PPE worn during patient encounter.  Hand hygeine performed before and after seeing the patient.      Geraldo Coleman III, PA 09/28/24 09:21 EDT

## 2024-09-28 NOTE — PROGRESS NOTES
MD ATTESTATION NOTE - Observation progress    The MELISA and I have discussed this patient's history, physical exam, and treatment plan.  I have reviewed the documentation and personally had a face to face interaction with the patient. I affirm the documentation and agree with the treatment and plan.  The attached note describes my personal findings.        SHARED APC FACE TO FACE: I performed a substantive part of the MDM during the patient's E/M visit. I personally evaluated and examined the patient. I personally made or approved the documented management plan and acknowledge its risk of complications.        Brief HPI: Patient states he feels better this morning.  States vertigo significantly improved.  Patient is able to ambulate without difficulty.  MRI last night showed no acute abnormality.  Has had no focal weakness or numbness.  Has required no vertigo meds since yesterday            GENERAL: no acute distress  HENT: nares patent  EYES: no scleral icterus  CV: regular rhythm, normal rate  RESPIRATORY: normal effort  ABDOMEN: soft  MUSCULOSKELETAL: no deformity  NEURO: alert, moves all extremities, follows commands  PSYCH:  calm, cooperative  SKIN: warm, dry    Vital signs and nursing notes reviewed.        Plan: Vestibular PT. symptom control

## 2024-09-28 NOTE — THERAPY DISCHARGE NOTE
Patient Name: Omid Manning  : 1950    MRN: [id number]                              Today's Date: 2024       Admit Date: 2024    Visit Dx:     ICD-10-CM ICD-9-CM   1. Dizziness  R42 780.4   2. Nausea and vomiting, unspecified vomiting type  R11.2 787.01     Patient Active Problem List   Diagnosis    Dizziness     Past Medical History:   Diagnosis Date    Vertigo      History reviewed. No pertinent surgical history.   General Information       Row Name 24 1152          Physical Therapy Time and Intention    Document Type discharge evaluation/summary  -MS     Mode of Treatment physical therapy;individual therapy  -MS       Row Name 24 1152          General Information    Patient Profile Reviewed yes  -MS     Prior Level of Function independent:  -MS     Barriers to Rehab none identified  -MS       Row Name 24 1152          Cognition    Orientation Status (Cognition) oriented x 3  -MS       Row Name 24 1152          Safety Issues, Functional Mobility    Comment, Safety Issues/Impairments (Mobility) Gait belt used for safety.  -MS               User Key  (r) = Recorded By, (t) = Taken By, (c) = Cosigned By      Initials Name Provider Type    MS Santy Wing, PT Physical Therapist                   Mobility       Row Name 24 1152          Bed Mobility    Bed Mobility supine-sit;sit-supine  -MS     Supine-Sit Preston (Bed Mobility) independent  -MS     Sit-Supine [place] (Bed Mobility) independent  -MS       Row Name 24 1152          Sit-Stand Transfer    Sit-Stand Preston (Transfers) independent  -MS       Row Name 24 1152          Gait/Stairs (Locomotion)    Preston Level (Gait) independent  -MS     Distance in Feet (Gait) 200  -MS     Comment, (Gait/Stairs) no balance deficits and no c/o dizziness throughout mobility this date.  -MS               User Key  (r) = Recorded By, (t) = Taken By, (c) = Cosigned By      Initials Name  Provider Type    Santy Mobley, PT Physical Therapist                   Obj/Interventions       Row Name 09/28/24 1153          Range of Motion Comprehensive    Comment, General Range of Motion B Shld (imp. 25%); BLE (WFL's);  h/o chronic shoulder issues and this is normal for patient per his report  -MS       Row Name 09/28/24 1153          Strength Comprehensive (MMT)    Comment, General Manual Muscle Testing (MMT) Assessment B Shld (3-/5); BLE (4/5)  -MS               User Key  (r) = Recorded By, (t) = Taken By, (c) = Cosigned By      Initials Name Provider Type    MS WingSanty, PT Physical Therapist                   Goals/Plan    No documentation.                  Clinical Impression       Row Name 09/28/24 1153          Pain    Pretreatment Pain Rating 0/10 - no pain  -MS     Posttreatment Pain Rating 0/10 - no pain  -MS       Row Name 09/28/24 1153          Plan of Care Review    Plan of Care Reviewed With patient  -MS       Row Name 09/28/24 1153          Therapy Assessment/Plan (PT)    Criteria for Skilled Interventions Met (PT) no problems identified which require skilled intervention  -MS       Row Name 09/28/24 1153          Positioning and Restraints    Pre-Treatment Position in bed  -MS     Post Treatment Position bed  -MS     In Bed notified nsg;sitting EOB;call light within reach;encouraged to call for assist;with nsg  -MS               User Key  (r) = Recorded By, (t) = Taken By, (c) = Cosigned By      Initials Name Provider Type    MS Wing Santy NERI, PT Physical Therapist                   Outcome Measures       Row Name 09/28/24 1154 09/28/24 0800       How much help from another person do you currently need...    Turning from your back to your side while in flat bed without using bedrails? 4  -MS 4  -KAREN    Moving from lying on back to sitting on the side of a flat bed without bedrails? 4  -MS 4  -KAREN    Moving to and from a bed to a chair (including a wheelchair)? 4  -MS 4  -KAREN     Standing up from a chair using your arms (e.g., wheelchair, bedside chair)? 4  -MS 4  -KAREN    Climbing 3-5 steps with a railing? 4  -MS 4  -KAREN    To walk in hospital room? 4  -MS 4  -KAREN    AM-PAC 6 Clicks Score (PT) 24  -MS 24  -KAREN    Highest Level of Mobility Goal 8 --> Walked 250 feet or more  -MS 8 --> Walked 250 feet or more  -KAREN      Row Name 09/28/24 1154          Functional Assessment    Outcome Measure Options AM-PAC 6 Clicks Basic Mobility (PT)  -MS               User Key  (r) = Recorded By, (t) = Taken By, (c) = Cosigned By      Initials Name Provider Type    Santy Mobley, PT Physical Therapist    Joaquim Reyes, RN Registered Nurse                  Physical Therapy Education       Title: PT OT SLP Therapies (Resolved)       Topic: Physical Therapy (Resolved)       Point: Mobility training (Resolved)       Learning Progress Summary             Patient Acceptance, E,D, VU,DU by MS at 9/28/2024 1154                         Point: Body mechanics (Resolved)       Learning Progress Summary             Patient Acceptance, E,D, VU,DU by MS at 9/28/2024 1154                         Point: Precautions (Resolved)       Learning Progress Summary             Patient Acceptance, E,D, VU,DU by MS at 9/28/2024 1154                                         User Key       Initials Effective Dates Name Provider Type Discipline    MS 06/16/21 -  Santy Wing, PT Physical Therapist PT                  PT Recommendation and Plan     Plan of Care Reviewed With: patient     Time Calculation:         PT Charges       Row Name 09/28/24 1155             Time Calculation    Start Time 0915  -MS      Stop Time 0926  -MS      Time Calculation (min) 11 min  -MS      PT Received On 09/28/24  -MS         Time Calculation- PT    Total Timed Code Minutes- PT 10 minute(s)  -MS                User Key  (r) = Recorded By, (t) = Taken By, (c) = Cosigned By      Initials Name Provider Type    Santy Mobley, PT Physical  Therapist                  Therapy Charges for Today       Code Description Service Date Service Provider Modifiers Qty    [id number] HC PT EVAL LOW COMPLEXITY 2 9/28/2024 Santy Wing, PT GP 1            PT G-Codes  Outcome Measure Options: AM-PAC 6 Clicks Basic Mobility (PT)  AM-PAC 6 Clicks Score (PT): 24    PT Discharge Summary  Anticipated Discharge Disposition (PT): home  Reason for Discharge: Independent, At baseline function  Discharge Destination: Home    Santy Wing, PT  9/28/2024        Interpolation Flap Text: A decision was made to reconstruct the defect utilizing an interpolation axial flap and a staged reconstruction.  A telfa template was made of the defect.  This telfa template was then used to outline the interpolation flap.  The donor area for the pedicle flap was then injected with anesthesia.  The flap was excised through the skin and subcutaneous tissue down to the layer of the underlying musculature.  The interpolation flap was carefully excised within this deep plane to maintain its blood supply.  The edges of the donor site were undermined.   The donor site was closed in a primary fashion.  The pedicle was then rotated into position and sutured.  Once the tube was sutured into place, adequate blood supply was confirmed with blanching and refill.  The pedicle was then wrapped with xeroform gauze and dressed appropriately with a telfa and gauze bandage to ensure continued blood supply and protect the attached pedicle.

## 2024-09-28 NOTE — PLAN OF CARE
Goal Outcome Evaluation:  Plan of Care Reviewed With: patient      Pt. is currently independent with functional mobility and has no further questions/concerns regarding functional mobility or home safety.  Patient with no c/o dizziness throughout P.T. evaluation. Encouraged pt. to continue ambulation with nursing staff while here in the hospital.  Otherwise, P.T. will sign off.             Anticipated Discharge Disposition (PT): home

## 2024-09-28 NOTE — PLAN OF CARE
Goal Outcome Evaluation:              Outcome Evaluation: Patient is alert and oriented times 4, on room and standby assist. MRI brain done, result still pending. PT consult today.

## 2024-09-28 NOTE — PLAN OF CARE
Goal Outcome Evaluation:   Patient has been discharged to a private vehicle off the unit. IV removed; belongings taken by the patient. Discharge and follow up info was shared with patient and a verbal understanding was relayed back to the nurse.

## 2025-04-07 ENCOUNTER — APPOINTMENT (OUTPATIENT)
Dept: CT IMAGING | Facility: HOSPITAL | Age: 75
End: 2025-04-07
Payer: MEDICARE

## 2025-04-07 ENCOUNTER — APPOINTMENT (OUTPATIENT)
Dept: GENERAL RADIOLOGY | Facility: HOSPITAL | Age: 75
End: 2025-04-07
Payer: MEDICARE

## 2025-04-07 ENCOUNTER — HOSPITAL ENCOUNTER (OUTPATIENT)
Facility: HOSPITAL | Age: 75
Setting detail: OBSERVATION
Discharge: HOME | End: 2025-04-08
Attending: STUDENT IN AN ORGANIZED HEALTH CARE EDUCATION/TRAINING PROGRAM | Admitting: EMERGENCY MEDICINE
Payer: MEDICARE

## 2025-04-07 DIAGNOSIS — R07.9 CHEST PAIN, UNSPECIFIED TYPE: Primary | ICD-10-CM

## 2025-04-07 DIAGNOSIS — R91.1 PULMONARY NODULE: ICD-10-CM

## 2025-04-07 LAB
ALBUMIN SERPL-MCNC: 4.3 G/DL (ref 3.5–5.2)
ALBUMIN/GLOB SERPL: 1.5 G/DL
ALP SERPL-CCNC: 94 U/L (ref 39–117)
ALT SERPL W P-5'-P-CCNC: 14 U/L (ref 1–41)
ANION GAP SERPL CALCULATED.3IONS-SCNC: 10.4 MMOL/L (ref 5–15)
AST SERPL-CCNC: 17 U/L (ref 1–40)
BASOPHILS # BLD AUTO: 0.04 10*3/MM3 (ref 0–0.2)
BASOPHILS NFR BLD AUTO: 0.6 % (ref 0–1.5)
BILIRUB SERPL-MCNC: 0.5 MG/DL (ref 0–1.2)
BUN SERPL-MCNC: 15 MG/DL (ref 8–23)
BUN/CREAT SERPL: 14.4 (ref 7–25)
CALCIUM SPEC-SCNC: 9.7 MG/DL (ref 8.6–10.5)
CHLORIDE SERPL-SCNC: 104 MMOL/L (ref 98–107)
CO2 SERPL-SCNC: 24.6 MMOL/L (ref 22–29)
CREAT SERPL-MCNC: 1.04 MG/DL (ref 0.76–1.27)
DEPRECATED RDW RBC AUTO: 40.8 FL (ref 37–54)
EGFRCR SERPLBLD CKD-EPI 2021: 74.4 ML/MIN/1.73
EOSINOPHIL # BLD AUTO: 0.25 10*3/MM3 (ref 0–0.4)
EOSINOPHIL NFR BLD AUTO: 3.7 % (ref 0.3–6.2)
ERYTHROCYTE [DISTWIDTH] IN BLOOD BY AUTOMATED COUNT: 13.2 % (ref 12.3–15.4)
GEN 5 1HR TROPONIN T REFLEX: 11 NG/L
GLOBULIN UR ELPH-MCNC: 2.8 GM/DL
GLUCOSE SERPL-MCNC: 96 MG/DL (ref 65–99)
HCT VFR BLD AUTO: 42.1 % (ref 37.5–51)
HGB BLD-MCNC: 13.8 G/DL (ref 13–17.7)
HOLD SPECIMEN: NORMAL
HOLD SPECIMEN: NORMAL
IMM GRANULOCYTES # BLD AUTO: 0.02 10*3/MM3 (ref 0–0.05)
IMM GRANULOCYTES NFR BLD AUTO: 0.3 % (ref 0–0.5)
LYMPHOCYTES # BLD AUTO: 1.58 10*3/MM3 (ref 0.7–3.1)
LYMPHOCYTES NFR BLD AUTO: 23.1 % (ref 19.6–45.3)
MCH RBC QN AUTO: 27.6 PG (ref 26.6–33)
MCHC RBC AUTO-ENTMCNC: 32.8 G/DL (ref 31.5–35.7)
MCV RBC AUTO: 84.2 FL (ref 79–97)
MONOCYTES # BLD AUTO: 0.4 10*3/MM3 (ref 0.1–0.9)
MONOCYTES NFR BLD AUTO: 5.8 % (ref 5–12)
NEUTROPHILS NFR BLD AUTO: 4.55 10*3/MM3 (ref 1.7–7)
NEUTROPHILS NFR BLD AUTO: 66.5 % (ref 42.7–76)
NRBC BLD AUTO-RTO: 0 /100 WBC (ref 0–0.2)
PLATELET # BLD AUTO: 262 10*3/MM3 (ref 140–450)
PMV BLD AUTO: 9.7 FL (ref 6–12)
POTASSIUM SERPL-SCNC: 4.5 MMOL/L (ref 3.5–5.2)
PROT SERPL-MCNC: 7.1 G/DL (ref 6–8.5)
RBC # BLD AUTO: 5 10*6/MM3 (ref 4.14–5.8)
SODIUM SERPL-SCNC: 139 MMOL/L (ref 136–145)
TROPONIN T NUMERIC DELTA: -1 NG/L
TROPONIN T SERPL HS-MCNC: 12 NG/L
WBC NRBC COR # BLD AUTO: 6.84 10*3/MM3 (ref 3.4–10.8)
WHOLE BLOOD HOLD COAG: NORMAL
WHOLE BLOOD HOLD SPECIMEN: NORMAL

## 2025-04-07 PROCEDURE — 71275 CT ANGIOGRAPHY CHEST: CPT

## 2025-04-07 PROCEDURE — 85025 COMPLETE CBC W/AUTO DIFF WBC: CPT

## 2025-04-07 PROCEDURE — 96374 THER/PROPH/DIAG INJ IV PUSH: CPT

## 2025-04-07 PROCEDURE — G0378 HOSPITAL OBSERVATION PER HR: HCPCS

## 2025-04-07 PROCEDURE — 93005 ELECTROCARDIOGRAM TRACING: CPT

## 2025-04-07 PROCEDURE — 84484 ASSAY OF TROPONIN QUANT: CPT | Performed by: STUDENT IN AN ORGANIZED HEALTH CARE EDUCATION/TRAINING PROGRAM

## 2025-04-07 PROCEDURE — 99285 EMERGENCY DEPT VISIT HI MDM: CPT

## 2025-04-07 PROCEDURE — 93005 ELECTROCARDIOGRAM TRACING: CPT | Performed by: STUDENT IN AN ORGANIZED HEALTH CARE EDUCATION/TRAINING PROGRAM

## 2025-04-07 PROCEDURE — 36415 COLL VENOUS BLD VENIPUNCTURE: CPT

## 2025-04-07 PROCEDURE — 71045 X-RAY EXAM CHEST 1 VIEW: CPT

## 2025-04-07 PROCEDURE — 96375 TX/PRO/DX INJ NEW DRUG ADDON: CPT

## 2025-04-07 PROCEDURE — 25010000002 FAMOTIDINE 10 MG/ML SOLUTION: Performed by: NURSE PRACTITIONER

## 2025-04-07 PROCEDURE — 25510000001 IOPAMIDOL PER 1 ML: Performed by: STUDENT IN AN ORGANIZED HEALTH CARE EDUCATION/TRAINING PROGRAM

## 2025-04-07 PROCEDURE — 93010 ELECTROCARDIOGRAM REPORT: CPT | Performed by: INTERNAL MEDICINE

## 2025-04-07 PROCEDURE — 80053 COMPREHEN METABOLIC PANEL: CPT | Performed by: STUDENT IN AN ORGANIZED HEALTH CARE EDUCATION/TRAINING PROGRAM

## 2025-04-07 RX ORDER — SODIUM CHLORIDE 9 MG/ML
40 INJECTION, SOLUTION INTRAVENOUS AS NEEDED
Status: DISCONTINUED | OUTPATIENT
Start: 2025-04-07 | End: 2025-04-08 | Stop reason: HOSPADM

## 2025-04-07 RX ORDER — ALUMINA, MAGNESIA, AND SIMETHICONE 2400; 2400; 240 MG/30ML; MG/30ML; MG/30ML
15 SUSPENSION ORAL ONCE
Status: COMPLETED | OUTPATIENT
Start: 2025-04-07 | End: 2025-04-07

## 2025-04-07 RX ORDER — ASPIRIN 325 MG
325 TABLET ORAL ONCE
Status: COMPLETED | OUTPATIENT
Start: 2025-04-07 | End: 2025-04-07

## 2025-04-07 RX ORDER — SODIUM CHLORIDE 0.9 % (FLUSH) 0.9 %
10 SYRINGE (ML) INJECTION AS NEEDED
Status: DISCONTINUED | OUTPATIENT
Start: 2025-04-07 | End: 2025-04-08 | Stop reason: HOSPADM

## 2025-04-07 RX ORDER — SODIUM CHLORIDE 0.9 % (FLUSH) 0.9 %
10 SYRINGE (ML) INJECTION EVERY 12 HOURS SCHEDULED
Status: DISCONTINUED | OUTPATIENT
Start: 2025-04-07 | End: 2025-04-08 | Stop reason: HOSPADM

## 2025-04-07 RX ORDER — PANTOPRAZOLE SODIUM 40 MG/10ML
80 INJECTION, POWDER, LYOPHILIZED, FOR SOLUTION INTRAVENOUS ONCE
Status: COMPLETED | OUTPATIENT
Start: 2025-04-07 | End: 2025-04-07

## 2025-04-07 RX ORDER — ACETAMINOPHEN 325 MG/1
650 TABLET ORAL EVERY 6 HOURS PRN
Status: DISCONTINUED | OUTPATIENT
Start: 2025-04-07 | End: 2025-04-08 | Stop reason: HOSPADM

## 2025-04-07 RX ORDER — FAMOTIDINE 10 MG/ML
20 INJECTION, SOLUTION INTRAVENOUS EVERY 12 HOURS SCHEDULED
Status: DISCONTINUED | OUTPATIENT
Start: 2025-04-07 | End: 2025-04-08 | Stop reason: HOSPADM

## 2025-04-07 RX ORDER — TAMSULOSIN HYDROCHLORIDE 0.4 MG/1
0.4 CAPSULE ORAL NIGHTLY
Status: DISCONTINUED | OUTPATIENT
Start: 2025-04-08 | End: 2025-04-08 | Stop reason: HOSPADM

## 2025-04-07 RX ORDER — IOPAMIDOL 755 MG/ML
100 INJECTION, SOLUTION INTRAVASCULAR
Status: COMPLETED | OUTPATIENT
Start: 2025-04-07 | End: 2025-04-07

## 2025-04-07 RX ADMIN — ASPIRIN 325 MG ORAL TABLET 325 MG: 325 PILL ORAL at 14:00

## 2025-04-07 RX ADMIN — IOPAMIDOL 95 ML: 755 INJECTION, SOLUTION INTRAVENOUS at 15:01

## 2025-04-07 RX ADMIN — ALUMINUM HYDROXIDE, MAGNESIUM HYDROXIDE, AND DIMETHICONE 15 ML: 400; 400; 40 SUSPENSION ORAL at 14:02

## 2025-04-07 RX ADMIN — ACETAMINOPHEN 650 MG: 325 TABLET, FILM COATED ORAL at 21:18

## 2025-04-07 RX ADMIN — FAMOTIDINE 20 MG: 10 INJECTION, SOLUTION INTRAVENOUS at 20:58

## 2025-04-07 RX ADMIN — Medication 10 ML: at 20:59

## 2025-04-07 RX ADMIN — PANTOPRAZOLE SODIUM 80 MG: 40 INJECTION, POWDER, FOR SOLUTION INTRAVENOUS at 14:09

## 2025-04-07 NOTE — PROGRESS NOTES
Clinical Pharmacy Services: Medication History    Feliciano Noguera is a 74 y.o. male presenting to [facility] for   Chief Complaint   Patient presents with    Chest Pain       He  has a past medical history of Arthritis, Enlarged prostate, GERD (gastroesophageal reflux disease), Hughes (hard of hearing), Knee pain, left, Left shoulder pain, and Vertigo.    Allergies as of 04/07/2025    (No Known Allergies)       Medication information was obtained from: Patient   Pharmacy and Phone Number:     Prior to Admission Medications       Prescriptions Last Dose Informant Patient Reported? Taking?    meclizine (ANTIVERT) 25 MG tablet  Self No Yes    Take 1 tablet by mouth 3 (Three) Times a Day As Needed for Dizziness or Nausea.    naproxen (NAPROSYN) 500 MG tablet  Self Yes Yes    Take 1 tablet by mouth 2 (Two) Times a Day As Needed for Mild Pain.    omeprazole (priLOSEC) 40 MG capsule  Self Yes Yes    Take 1 capsule by mouth Daily.    tamsulosin (FLOMAX) 0.4 MG capsule 24 hr capsule  Self Yes Yes    Take 1 capsule by mouth Daily.              Medication notes:     This medication list is complete to the best of my knowledge as of 4/7/2025    Please call if questions.    Tiffanie Dyer  Medication History Technician   [phone]    4/7/2025 16:00 EDT

## 2025-04-07 NOTE — ED PROVIDER NOTES
EMERGENCY DEPARTMENT ENCOUNTER  Room Number:  31/31  PCP: Daniel Mahan MD  Independent Historians: Patient      HPI:  Chief Complaint: had concerns including Chest Pain.       Context: Feliciano Noguera is a 74 y.o. male with a medical history of vertigo, arthritis, GERD who presents to the ED c/o acute chest pain.  Patient describes a burning sensation that begins in the epigastrium and radiates up through the chest into the left chest and left arm.  Patient states he has not been eating much lately due to discomfort when he eats.  Patient denies previous cardiac history.  Patient states symptoms been occurring for approximately 1 week and are intermittent in nature.  Patient is currently experiencing a burning sensation      Review of prior external notes (non-ED) -and- Review of prior external test results outside of this encounter: Discharge summary from 9/28/2024 reviewed and notable for admission secondary to dizziness with associated nausea and vomiting.  Patient had negative MRI patient was treated symptomatically for vertigo.  Patient eventually able to be discharged follow-up on outpatient basis.    Prescription drug monitoring program review:         PAST MEDICAL HISTORY  Active Ambulatory Problems     Diagnosis Date Noted    Precordial pain 06/28/2017    Fatigue 06/28/2017    Snoring 06/28/2017    Uncontrolled daytime somnolence 06/28/2017    Benign prostatic hyperplasia without lower urinary tract symptoms 06/28/2017    Tobacco abuse 06/28/2017    Right upper quadrant abdominal pain 08/03/2017    Hematochezia 11/06/2018    Epigastric pain 11/06/2018    Diarrhea 11/06/2018    OA (osteoarthritis) of knee 05/28/2019    Facial numbness 08/09/2021    Paresthesias 08/09/2021    Myelopathy 08/10/2021    Primary osteoarthritis of left knee 11/14/2023    S/P reverse total shoulder arthroplasty, left 01/17/2024    Dizziness 09/27/2024     Resolved Ambulatory Problems     Diagnosis Date Noted    Generalized  abdominal pain 06/28/2017    Hyperglycemia 06/28/2017    Calculus of gallbladder without cholecystitis without obstruction 07/28/2017    Sinus tachycardia 01/18/2024     Past Medical History:   Diagnosis Date    Arthritis     Enlarged prostate     GERD (gastroesophageal reflux disease)     [place] (hard of hearing)     Knee pain, left     Left shoulder pain     Vertigo          PAST SURGICAL HISTORY  Past Surgical History:   Procedure Laterality Date    CHOLECYSTECTOMY WITH INTRAOPERATIVE CHOLANGIOGRAM N/A 8/1/2017    Procedure: CHOLECYSTECTOMY LAPAROSCOPIC INTRAOPERATIVE CHOLANGIOGRAM;  Surgeon: Bola Patel MD;  Location: [facility];  Service:     COLONOSCOPY N/A 09/24/2009    Dr. Jamison Johnson    COLONOSCOPY N/A 11/14/2018    Procedure: COLONOSCOPY into cecum with biopsy;  Surgeon: Bola Patel MD;  Location: [facility] ENDOSCOPY;  Service: General    ENDOSCOPY N/A 12/15/2008    Dr. Jamison Johnson    ENDOSCOPY N/A 11/14/2018    Procedure: ESOPHAGOGASTRODUODENOSCOPY with biopsy;  Surgeon: Bola Patel MD;  Location: [facility] ENDOSCOPY;  Service: General    KNEE ARTHROSCOPY Left     PROSTATE BIOPSY      ROTATOR CUFF REPAIR Right 09/29/2009    Dr. Arik Harris    SHOULDER CLOSED REDUCTION Left 2/7/2024    Procedure: CLOSED REDUCTION INTERNAL FIXATION LEFT REVERSE SHOULDER ARTHROPLASTY;  Surgeon: Anoop Soliman MD;  Location: [facility] OR [facility];  Service: Orthopedics;  Laterality: Left;    TOTAL KNEE ARTHROPLASTY Right 5/28/2019    Procedure: RIGHT TOTAL KNEE ARTHROPLASTY;  Surgeon: Bola Ramires MD;  Location: [facility] MAIN OR;  Service: Orthopedics    TOTAL KNEE ARTHROPLASTY Left 11/14/2023    Procedure: LEFT TOTAL KNEE ARTHROPLASTY;  Surgeon: Bola Ramires MD;  Location: [facility];  Service: Orthopedics;  Laterality: Left;    TOTAL SHOULDER ARTHROPLASTY W/ DISTAL CLAVICLE EXCISION Left 1/17/2024    Procedure: TOTAL SHOULDER REVERSE ARTHROPLASTY;  Surgeon: Anoop Soliman MD;  Location:   [facility];  Service: Orthopedics;  Laterality: Left;         FAMILY HISTORY  Family History   Problem Relation Age of Onset    Diabetes Mother     Tuberculosis Father     Breast cancer Sister     Bone cancer Sister     Lung cancer Brother     Diabetes Brother     Heart disease Brother     Malig Hyperthermia Neg Hx          SOCIAL HISTORY  Social History     Socioeconomic History    Marital status:    Tobacco Use    Smoking status: Some Days     Types: Cigars     Passive exposure: Past    Smokeless tobacco: Never    Tobacco comments:     Smokes 1 cigar every day.   Vaping Use    Vaping status: Never Used   Substance and Sexual Activity    Alcohol use: Not Currently     Comment: RARELY    Drug use: Never    Sexual activity: Not Currently         ALLERGIES  Patient has no known allergies.      REVIEW OF SYSTEMS  Review of Systems  Included in HPI  All systems reviewed and negative except for those discussed in HPI.      PHYSICAL EXAM    I have reviewed the triage vital signs and nursing notes.    ED Triage Vitals   Temp Heart Rate Resp BP SpO2   04/07/25 1240 04/07/25 1240 04/07/25 1240 04/07/25 1248 04/07/25 1240   96 °F (35.6 °C) 88 16 136/85 98 %      Temp src Heart Rate Source Patient Position BP Location FiO2 (%)   04/07/25 1240 04/07/25 1240 04/07/25 1248 04/07/25 1248 --   Tympanic Monitor Sitting Left arm        Physical Exam  GENERAL: alert, no acute distress  SKIN: Warm, dry  HENT: Normocephalic, atraumatic  EYES: no scleral icterus  CV: regular rhythm, regular rate  RESPIRATORY: normal effort, lungs clear  ABDOMEN: soft, tenderness to palpation in the epigastrium  MUSCULOSKELETAL: no deformity  NEURO: alert, moves all extremities, follows commands            LAB RESULTS  Recent Results (from the past 24 hours)   ECG 12 Lead ED Triage Standing Order; Chest Pain    Collection Time: 04/07/25 12:44 PM   Result Value Ref Range    QT Interval 407 ms    QTC Interval 423 ms   Comprehensive Metabolic Panel     Collection Time: 04/07/25 12:45 PM    Specimen: Arm, Right; Blood   Result Value Ref Range    Glucose 96 65 - 99 mg/dL    BUN 15 8 - 23 mg/dL    Creatinine 1.04 0.76 - 1.27 mg/dL    Sodium 139 136 - 145 mmol/L    Potassium 4.5 3.5 - 5.2 mmol/L    Chloride 104 98 - 107 mmol/L    CO2 24.6 22.0 - 29.0 mmol/L    Calcium 9.7 8.6 - 10.5 mg/dL    Total Protein 7.1 6.0 - 8.5 g/dL    Albumin 4.3 3.5 - 5.2 g/dL    ALT (SGPT) 14 1 - 41 U/L    AST (SGOT) 17 1 - 40 U/L    Alkaline Phosphatase 94 39 - 117 U/L    Total Bilirubin 0.5 0.0 - 1.2 mg/dL    Globulin 2.8 gm/dL    A/G Ratio 1.5 g/dL    BUN/Creatinine Ratio 14.4 7.0 - 25.0    Anion Gap 10.4 5.0 - 15.0 mmol/L    eGFR 74.4 >60.0 mL/min/1.73   High Sensitivity Troponin T    Collection Time: 04/07/25 12:45 PM    Specimen: Arm, Right; Blood   Result Value Ref Range    HS Troponin T 12 <22 ng/L   Green Top (Gel)    Collection Time: 04/07/25 12:45 PM   Result Value Ref Range    Extra Tube Hold for add-ons.    Lavender Top    Collection Time: 04/07/25 12:45 PM   Result Value Ref Range    Extra Tube hold for add-on    Gold Top - SST    Collection Time: 04/07/25 12:45 PM   Result Value Ref Range    Extra Tube Hold for add-ons.    Light Blue Top    Collection Time: 04/07/25 12:45 PM   Result Value Ref Range    Extra Tube Hold for add-ons.    CBC Auto Differential    Collection Time: 04/07/25 12:45 PM    Specimen: Arm, Right; Blood   Result Value Ref Range    WBC 6.84 3.40 - 10.80 10*3/mm3    RBC 5.00 4.14 - 5.80 10*6/mm3    Hemoglobin 13.8 13.0 - 17.7 g/dL    Hematocrit 42.1 37.5 - 51.0 %    MCV 84.2 79.0 - 97.0 fL    MCH 27.6 26.6 - 33.0 pg    MCHC 32.8 31.5 - 35.7 g/dL    RDW 13.2 12.3 - 15.4 %    RDW-SD 40.8 37.0 - 54.0 fl    MPV 9.7 6.0 - 12.0 fL    Platelets 262 140 - 450 10*3/mm3    Neutrophil % 66.5 42.7 - 76.0 %    Lymphocyte % 23.1 19.6 - 45.3 %    Monocyte % 5.8 5.0 - 12.0 %    Eosinophil % 3.7 0.3 - 6.2 %    Basophil % 0.6 0.0 - 1.5 %    Immature Grans % 0.3 0.0 - 0.5 %     Neutrophils, Absolute 4.55 1.70 - 7.00 10*3/mm3    Lymphocytes, Absolute 1.58 0.70 - 3.10 10*3/mm3    Monocytes, Absolute 0.40 0.10 - 0.90 10*3/mm3    Eosinophils, Absolute 0.25 0.00 - 0.40 10*3/mm3    Basophils, Absolute 0.04 0.00 - 0.20 10*3/mm3    Immature Grans, Absolute 0.02 0.00 - 0.05 10*3/mm3    nRBC 0.0 0.0 - 0.2 /100 WBC   High Sensitivity Troponin T 1Hr    Collection Time: 04/07/25  2:09 PM    Specimen: Blood   Result Value Ref Range    HS Troponin T 11 <22 ng/L    Troponin T Numeric Delta -1 Abnormal if >/=3 ng/L         RADIOLOGY  CT Angiogram Chest  Result Date: 4/7/2025  CT ANGIOGRAM CHEST-  INDICATION: Rule out aortic pathology. Burning sensation.  COMPARISON: CTA chest August 3, 2017  TECHNIQUE: CTA chest with IV contrast. Coronal and sagittal reformats. Three dimensional reconstructions. Radiation dose reduction techniques were utilized, including automated exposure control and exposure modulation based on body size.  FINDINGS:  Chest wall: No lymphadenopathy. Fatty atrophy of the rotator cuff musculature.  Mediastinum: Coronary artery atherosclerotic calcification. Small aortic valve calcification. Heart is normal in size. No pericardial effusion. No thoracic aortic aneurysm or dissection. Atherosclerotic plaque seen in the central left subclavian artery, with 50% stenosis. No mediastinal or hilar lymphadenopathy. Calcified right hilar lymph nodes, consistent with prior granulomatous infection.  Lung/pleura: No effusions. Patent central airways. Small amount of cylindrical bronchiolectasis. Respiratory motion artifact. Small amount of posterior dependent and bibasilar subsegmental atelectasis or scarring. Solid pulmonary nodule in the lateral basilar left lower lobe, series 6, axial mage 120, measures 7 mm.  Upper abdomen: Cholecystectomy. Possible hepatic steatosis. Colonic diverticulosis. Small hiatal hernia. Incidental duodenal diverticulum.  Osseous structures: Right humeral head anchors.  Right glenohumeral osteoarthritis. Total left shoulder arthroplasty.       1. No thoracic aortic aneurysm or dissection. 2. Small aortic valve calcification. 3. Solid pulmonary nodule in the lateral segment left lower lobe measuring 7 mm. Recommend 6-month follow-up chest CT.    This report was finalized on 4/7/2025 3:30 PM by Dr. Feliciano Pagan M.D on Workstation: [id number]      XR Chest 1 View  Result Date: 4/7/2025  2 VIEW CHEST  HISTORY: Chest pain.  FINDINGS: The lungs are well expanded and clear and the heart and hilar structures are normal. There is no acute disease.  This report was finalized on 4/7/2025 1:44 PM by Dr. Eusebio Dunne M.D on Workstation: [id number]          MEDICATIONS GIVEN IN ER  Medications   sodium chloride 0.9 % flush 10 mL (has no administration in time range)   sodium chloride 0.9 % flush 10 mL (has no administration in time range)   sodium chloride 0.9 % flush 10 mL (has no administration in time range)   sodium chloride 0.9 % infusion 40 mL (has no administration in time range)   aspirin tablet 325 mg (325 mg Oral Given 4/7/25 1400)   aluminum-magnesium hydroxide-simethicone (MAALOX MAX) 400-400-40 MG/5ML suspension 15 mL (15 mL Oral Given 4/7/25 1402)   pantoprazole (PROTONIX) injection 80 mg (80 mg Intravenous Given 4/7/25 1409)   iopamidol (ISOVUE-370) 76 % injection 100 mL (95 mL Intravenous Given by Other 4/7/25 1501)         ORDERS PLACED DURING THIS VISIT:  Orders Placed This Encounter   Procedures    XR Chest 1 View    CT Angiogram Chest    [place] Draw    Comprehensive Metabolic Panel    High Sensitivity Troponin T    CBC Auto Differential    High Sensitivity Troponin T 1Hr    NPO Diet NPO Type: Strict NPO    Undress & Gown    Continuous Pulse Oximetry    Vital Signs    Intake & Output    Weigh Patient    Oral Care    Saline Lock & Maintain IV Access    Place Sequential Compression Device    Maintain Sequential Compression Device    Code Status and Medical Interventions:  CPR (Attempt to Resuscitate); Full Support    Inpatient Cardiology Consult    Oxygen Therapy- Nasal Cannula; Titrate 1-6 LPM Per SpO2; 90 - 95%    ECG 12 Lead ED Triage Standing Order; Chest Pain    ECG 12 Lead ED Triage Standing Order; Chest Pain    Insert Peripheral IV    Insert Peripheral IV    Initiate Emergency Department Observation Status    CBC & Differential    Green Top (Gel)    Lavender Top    Gold Top - SST    Light Blue Top         OUTPATIENT MEDICATION MANAGEMENT:  Current Facility-Administered Medications Ordered in Epic   Medication Dose Route Frequency Provider Last Rate Last Admin    sodium chloride 0.9 % flush 10 mL  10 mL Intravenous PRN Vineet Hooks MD        sodium chloride 0.9 % flush 10 mL  10 mL Intravenous Q12H Qadah, Abdalhakim, APRN        sodium chloride 0.9 % flush 10 mL  10 mL Intravenous PRN Qadah, Abdalhakim, APRN        sodium chloride 0.9 % infusion 40 mL  40 mL Intravenous PRN Qadah, Abdalhakim, APRN         Current Outpatient Medications Ordered in Epic   Medication Sig Dispense Refill    meclizine (ANTIVERT) 25 MG tablet Take 1 tablet by mouth 3 (Three) Times a Day As Needed for Dizziness or Nausea. 28 tablet 0    naproxen (NAPROSYN) 500 MG tablet Take 1 tablet by mouth 2 (Two) Times a Day As Needed for Mild Pain.      omeprazole (priLOSEC) 40 MG capsule Take 1 capsule by mouth Daily.      tamsulosin (FLOMAX) 0.4 MG capsule 24 hr capsule Take 1 capsule by mouth Daily.           PROCEDURES  Procedures            PROGRESS, DATA ANALYSIS, CONSULTS, AND MEDICAL DECISION MAKING  All labs have been independently interpreted by me.  All radiology studies have been reviewed by me. All EKG's have been independently viewed and interpreted by me.  Discussion below represents my analysis of pertinent findings related to patient's condition, differential diagnosis, treatment plan and final disposition.    Differential diagnosis includes but is not limited to ACS, aortic pathology,  reflux, PUD, esophagitis.    Clinical Scores:         HEART Score: 7   Shared Decision Making  I discussed the findings with the patient/patient representative who is in agreement with the treatment plan and the final disposition.  Risks and benefits of discharge and/or observation/admission were discussed: Yes                               ED Course as of 04/07/25 1603   Mon Apr 07, 2025   1310 EKG interpreted by me demonstrates sinus rhythm, rate of 67, no MA/QT prolongation, right bundle branch block, no ST elevation [MW]   1541 Chest x-ray interpreted by me and demonstrates no evidence of dense consolidation [MW]   1541 Workup in the emergency department is overall unremarkable including troponin of 12/11.  EKG is nonischemic, chest x-ray unremarkable.  CTA of the chest demonstrates no dissection.  Patient treated with Maalox and pantoprazole with resolution of burning sensation however he does continue to have a tightness through the left chest wall with radiation into the left arm.  Persistence of discomfort in the left side of the chest is concerning.  Will plan on admission for further cardiac evaluation.  Patient is agreeable with plan. [MW]   1542 Discussed with Aj[facility]o ATA with [facility] who agrees to admit [MW]   1602 Of note patient does have a pulmonary nodule which will require follow-up after cardiology evaluation and hospitalization. [MW]      ED Course User Index  [MW] Vineet Hooks MD             AS OF 16:03 EDT VITALS:    BP - 126/88  HR - 74  TEMP - 96 °F (35.6 °C) (Tympanic)  O2 SATS - 96%    COMPLEXITY OF CARE  The patient requires admission.      DIAGNOSIS  Final diagnoses:   Chest pain, unspecified type   Pulmonary nodule         DISPOSITION  ED Disposition       ED Disposition   Decision to Admit    Condition   --    Comment   --                Please note that portions of this document were completed with a voice recognition program.    Note Disclaimer: At [facility], we believe that  sharing information builds trust and better relationships. You are receiving this note because you recently visited [facility]. It is possible you will see health information before a provider has talked with you about it. This kind of information can be easy to misunderstand. To help you fully understand what it means for your health, we urge you to discuss this note with your provider.         Vineet Hooks MD  04/07/25 1540       Vineet Hooks MD  04/07/25 [id number]

## 2025-04-07 NOTE — PLAN OF CARE
Goal Outcome Evaluation:  Plan of Care Reviewed With: patient, spouse        Progress: no change  Outcome Evaluation: pt admitted for chest discomfort that radiates to his left shoulder and arm. Cardiology consult for AM. NPO at midnight.       Problem: Adult Inpatient Plan of Care  Goal: Plan of Care Review  Outcome: Progressing  Flowsheets (Taken 4/7/2025 1759)  Progress: no change  Outcome Evaluation: pt admitted for chest discomfort that radiates to his left shoulder and arm. Cardiology consult for AM. NPO at midnight.  Plan of Care Reviewed With:   patient   spouse  Goal: Patient-Specific Goal (Individualized)  Outcome: Progressing  Goal: Absence of Hospital-Acquired Illness or Injury  Outcome: Progressing  Intervention: Identify and Manage Fall Risk  Recent Flowsheet Documentation  Taken 4/7/2025 1735 by Nicolas Houser RN  Safety Promotion/Fall Prevention:   safety round/check completed   room organization consistent   lighting adjusted   nonskid shoes/slippers when out of bed   activity supervised   clutter free environment maintained  Taken 4/7/2025 1640 by Nicolas Houser RN  Safety Promotion/Fall Prevention:   safety round/check completed   room organization consistent   lighting adjusted   nonskid shoes/slippers when out of bed   activity supervised   clutter free environment maintained  Intervention: Prevent Skin Injury  Recent Flowsheet Documentation  Taken 4/7/2025 1735 by Nicolas Houser RN  Body Position: position changed independently  Taken 4/7/2025 1640 by Nicolas Houser RN  Body Position: position changed independently  Intervention: Prevent Infection  Recent Flowsheet Documentation  Taken 4/7/2025 1735 by Nicolas Houser RN  Infection Prevention:   rest/sleep promoted   single patient room provided  Taken 4/7/2025 1640 by Nicolas Houser RN  Infection Prevention:   rest/sleep promoted   single patient room provided  Goal: Optimal Comfort and Wellbeing  Outcome: Progressing  Intervention: Monitor Pain and  Promote Comfort  Recent Flowsheet Documentation  Taken 4/7/2025 1640 by Nicolas Houser, RN  Pain Management Interventions:   pillow support provided   position adjusted   quiet environment facilitated   no interventions per patient request  Intervention: Provide Person-Centered Care  Recent Flowsheet Documentation  Taken 4/7/2025 1640 by Nicolas Houser, RN  Trust Relationship/Rapport:   care explained   questions answered   empathic listening provided   thoughts/feelings acknowledged  Goal: Readiness for Transition of Care  Outcome: Progressing  Intervention: Mutually Develop Transition Plan  Recent Flowsheet Documentation  Taken 4/7/2025 1646 by Nicolas Houser, RN  Transportation Anticipated: family or friend will provide  Patient/Family Anticipated Services at Transition: none  Patient/Family Anticipates Transition to: home with family  Taken 4/7/2025 1645 by Nicolas Houser, RN  Equipment Currently Used at Home:   walker, rolling   cane, straight

## 2025-04-07 NOTE — H&P
[facility]   HISTORY AND PHYSICAL    Patient Name: Feliciano Noguera  : 1950  MRN: [id number]  Primary Care Physician:  Daniel Mahan MD  Date of admission: 2025    Subjective   Subjective     Chief Complaint: Chest pain    HPI:    Feliciano Noguera is a 74 y.o. male with PMH including but limited to GERD, arthritis, BPH presented to [facility] with epigastric pain since yesterday evening.  Pain is burning sensation that radiates into chest and throat that patient describes as choking sensation.  Additionally patient states that pain radiates into his left shoulder and arm.  Denies similar episodes in the past, history of CAD or DVT.  Patient states history of GERD and currently on PPI.  Denies associated nausea, vomiting, diaphoresis, shortness of breath or back pain.  Pain is nonexertional and nonpleuritic.  Denies fever or chills.    ED workup reviewed: High sensitive troponin 12 and 11, creatinine 1.04, WBC 6.84, hemoglobin 13.8 and platelets 262.  CTA chest shows no evidence of aortic aneurysm or dissection.  There is a small aortic valve calcification and pulmonary nodule in the lateral segment left lower lobe measuring 7 mm.  Discussed above findings with patient and encouraged him to follow-up with his PCP for repeat imaging within 6 months as recommended.  EKG showed sinus rhythm with PACs and RBBB    Review of Systems   All systems were reviewed and negative except for: The mentioned above in HPI    Personal History     Past Medical History:   Diagnosis Date    Arthritis     Enlarged prostate     GERD (gastroesophageal reflux disease)     [place]hard of hearing)     DEAF IN RIGHT EAR    Knee pain, left     KNEE REPLACED 2023    Left shoulder pain     Vertigo        Past Surgical History:   Procedure Laterality Date    CHOLECYSTECTOMY WITH INTRAOPERATIVE CHOLANGIOGRAM N/A 2017    Procedure: CHOLECYSTECTOMY LAPAROSCOPIC INTRAOPERATIVE CHOLANGIOGRAM;  Surgeon: Bola PHILLIPS  MD Amanda;  Location: [facility] OR [facility];  Service:     COLONOSCOPY N/A 09/24/2009    Dr. Jamison Johnson    COLONOSCOPY N/A 11/14/2018    Procedure: COLONOSCOPY into cecum with biopsy;  Surgeon: Bola Patel MD;  Location: [facility] ENDOSCOPY;  Service: General    ENDOSCOPY N/A 12/15/2008    Dr. Jamison Johnson    ENDOSCOPY N/A 11/14/2018    Procedure: ESOPHAGOGASTRODUODENOSCOPY with biopsy;  Surgeon: Bola Patel MD;  Location: [facility] ENDOSCOPY;  Service: General    KNEE ARTHROSCOPY Left     PROSTATE BIOPSY      ROTATOR CUFF REPAIR Right 09/29/2009    Dr. Arik Harris    SHOULDER CLOSED REDUCTION Left 2/7/2024    Procedure: CLOSED REDUCTION INTERNAL FIXATION LEFT REVERSE SHOULDER ARTHROPLASTY;  Surgeon: Anoop Soliman MD;  [place] [facility] [place] [facility] [place] [facility] [place]  Service: Orthopedics;  Laterality: Left;    TOTAL KNEE ARTHROPLASTY Right 5/28/2019    Procedure: RIGHT TOTAL KNEE ARTHROPLASTY;  Surgeon: Bola Ramires MD;  Location: [facility] MAIN OR;  Service: Orthopedics    TOTAL KNEE ARTHROPLASTY Left 11/14/2023    Procedure: LEFT TOTAL KNEE ARTHROPLASTY;  Surgeon: Bola Ramires MD;  [place] [facility] [place] [facility] [place] [facility] [place]  Service: Orthopedics;  Laterality: Left;    TOTAL SHOULDER ARTHROPLASTY W/ DISTAL CLAVICLE EXCISION Left 1/17/2024    Procedure: TOTAL SHOULDER REVERSE ARTHROPLASTY;  Surgeon: Anoop Soliman MD;  [place] [facility] [place] [facility] [place] [facility] [place]  Service: Orthopedics;  Laterality: Left;       Family History: family history includes Bone cancer in his sister; Breast cancer in his sister; Diabetes in his brother and mother; Heart disease in his brother; Lung cancer in his brother; Tuberculosis in his father. Otherwise pertinent FHx was reviewed and not pertinent to current issue.    Social History:  reports that he has been smoking cigars. He has been exposed to tobacco smoke. He has never used smokeless tobacco. He reports that he does not currently use alcohol. He reports that he does not use drugs.    Home  Medications:  meclizine, naproxen, omeprazole, and tamsulosin    Allergies:  No Known Allergies    Objective   Objective     Vitals:   Temp:  [96 °F (35.6 °C)-97.7 °F (36.5 °C)] 97.5 °F (36.4 °C)  Heart Rate:  [67-91] 67  Resp:  [12-18] 18  BP: (108-146)/(81-94) 132/82  Physical Exam    Constitutional: Awake, alert   Eyes: PERRLA, sclerae anicteric, no conjunctival injection   HENT: NCAT, mucous membranes moist   Neck: Supple, no thyromegaly, no lymphadenopathy, trachea midline   Respiratory: Clear to auscultation bilaterally, nonlabored respirations    Cardiovascular: RRR, no murmurs, rubs, or gallops, palpable pedal pulses bilaterally   Gastrointestinal: Positive bowel sounds, soft, nontender, nondistended   Musculoskeletal: No bilateral ankle edema, no clubbing or cyanosis to extremities   Psychiatric: Appropriate affect, cooperative   Neurologic: Oriented x 3, strength symmetric in all extremities, Cranial Nerves grossly intact to confrontation, speech clear   Skin: No rashes     Result Review    Result Review:  I have personally reviewed the results from the time of this admission to 4/7/2025 17:19 EDT and agree with these findings:  [x]  Laboratory list / accordion  []  Microbiology  [x]  Radiology  [x]  EKG/Telemetry   []  Cardiology/Vascular   []  Pathology  []  Old records  []  Other:        Assessment & Plan   Assessment / Plan     Brief Patient Summary:  Feliciano Noguera is a 74 y.o. male who is being admitted to observation secondary to chest pain    Active Hospital Problems:  Active Hospital Problems    Diagnosis     **Chest pain      Plan:   Epigastric pain  -Received GI cocktail in the ED   - Troponin flat and EKG nonischemic  - CTA chest negative acute  - Cardiac monitoring  - IV Pepcid twice daily  - Cardiology consult      VTE Prophylaxis:  Mechanical VTE prophylaxis orders are present.      CODE STATUS:    Code Status (Patient has no pulse and is not breathing): CPR (Attempt to  Resuscitate)  Medical Interventions (Patient has pulse or is breathing): Full Support  Level Of Support Discussed With: Patient    Admission Status:  I believe this patient meets observation status.    During patient visit, I utilized appropriate personal protective equipment including gloves. Appropriate PPE was worn during the entire visit.  Hand hygiene was completed before and after    55 minutes has been spent by [facility] providers in the care of this patient while under observation status    Electronically signed by DEBRA Castle, 04/07/25, 5:19 PM EDT.

## 2025-04-07 NOTE — ED NOTES
Nursing report ED to floor  Feliciano Noguera  74 y.o.  male    HPI :  HPI  Stated Reason for Visit: cp  History Obtained From: patient    Chief Complaint  Chief Complaint   Patient presents with    Chest Pain       Admitting doctor:   Abdelrahman Muller MD    Admitting diagnosis:   The primary encounter diagnosis was Chest pain, unspecified type. A diagnosis of Pulmonary nodule was also pertinent to this visit.    Code status:   Current Code Status       Date Active Code Status Order ID Comments User Context       4/7/2025 1558 CPR (Attempt to Resuscitate) [id number]  Clementina Gonzalez APRN ED        Question Answer    Code Status (Patient has no pulse and is not breathing) CPR (Attempt to Resuscitate)    Medical Interventions (Patient has pulse or is breathing) Full Support    Level Of Support Discussed With Patient                    Allergies:   Patient has no known allergies.    Isolation:   No active isolations    Intake and Output  No intake or output data in the 24 hours ending 04/07/25 1609    Weight:   There were no vitals filed for this visit.    Most recent vitals:   Vitals:    04/07/25 1240 04/07/25 1248 04/07/25 1401 04/07/25 1431   BP:  136/85 140/92 126/88   BP Location:  Left arm     Patient Position:  Sitting     Pulse: 88  78 74   Resp: 16      Temp: 96 °F (35.6 °C)      TempSrc: Tympanic      SpO2: 98%  99% 96%       Active LDAs/IV Access:   Lines, Drains & Airways       Active LDAs       Name Placement date Placement time Site Days    Peripheral IV 04/07/25 1408 Right Antecubital 04/07/25  1408  Antecubital  less than 1                    Labs (abnormal labs have a star):   Labs Reviewed   TROPONIN - Normal    Narrative:     High Sensitive Troponin T Reference Range:  <14.0 ng/L- Negative Female for AMI  <22.0 ng/L- Negative Male for AMI  >=14 - Abnormal Female indicating possible myocardial injury.  >=22 - Abnormal Male indicating possible myocardial injury.   Clinicians would have to utilize  clinical acumen, EKG, Troponin, and serial changes to determine if it is an Acute Myocardial Infarction or myocardial injury due to an underlying chronic condition.        CBC WITH AUTO DIFFERENTIAL - Normal   HIGH SENSITIVITIY TROPONIN T 1HR - Normal    Narrative:     High Sensitive Troponin T Reference Range:  <14.0 ng/L- Negative Female for AMI  <22.0 ng/L- Negative Male for AMI  >=14 - Abnormal Female indicating possible myocardial injury.  >=22 - Abnormal Male indicating possible myocardial injury.   Clinicians would have to utilize clinical acumen, EKG, Troponin, and serial changes to determine if it is an Acute Myocardial Infarction or myocardial injury due to an underlying chronic condition.        RAINBOW DRAW    Narrative:     The following orders were created for panel order [place] Draw.  Procedure                               Abnormality         Status                     ---------                               -----------         ------                     Green Top (Gel)[[id number]]                                  Final result               Lavender Top[[id number]]                                     Final result               Gold Top - SST[[id number]]                                   Final result               Light Blue Top[[id number]]                                   Final result                 Please view results for these tests on the individual orders.   COMPREHENSIVE METABOLIC PANEL    Narrative:     GFR Categories in Chronic Kidney Disease (CKD)      GFR Category          GFR (mL/min/1.73)    Interpretation  G1                     90 or greater         Normal or high (1)  G2                      60-89                Mild decrease (1)  G3a                   45-59                Mild to moderate decrease  G3b                   30-44                Moderate to severe decrease  G4                    15-29                Severe decrease  G5                    14 or less           Kidney  failure          (1)In the absence of evidence of kidney disease, neither GFR category G1 or G2 fulfill the criteria for CKD.    eGFR calculation 2021 CKD-EPI creatinine equation, which does not include race as a factor   CBC AND DIFFERENTIAL    Narrative:     The following orders were created for panel order CBC & Differential.  Procedure                               Abnormality         Status                     ---------                               -----------         ------                     CBC Auto Differential[[id number]]        Normal              Final result                 Please view results for these tests on the individual orders.   GREEN TOP   LAVENDER TOP   GOLD TOP - SST   LIGHT BLUE TOP       EKG:   ECG 12 Lead ED Triage Standing Order; Chest Pain   Preliminary Result   HEART RATE=67  bpm   RR Rcefcfuz=474  ms   WA Plcirooc=974  ms   P Horizontal Axis=13  deg   P Front Axis=43  deg   QRSD Ptlmdaqx=937  ms   QT Hupnngcc=383  ms   KNzJ=299  ms   QRS Axis=61  deg   T Wave Axis=36  deg   - ABNORMAL ECG -   Sinus rhythm   Atrial premature complexes   Right bundle branch block   Date and Time of Study:2025-04-07 12:44:27          Meds given in ED:   Medications   sodium chloride 0.9 % flush 10 mL (has no administration in time range)   sodium chloride 0.9 % flush 10 mL (has no administration in time range)   sodium chloride 0.9 % flush 10 mL (has no administration in time range)   sodium chloride 0.9 % infusion 40 mL (has no administration in time range)   aspirin tablet 325 mg (325 mg Oral Given 4/7/25 1400)   aluminum-magnesium hydroxide-simethicone (MAALOX MAX) 400-400-40 MG/5ML suspension 15 mL (15 mL Oral Given 4/7/25 1402)   pantoprazole (PROTONIX) injection 80 mg (80 mg Intravenous Given 4/7/25 1409)   iopamidol (ISOVUE-370) 76 % injection 100 mL (95 mL Intravenous Given by Other 4/7/25 1501)       Imaging results:  CT Angiogram Chest  Result Date: 4/7/2025   1. No thoracic aortic aneurysm or  dissection. 2. Small aortic valve calcification. 3. Solid pulmonary nodule in the lateral segment left lower lobe measuring 7 mm. Recommend 6-month follow-up chest CT.    This report was finalized on 4/7/2025 3:30 PM by Dr. Feliciano Pagan M.D on Workstation: [id number]        Ambulatory status:       Social issues:   Social History     Socioeconomic History    Marital status:    Tobacco Use    Smoking status: Some Days     Types: Cigars     Passive exposure: Past    Smokeless tobacco: Never    Tobacco comments:     Smokes 1 cigar every day.   Vaping Use    Vaping status: Never Used   Substance and Sexual Activity    Alcohol use: Not Currently     Comment: RARELY    Drug use: Never    Sexual activity: Not Currently       Peripheral Neurovascular  Peripheral Neurovascular (Adult)  Peripheral Neurovascular WDL: WDL    Neuro Cognitive  Neuro Cognitive (Adult)  Cognitive/Neuro/Behavioral WDL: WDL    Learning       Respiratory  Respiratory WDL  Respiratory WDL: WDL    Abdominal Pain       Pain Assessments  Pain (Adult)  (0-10) Pain Rating: Rest: 0  (0-10) Pain Rating: Activity: 0  Response to Pain Interventions: interventions effective per patient, nonverbal indicators absent/decreased    NIH Stroke Scale       Louise Barnett RN  04/07/25 16:09 EDT

## 2025-04-08 VITALS
RESPIRATION RATE: 19 BRPM | SYSTOLIC BLOOD PRESSURE: 117 MMHG | HEIGHT: 71 IN | OXYGEN SATURATION: 94 % | DIASTOLIC BLOOD PRESSURE: 82 MMHG | BODY MASS INDEX: 24.58 KG/M2 | TEMPERATURE: 97.4 F | HEART RATE: 72 BPM | WEIGHT: 175.6 LBS

## 2025-04-08 PROBLEM — R07.9 CHEST PAIN: Status: RESOLVED | Noted: 2025-04-07 | Resolved: 2025-04-08

## 2025-04-08 LAB
ANION GAP SERPL CALCULATED.3IONS-SCNC: 11.6 MMOL/L (ref 5–15)
BUN SERPL-MCNC: 15 MG/DL (ref 8–23)
BUN/CREAT SERPL: 15.5 (ref 7–25)
CALCIUM SPEC-SCNC: 8.9 MG/DL (ref 8.6–10.5)
CHLORIDE SERPL-SCNC: 104 MMOL/L (ref 98–107)
CO2 SERPL-SCNC: 22.4 MMOL/L (ref 22–29)
CREAT SERPL-MCNC: 0.97 MG/DL (ref 0.76–1.27)
DEPRECATED RDW RBC AUTO: 40.4 FL (ref 37–54)
EGFRCR SERPLBLD CKD-EPI 2021: 80.9 ML/MIN/1.73
ERYTHROCYTE [DISTWIDTH] IN BLOOD BY AUTOMATED COUNT: 13.3 % (ref 12.3–15.4)
GLUCOSE SERPL-MCNC: 96 MG/DL (ref 65–99)
HCT VFR BLD AUTO: 38.6 % (ref 37.5–51)
HGB BLD-MCNC: 13 G/DL (ref 13–17.7)
MCH RBC QN AUTO: 28.1 PG (ref 26.6–33)
MCHC RBC AUTO-ENTMCNC: 33.7 G/DL (ref 31.5–35.7)
MCV RBC AUTO: 83.4 FL (ref 79–97)
PLATELET # BLD AUTO: 236 10*3/MM3 (ref 140–450)
PMV BLD AUTO: 10.1 FL (ref 6–12)
POTASSIUM SERPL-SCNC: 4.5 MMOL/L (ref 3.5–5.2)
QT INTERVAL: 407 MS
QTC INTERVAL: 423 MS
RBC # BLD AUTO: 4.63 10*6/MM3 (ref 4.14–5.8)
SODIUM SERPL-SCNC: 138 MMOL/L (ref 136–145)
TROPONIN T SERPL HS-MCNC: 9 NG/L
WBC NRBC COR # BLD AUTO: 5.78 10*3/MM3 (ref 3.4–10.8)

## 2025-04-08 PROCEDURE — 96376 TX/PRO/DX INJ SAME DRUG ADON: CPT

## 2025-04-08 PROCEDURE — 25010000002 FAMOTIDINE 10 MG/ML SOLUTION: Performed by: NURSE PRACTITIONER

## 2025-04-08 PROCEDURE — 85027 COMPLETE CBC AUTOMATED: CPT | Performed by: STUDENT IN AN ORGANIZED HEALTH CARE EDUCATION/TRAINING PROGRAM

## 2025-04-08 PROCEDURE — 99203 OFFICE O/P NEW LOW 30 MIN: CPT | Performed by: STUDENT IN AN ORGANIZED HEALTH CARE EDUCATION/TRAINING PROGRAM

## 2025-04-08 PROCEDURE — 80048 BASIC METABOLIC PNL TOTAL CA: CPT | Performed by: STUDENT IN AN ORGANIZED HEALTH CARE EDUCATION/TRAINING PROGRAM

## 2025-04-08 PROCEDURE — 84484 ASSAY OF TROPONIN QUANT: CPT | Performed by: STUDENT IN AN ORGANIZED HEALTH CARE EDUCATION/TRAINING PROGRAM

## 2025-04-08 PROCEDURE — G0378 HOSPITAL OBSERVATION PER HR: HCPCS

## 2025-04-08 RX ORDER — PANTOPRAZOLE SODIUM 40 MG/1
40 TABLET, DELAYED RELEASE ORAL DAILY
Qty: 30 TABLET | Refills: 2 | Status: SHIPPED | OUTPATIENT
Start: 2025-04-08

## 2025-04-08 RX ADMIN — FAMOTIDINE 20 MG: 10 INJECTION, SOLUTION INTRAVENOUS at 08:07

## 2025-04-08 RX ADMIN — Medication 10 ML: at 08:07

## 2025-04-08 NOTE — DISCHARGE SUMMARY
ED OBSERVATION PROGRESS/DISCHARGE SUMMARY    Date of Admission: 4/7/2025   LOS: 0 days   PCP: Daniel Mahan MD          Hospital Outcome:   74-year-old male with a past medical history including but not limited to GERD, arthritis, BPH who was admitted the observation unit for further evaluation of chest/epigastric pain.  In the ER, high-sensitivity troponins noted flat and EKG showed sinus rhythm, chronic RBBB, no acute ischemia.  CTA of the chest showed no evidence of aortic pathology, that did note incidental pulmonary nodule in the left lower lobe recommending 6-month follow-up.  Patient had slight improvement with GI cocktail.    Dr. Joshua with cardiology evaluated patient this morning and concluded that patient's symptoms most likely due to GERD and recommend changing current medication.  Currently patient on omeprazole and we will switch him to pantoprazole 4 mg daily.  Patient will be discharged home and to follow-up with his PCP in 1 week.    ROS:  General: no fevers, chills  Respiratory: no cough, dyspnea  Cardiovascular: no chest pain, palpitations  Abdomen: No abdominal pain, nausea, vomiting, or diarrhea  Neurologic: No focal weakness    Objective   Physical Exam:  I have reviewed the vital signs.  Temp:  [96 °F (35.6 °C)-97.9 °F (36.6 °C)] 97.9 °F (36.6 °C)  Heart Rate:  [67-91] 68  Resp:  [12-18] 18  BP: (108-146)/(76-94) 137/86  General Appearance:    Alert, cooperative, no distress  Head:    Normocephalic, atraumatic  Eyes:    Sclerae anicteric  Neck:   Supple, no mass  Lungs: Clear to auscultation bilaterally, respirations unlabored  Heart: Regular rate and rhythm, S1 and S2 normal, no murmur, rub or gallop  Abdomen:  Soft, nontender, bowel sounds active, nondistended  Extremities: No clubbing, cyanosis, or edema to lower extremities  Pulses:  2+ and symmetric in distal lower extremities  Skin: No rashes   Neurologic: Oriented x3, Normal strength to extremities    Results Review:    I have  reviewed the labs, radiology results and diagnostic studies.    Results from last 7 days   Lab Units 04/08/25  0353   WBC 10*3/mm3 5.78   HEMOGLOBIN g/dL 13.0   HEMATOCRIT % 38.6   PLATELETS 10*3/mm3 236     Results from last 7 days   Lab Units 04/08/25  0353 04/07/25  1245   SODIUM mmol/L 138 139   POTASSIUM mmol/L 4.5 4.5   CHLORIDE mmol/L 104 104   CO2 mmol/L 22.4 24.6   BUN mg/dL 15 15   CREATININE mg/dL 0.97 1.04   CALCIUM mg/dL 8.9 9.7   BILIRUBIN mg/dL  --  0.5   ALK PHOS U/L  --  94   ALT (SGPT) U/L  --  14   AST (SGOT) U/L  --  17   GLUCOSE mg/dL 96 96     Imaging Results (Last 24 Hours)       Procedure Component Value Units Date/Time    CT Angiogram Chest [[id number]] Collected: 04/07/25 1519     Updated: 04/07/25 1533    Narrative:      CT ANGIOGRAM CHEST-     INDICATION: Rule out aortic pathology. Burning sensation.     COMPARISON: CTA chest August 3, 2017     TECHNIQUE:  CTA chest with IV contrast. Coronal and sagittal reformats. Three  dimensional reconstructions. Radiation dose reduction techniques were  utilized, including automated exposure control and exposure modulation  based on body size.     FINDINGS:      Chest wall: No lymphadenopathy. Fatty atrophy of the rotator cuff  musculature.     Mediastinum: Coronary artery atherosclerotic calcification. Small aortic  valve calcification. Heart is normal in size. No pericardial effusion.  No thoracic aortic aneurysm or dissection. Atherosclerotic plaque seen  in the central left subclavian artery, with 50% stenosis. No mediastinal  or hilar lymphadenopathy. Calcified right hilar lymph nodes, consistent  with prior granulomatous infection.     Lung/pleura: No effusions. Patent central airways. Small amount of  cylindrical bronchiolectasis. Respiratory motion artifact. Small amount  of posterior dependent and bibasilar subsegmental atelectasis or  scarring. Solid pulmonary nodule in the lateral basilar left lower lobe,  series 6, axial mage 120, measures  7 mm.     Upper abdomen: Cholecystectomy. Possible hepatic steatosis. Colonic  diverticulosis. Small hiatal hernia. Incidental duodenal diverticulum.     Osseous structures: Right humeral head anchors. Right glenohumeral  osteoarthritis. Total left shoulder arthroplasty.       Impression:         1. No thoracic aortic aneurysm or dissection.  2. Small aortic valve calcification.  3. Solid pulmonary nodule in the lateral segment left lower lobe  measuring 7 mm. Recommend 6-month follow-up chest CT.           This report was finalized on 4/7/2025 3:30 PM by Dr. Feliciano Pagan M.D  on Workstation: [id number]       XR Chest 1 View [[id number]] Collected: 04/07/25 1343     Updated: 04/07/25 1347    Narrative:      2 VIEW CHEST     HISTORY: Chest pain.     FINDINGS: The lungs are well expanded and clear and the heart and hilar  structures are normal. There is no acute disease.     This report was finalized on 4/7/2025 1:44 PM by Dr. Eusebio Dunne M.D  on Workstation: [id number]               I have reviewed the medications.     Discharge Medications        ASK your doctor about these medications        Instructions Start Date   meclizine 25 MG tablet  Commonly known as: ANTIVERT  Ask about: Which instructions should I use?   25 mg, Oral, 3 Times Daily PRN      naproxen 500 MG tablet  Commonly known as: NAPROSYN   500 mg, 2 Times Daily PRN      omeprazole 40 MG capsule  Commonly known as: priLOSEC  Ask about: Which instructions should I use?   40 mg, Daily      tamsulosin 0.4 MG capsule 24 hr capsule  Commonly known as: FLOMAX   1 capsule, Daily             ---------------------------------------------------------------------------------------------  Assessment & Plan   Assessment/Problem List    Chest pain      Plan:    Epigastric pain  -Received GI cocktail in the ED   - Troponin flat and EKG nonischemic  - CTA chest negative acute  - Cardiac monitoring  - IV Pepcid twice daily  - Cardiology consult    Disposition:  Home    Follow-up after Discharge: PCP    This note will serve as discharge      30 minutes have been spent by [facility] providers in the care of this patient while under observation status.    Appropriate PPE worn during patient encounter.  Hand hygeine performed before and after seeing the patient.      Brooke Villasenor PA-C 04/08/25 05:08 EDT

## 2025-04-08 NOTE — DISCHARGE INSTRUCTIONS
Return to the emergency department for symptoms recurrence or exacerbation  I have started you on Protonix and discontinued omeprazole

## 2025-04-08 NOTE — PLAN OF CARE
Problem: Adult Inpatient Plan of Care  Goal: Plan of Care Review  Outcome: Progressing  Flowsheets (Taken 4/8/2025 0502)  Outcome Evaluation: Patient AOx4, up a guillaume. NPO since midnight. Denies any chest pain during shift. Plan for cardio to see  Plan of Care Reviewed With: patient  Goal: Patient-Specific Goal (Individualized)  Outcome: Progressing  Goal: Absence of Hospital-Acquired Illness or Injury  Outcome: Progressing  Intervention: Identify and Manage Fall Risk  Recent Flowsheet Documentation  Taken 4/8/2025 0400 by Jada Molina RN  Safety Promotion/Fall Prevention: safety round/check completed  Taken 4/8/2025 0200 by Jada Molina RN  Safety Promotion/Fall Prevention: safety round/check completed  Taken 4/8/2025 0000 by Jada Molina RN  Safety Promotion/Fall Prevention: safety round/check completed  Taken 4/7/2025 2200 by Jada Molina RN  Safety Promotion/Fall Prevention: safety round/check completed  Taken 4/7/2025 2050 by Jada Molina RN  Safety Promotion/Fall Prevention: safety round/check completed  Goal: Optimal Comfort and Wellbeing  Outcome: Progressing  Goal: Readiness for Transition of Care  Outcome: Progressing     Problem: Chest Pain  Goal: Resolution of Chest Pain Symptoms  Outcome: Progressing   Goal Outcome Evaluation:  Plan of Care Reviewed With: patient           Outcome Evaluation: Patient AOx4, up a guillaume. NPO since midnight. Denies any chest pain during shift. Plan for cardio to see

## 2025-04-08 NOTE — PROGRESS NOTES
"DRAGAN LAMAR Attestation Note    I supervised care provided by the midlevel provider.    The ATA and I have discussed this patient's history, physical exam, and treatment plan. I have reviewed the documentation and personally had a face to face interaction with the patient  I affirm the documentation and agree with the treatment and plan. I provided a substantive portion of the care of this patient.  I personally performed the physical exam, in its entirety.  My personal findings are documented in below:    History:  Patient with history of GERD, arthritis and BPH is admitted to observation unit for further workup and management of epigastric pain radiating to the chest.  He says the pain began with a \"heartburn sensation\" that radiated all the way up to his throat.  However the pain also has radiated towards the left sided chest and left shoulder and arm today as well.  He denies any vomiting.  Denies diaphoresis.  At this time, he feels comfortable without any pain in the chest or arm.  Patient does tell me that he has had some progressively worsening sensation of tiredness/fatigue over the past week.  He still enjoys staying active and works for himself, doing multiple projects and plumbing, electrical work and patience.    Physical Exam:  General: No acute distress.  HENT: NCAT  Eyes: no scleral icterus.  EOMI  Neck: Painless range of motion  CV: Pink warm and well-perfused throughout, regular rate  Respiratory: No distress or increased work of breathing, no stridor  Abdomen: soft, no focal tenderness or rigidity  Musculoskeletal: no deformity.  Neuro: Alert, speech fluent and easily intelligible  Skin: warm, dry.    Assessment and Plan:  Epigastric pain/chest pain: Troponins are 12, 11.  CTA chest is negative for PE.  IV Pepcid ordered.  Continuous cardiac monitoring.  Cardiology consulted.    "

## 2025-04-08 NOTE — PLAN OF CARE
Goal Outcome Evaluation:         Pt dc via private vehicle with belongings. Medication received from pharmacy. IV removed. Education provided. Questions encouraged and answered.

## 2025-04-08 NOTE — CONSULTS
Compliance scanned for review. Son LVM asking to follow up on getting pressure lowered. Date of Hospital Visit: 25  Encounter Provider: Luciano Webb MD  Place of Service: [facility] CARDIOLOGY  Patient Name: Feliciano Noguera  :1950  Referral Provider: Abdelrahman Muller MD    Chief complaint: Chest pain    History of Present Illness:  Feliciano Noguera is a 74-year-old male with GERD who presented to urgent care with chest pain and dyspepsia.  He describes his discomfort as a burning sensation that rises up his chest.  He notes that it was occurring for about 3 to 4 days, at times severe.  He states that drinking water would make it worse.  He felt as if at times the discomfort radiated into his left arm.  Urgent care was concerned for possible MI and he was transferred to the ED.    On arrival to the ED, vitals were stable and within normal limits.  High sensitivity troponins negative x 3, normal CBC and BMP.  EKG with normal sinus rhythm, APCs, right bundle branch block.  (At baseline) CT chest negative for dissection.  He received aspirin, Pepcid, Protonix, and Maalox and his discomfort has resolved.    Of note, patient was last seen in consult by Dr. Blank Vazquez in  for chest discomfort. An echo showed an estimated EF of 51%, and mildly dilated right ventricular cavity with trace tricuspid valve regurgitation.      Echocardiogram 2017  Left ventricular systolic function is normal. Estimated EF = 51%.  Right ventricular cavity is mildly dilated.  Right ventricular cavity is mildly dilated. Borderline low right ventricular systolic function noted.  Trace tricuspid valve regurgitation is present. Estimated right ventricular systolic pressure from tricuspid regurgitation is normal (<35 mmHg).  Saline test results are negative.    Past Medical History:   Diagnosis Date    Arthritis     Enlarged prostate     GERD (gastroesophageal reflux disease)     [place] (hard of hearing)     DEAF IN RIGHT EAR    Knee pain, left     KNEE REPLACED 2023    Left  shoulder pain     Vertigo        Past Surgical History:   Procedure Laterality Date    CHOLECYSTECTOMY WITH INTRAOPERATIVE CHOLANGIOGRAM N/A 8/1/2017    Procedure: CHOLECYSTECTOMY LAPAROSCOPIC INTRAOPERATIVE CHOLANGIOGRAM;  Surgeon: Bola Patel MD;  Location: [facility] OR [facility];  Service:     COLONOSCOPY N/A 09/24/2009    Dr. Jamison Johnson    COLONOSCOPY N/A 11/14/2018    Procedure: COLONOSCOPY into cecum with biopsy;  Surgeon: Bola Patel MD;  [facility];  Service: General    ENDOSCOPY N/A 12/15/2008    Dr. Jamison Johnson    ENDOSCOPY N/A 11/14/2018    Procedure: ESOPHAGOGASTRODUODENOSCOPY with biopsy;  Surgeon: Bola Patel MD;  Location: [facility] ENDOSCOPY;  Service: General    KNEE ARTHROSCOPY Left     PROSTATE BIOPSY      ROTATOR CUFF REPAIR Right 09/29/2009    Dr. Arik Harris    SHOULDER CLOSED REDUCTION Left 2/7/2024    Procedure: CLOSED REDUCTION INTERNAL FIXATION LEFT REVERSE SHOULDER ARTHROPLASTY;  Surgeon: Anoop Soliman MD;  Location: [facility] OR [facility];  Service: Orthopedics;  Laterality: Left;    TOTAL KNEE ARTHROPLASTY Right 5/28/2019    Procedure: RIGHT TOTAL KNEE ARTHROPLASTY;  Surgeon: Bola Ramires MD;  Location: [facility] MAIN OR;  Service: Orthopedics    TOTAL KNEE ARTHROPLASTY Left 11/14/2023    Procedure: LEFT TOTAL KNEE ARTHROPLASTY;  Surgeon: Bola Ramires MD;  Location: [facility];  Service: Orthopedics;  Laterality: Left;    TOTAL SHOULDER ARTHROPLASTY W/ DISTAL CLAVICLE EXCISION Left 1/17/2024    Procedure: TOTAL SHOULDER REVERSE ARTHROPLASTY;  Surgeon: Anoop Soliman MD;  Location: [facility] OR [facility];  Service: Orthopedics;  Laterality: Left;       Medications Prior to Admission   Medication Sig Dispense Refill Last Dose/Taking    naproxen (NAPROSYN) 500 MG tablet Take 1 tablet by mouth 2 (Two) Times a Day As Needed for Mild Pain.   4/6/2025    omeprazole (priLOSEC) 40 MG capsule Take 1 capsule by mouth Daily.   4/7/2025    tamsulosin (FLOMAX) 0.4  "MG capsule 24 hr capsule Take 1 capsule by mouth Daily.   4/6/2025    meclizine (ANTIVERT) 25 MG tablet Take 1 tablet by mouth 3 (Three) Times a Day As Needed for Dizziness or Nausea. 28 tablet 0 More than a month       Current Meds  Scheduled Meds:famotidine, 20 mg, Intravenous, Q12H  sodium chloride, 10 mL, Intravenous, Q12H  tamsulosin, 0.4 mg, Oral, Nightly      Continuous Infusions:   PRN Meds:.  acetaminophen    sodium chloride    sodium chloride    sodium chloride    Allergies as of 04/07/2025    (No Known Allergies)       Social History     Socioeconomic History    Marital status:    Tobacco Use    Smoking status: Some Days     Types: Cigars     Passive exposure: Past    Smokeless tobacco: Never    Tobacco comments:     Smokes 1 cigar every day.   Vaping Use    Vaping status: Never Used   Substance and Sexual Activity    Alcohol use: Not Currently     Comment: RARELY    Drug use: Never    Sexual activity: Not Currently     Partners: Female       Family History   Problem Relation Age of Onset    Diabetes Mother     Tuberculosis Father     Breast cancer Sister     Bone cancer Sister     Lung cancer Brother     Diabetes Brother     Heart disease Brother     Malig Hyperthermia Neg Hx        REVIEW OF SYSTEMS:   12 point ROS was performed and is negative except as outlined in HPI          Objective:   Temp:  [96 °F (35.6 °C)-97.9 °F (36.6 °C)] 97.4 °F (36.3 °C)  Heart Rate:  [67-91] 72  Resp:  [12-19] 19  BP: (108-146)/(76-94) 117/82  Body mass index is 24.49 kg/m².  Flowsheet Rows      Flowsheet Row First Filed Value   Admission Height 180.3 cm (71\") Documented at 04/07/2025 1629   Admission Weight 79.7 kg (175 lb 9.6 oz) Documented at 04/07/2025 1629          Vitals:    04/08/25 0740   BP: 117/82   Pulse: 72   Resp: 19   Temp: 97.4 °F (36.3 °C)   SpO2: 94%       GEN: no distress, alert and oriented  HEENT: NACT, EOMI, moist mucous membranes  Lungs: CTAB, no wheezes, rales or rhonchi  CV: normal rate, " regular rhythm, normal S1, S2, no murmurs, +2 radial pulses b/l, no carotid bruit  Abdomen: soft, nontender, nondistended, NABS  Extremities: no edema  Skin: no rash, warm, dry  Heme/Lymph: no bruising  Psych: organized thought, normal behavior and affect      Lab Review:   Results from last 7 days   Lab Units 04/08/25  0353 04/07/25  1245   SODIUM mmol/L 138 139   POTASSIUM mmol/L 4.5 4.5   CHLORIDE mmol/L 104 104   CO2 mmol/L 22.4 24.6   BUN mg/dL 15 15   CREATININE mg/dL 0.97 1.04   CALCIUM mg/dL 8.9 9.7   BILIRUBIN mg/dL  --  0.5   ALK PHOS U/L  --  94   ALT (SGPT) U/L  --  14   AST (SGOT) U/L  --  17   GLUCOSE mg/dL 96 96     Results from last 7 days   Lab Units 04/08/25  0353 04/07/25  1409 04/07/25  1245   HSTROP T ng/L 9 11 12     Results from last 7 days   Lab Units 04/08/25  0353 04/07/25  1245   WBC 10*3/mm3 5.78 6.84   HEMOGLOBIN g/dL 13.0 13.8   HEMATOCRIT % 38.6 42.1   PLATELETS 10*3/mm3 236 262                       I personally viewed and interpreted the patient's EKG/Telemetry data)      Chest pain    Assessment and Plan:  #Burning chest pain  #GERD    74-year-old male with GERD who presented to urgent care with chest pain and dyspepsia, found to have normal troponins, EKG at baseline.  CT chest negative for dissection.  His discomfort resolved with GI cocktail.  His discomfort is likely secondary to severe GERD, can consider changing his GERD medications and reassessing as outpatient.  No further cardiac workup indicated at this time, he can be discharged from cardiac standpoint.    Luciano Joshua MD, FACC, [facility]  04/08/25  08:49 EDT.

## 2025-04-08 NOTE — ANESTHESIA PROCEDURE NOTES
Airway  Urgency: elective    Date/Time: 11/14/2023 3:43 PM  End Time:11/14/2023 3:44 PM  Airway not difficult    General Information and Staff    Patient location during procedure: OR  Anesthesiologist: Reno Aguilar DO    Indications and Patient Condition  Indications for airway management: airway protection    Preoxygenated: yes  Mask difficulty assessment: 2 - vent by mask + OA or adjuvant +/- NMBA    Final Airway Details  Final airway type: endotracheal airway      Successful airway: ETT  Cuffed: yes   Successful intubation technique: direct laryngoscopy  Endotracheal tube insertion site: oral  Blade: Sanket  Blade size: 4  ETT size (mm): 7.5  Cormack-Lehane Classification: grade IIa - partial view of glottis  Placement verified by: capnometry   Cuff volume (mL): 8  Measured from: lips  ETT/EBT  to lips (cm): 21  Number of attempts at approach: 1  Assessment: lips, teeth, and gum same as pre-op and atraumatic intubation             None

## 2025-04-09 NOTE — CASE MANAGEMENT/SOCIAL WORK
Case Management Discharge Note      Final Note: Home; self-care. Heena BALL         Selected Continued Care - Discharged on 4/8/2025 Admission date: 4/7/2025 - Discharge disposition: Home or Self Care      Destination    No services have been selected for the patient.                Durable Medical Equipment    No services have been selected for the patient.                Dialysis/Infusion    No services have been selected for the patient.                Home Medical Care    No services have been selected for the patient.                Therapy    No services have been selected for the patient.                Community Resources    No services have been selected for the patient.                Community & DME    No services have been selected for the patient.                         Final Discharge Disposition Code: 01 - home or self-care

## (undated) DEVICE — DRSNG GZ PETROLTM XEROFORM CURAD 1X8IN STRL

## (undated) DEVICE — PK LAP CHOLE BG

## (undated) DEVICE — APPL CHLORAPREP W/TINT 26ML ORNG

## (undated) DEVICE — DRAPE,REIN 53X77,STERILE: Brand: MEDLINE

## (undated) DEVICE — DUAL CUT SAGITTAL BLADE

## (undated) DEVICE — BNDG ADHS CURAD FLX/FABRC 2X4IN STRL LF

## (undated) DEVICE — BIT DRL SCRW PERIPH 2.7MM

## (undated) DEVICE — BNDG ELAS CO-FLEX SLF ADHR 6IN 5YD LF STRL

## (undated) DEVICE — DECANTER BAG 9": Brand: MEDLINE INDUSTRIES, INC.

## (undated) DEVICE — GLV SURG SIGNATURE ESSENTIAL PF LTX SZ8

## (undated) DEVICE — NDL SPINE 20G 3 1/2 YEL STRL 1P/U

## (undated) DEVICE — KT DRN EVAC WND PVC PCH WTROC RND 10F400

## (undated) DEVICE — BNDG ELAS ELITE V/CLOSE 4IN 5YD LF STRL

## (undated) DEVICE — THE TORRENT IRRIGATION SCOPE CONNECTOR IS USED WITH THE TORRENT IRRIGATION TUBING TO PROVIDE IRRIGATION FLUIDS SUCH AS STERILE WATER DURING GASTROINTESTINAL ENDOSCOPIC PROCEDURES WHEN USED IN CONJUNCTION WITH AN IRRIGATION PUMP (OR ELECTROSURGICAL UNIT).: Brand: TORRENT

## (undated) DEVICE — ANTIBACTERIAL UNDYED BRAIDED (POLYGLACTIN 910), SYNTHETIC ABSORBABLE SUTURE: Brand: COATED VICRYL

## (undated) DEVICE — SUT VIC 0 CT1 36IN J946H

## (undated) DEVICE — PATIENT RETURN ELECTRODE, SINGLE-USE, CONTACT QUALITY MONITORING, ADULT, WITH 9FT CORD, FOR PATIENTS WEIGING OVER 33LBS. (15KG): Brand: MEGADYNE

## (undated) DEVICE — NEEDLE, QUINCKE, 20GX3.5": Brand: MEDLINE

## (undated) DEVICE — PK SHLDR OPN 40

## (undated) DEVICE — BITEBLOCK OMNI BLOC

## (undated) DEVICE — PAD,ABDOMINAL,8"X10",ST,LF: Brand: MEDLINE

## (undated) DEVICE — ENDOPATH XCEL BLADELESS TROCARS WITH STABILITY SLEEVES: Brand: ENDOPATH XCEL

## (undated) DEVICE — STPLR SKIN VISISTAT WD 35CT

## (undated) DEVICE — Device: Brand: DEFENDO AIR/WATER/SUCTION AND BIOPSY VALVE

## (undated) DEVICE — GLV SURG BIOGEL LTX PF 6 1/2

## (undated) DEVICE — ENDOPATH XCEL BLUNT TIP TROCARS WITH SMOOTH SLEEVES: Brand: ENDOPATH XCEL

## (undated) DEVICE — TUBING, SUCTION, 1/4" X 10', STRAIGHT: Brand: MEDLINE

## (undated) DEVICE — SUT VIC 0 CT2 CR8 18IN DYED J727D

## (undated) DEVICE — SHEET, DRAPE, SPLIT, STERILE: Brand: MEDLINE

## (undated) DEVICE — DECANT BG O JET

## (undated) DEVICE — DRP C/ARM 41X74IN

## (undated) DEVICE — GLV SURG PREMIERPRO ORTHO LTX PF SZ7.5 BRN

## (undated) DEVICE — GLV SURG BIOGEL LTX PF 8

## (undated) DEVICE — PENCL ES MEGADINE EZ/CLEAN BUTN W/HOLSTR 10FT

## (undated) DEVICE — DRSNG GZ CURAD XEROFORM NONADHS 5X9IN STRL

## (undated) DEVICE — MAT FLR ABSORBENT LG 4FT 10 2.5FT

## (undated) DEVICE — UNDERCAST PADDING: Brand: DEROYAL

## (undated) DEVICE — ENDOPATH XCEL UNIVERSAL TROCAR STABLILITY SLEEVES: Brand: ENDOPATH XCEL

## (undated) DEVICE — SINGLE-USE BIOPSY FORCEPS: Brand: RADIAL JAW 4

## (undated) DEVICE — ENDOPOUCH RETRIEVER SPECIMEN RETRIEVAL BAGS: Brand: ENDOPOUCH RETRIEVER

## (undated) DEVICE — SYR LUERLOK 20CC

## (undated) DEVICE — SKIN PREP TRAY W/CHG: Brand: MEDLINE INDUSTRIES, INC.

## (undated) DEVICE — SUT MNCRYL 4/0 PS2 18 IN

## (undated) DEVICE — SPNG GZ WOVN 4X4IN 12PLY 10/BX STRL

## (undated) DEVICE — DRESSING,GAUZE,XEROFORM,CURAD,1"X8",ST: Brand: CURAD

## (undated) DEVICE — GLV SURG BIOGEL LTX PF 7 1/2

## (undated) DEVICE — BNDG ADHS PLSTC 1X3IN LF

## (undated) DEVICE — ENCORE® LATEX ORTHO SIZE 7.5, STERILE LATEX POWDER-FREE SURGICAL GLOVE: Brand: ENCORE

## (undated) DEVICE — SYRINGE, LUER LOCK, 60ML: Brand: MEDLINE

## (undated) DEVICE — PK KN TOTL 40

## (undated) DEVICE — BNDG ELAS ELITE V/CLOSE 6IN 5YD LF STRL

## (undated) DEVICE — APPL CHLORAPREP HI/LITE 26ML ORNG

## (undated) DEVICE — IMPLANTABLE DEVICE
Type: IMPLANTABLE DEVICE | Site: SHOULDER | Status: NON-FUNCTIONAL
Brand: COMPREHENSIVE® REVERSE SHOULDER
Removed: 2024-01-17

## (undated) DEVICE — CONTAINER,SPECIMEN,OR STERILE,4OZ: Brand: MEDLINE

## (undated) DEVICE — HANDPIECE SET WITH COAXIAL HIGH FLOW TIP AND SUCTION TUBE: Brand: INTERPULSE

## (undated) DEVICE — 3M™ STERI-STRIP™ COMPOUND BENZOIN TINCTURE 40 BAGS/CARTON 4 CARTONS/CASE C1544: Brand: 3M™ STERI-STRIP™

## (undated) DEVICE — CANN NASL CO2 TRULINK W/O2 A/

## (undated) DEVICE — GLV SURG SENSICARE GREEN W/ALOE PF LF 6.5 STRL